# Patient Record
Sex: FEMALE | Race: WHITE | Employment: OTHER | URBAN - METROPOLITAN AREA
[De-identification: names, ages, dates, MRNs, and addresses within clinical notes are randomized per-mention and may not be internally consistent; named-entity substitution may affect disease eponyms.]

---

## 2017-01-16 ENCOUNTER — APPOINTMENT (OUTPATIENT)
Dept: INTERNAL MEDICINE CLINIC | Age: 82
End: 2017-01-16

## 2017-01-16 ENCOUNTER — HOSPITAL ENCOUNTER (OUTPATIENT)
Dept: LAB | Age: 82
Discharge: HOME | End: 2017-01-16
Payer: MEDICARE

## 2017-01-16 DIAGNOSIS — E11.9 CONTROLLED TYPE 2 DIABETES MELLITUS WITHOUT COMPLICATION, UNSPECIFIED LONG TERM INSULIN USE STATUS: Primary | ICD-10-CM

## 2017-01-16 PROCEDURE — 85025 COMPLETE CBC W/AUTO DIFF WBC: CPT

## 2017-01-16 PROCEDURE — 80053 COMPREHEN METABOLIC PANEL: CPT

## 2017-01-16 PROCEDURE — 36415 COLL VENOUS BLD VENIPUNCTURE: CPT

## 2017-01-16 PROCEDURE — 80061 LIPID PANEL: CPT

## 2017-01-16 PROCEDURE — 83036 HEMOGLOBIN GLYCOSYLATED A1C: CPT

## 2017-01-17 ENCOUNTER — OFFICE VISIT (OUTPATIENT)
Dept: INTERNAL MEDICINE CLINIC | Age: 82
End: 2017-01-17

## 2017-01-17 VITALS
WEIGHT: 187 LBS | OXYGEN SATURATION: 97 % | BODY MASS INDEX: 31.16 KG/M2 | SYSTOLIC BLOOD PRESSURE: 131 MMHG | DIASTOLIC BLOOD PRESSURE: 75 MMHG | HEART RATE: 84 BPM | RESPIRATION RATE: 18 BRPM | HEIGHT: 65 IN | TEMPERATURE: 98.1 F

## 2017-01-17 DIAGNOSIS — R73.09 ELEVATED HEMOGLOBIN A1C: ICD-10-CM

## 2017-01-17 DIAGNOSIS — Z23 ENCOUNTER FOR IMMUNIZATION: ICD-10-CM

## 2017-01-17 DIAGNOSIS — I10 ESSENTIAL HYPERTENSION: Primary | ICD-10-CM

## 2017-01-17 DIAGNOSIS — K21.9 GASTROESOPHAGEAL REFLUX DISEASE WITHOUT ESOPHAGITIS: ICD-10-CM

## 2017-01-17 LAB
ALBUMIN SERPL-MCNC: 4.5 G/DL (ref 3.5–4.7)
ALBUMIN/GLOB SERPL: 2.1 {RATIO} (ref 1.1–2.5)
ALP SERPL-CCNC: 89 IU/L (ref 39–117)
ALT SERPL-CCNC: 21 IU/L (ref 0–32)
AST SERPL-CCNC: 21 IU/L (ref 0–40)
BASOPHILS # BLD AUTO: 0 X10E3/UL (ref 0–0.2)
BASOPHILS NFR BLD AUTO: 1 %
BILIRUB SERPL-MCNC: 0.5 MG/DL (ref 0–1.2)
BUN SERPL-MCNC: 24 MG/DL (ref 8–27)
BUN/CREAT SERPL: 29 (ref 11–26)
CALCIUM SERPL-MCNC: 10 MG/DL (ref 8.7–10.3)
CHLORIDE SERPL-SCNC: 101 MMOL/L (ref 96–106)
CHOLEST SERPL-MCNC: 160 MG/DL (ref 100–199)
CO2 SERPL-SCNC: 25 MMOL/L (ref 18–29)
CREAT SERPL-MCNC: 0.83 MG/DL (ref 0.57–1)
EOSINOPHIL # BLD AUTO: 0.1 X10E3/UL (ref 0–0.4)
EOSINOPHIL NFR BLD AUTO: 2 %
ERYTHROCYTE [DISTWIDTH] IN BLOOD BY AUTOMATED COUNT: 13.7 % (ref 12.3–15.4)
EST. AVERAGE GLUCOSE BLD GHB EST-MCNC: 137 MG/DL
GLOBULIN SER CALC-MCNC: 2.1 G/DL (ref 1.5–4.5)
GLUCOSE SERPL-MCNC: 120 MG/DL (ref 65–99)
HBA1C MFR BLD: 6.4 % (ref 4.8–5.6)
HCT VFR BLD AUTO: 44.1 % (ref 34–46.6)
HDLC SERPL-MCNC: 42 MG/DL
HGB BLD-MCNC: 14.6 G/DL (ref 11.1–15.9)
IMM GRANULOCYTES # BLD: 0 X10E3/UL (ref 0–0.1)
IMM GRANULOCYTES NFR BLD: 0 %
LDLC SERPL CALC-MCNC: 88 MG/DL (ref 0–99)
LYMPHOCYTES # BLD AUTO: 2.7 X10E3/UL (ref 0.7–3.1)
LYMPHOCYTES NFR BLD AUTO: 45 %
MCH RBC QN AUTO: 30.2 PG (ref 26.6–33)
MCHC RBC AUTO-ENTMCNC: 33.1 G/DL (ref 31.5–35.7)
MCV RBC AUTO: 91 FL (ref 79–97)
MONOCYTES # BLD AUTO: 0.5 X10E3/UL (ref 0.1–0.9)
MONOCYTES NFR BLD AUTO: 8 %
NEUTROPHILS # BLD AUTO: 2.6 X10E3/UL (ref 1.4–7)
NEUTROPHILS NFR BLD AUTO: 44 %
PLATELET # BLD AUTO: 203 X10E3/UL (ref 150–379)
POTASSIUM SERPL-SCNC: 5.2 MMOL/L (ref 3.5–5.2)
PROT SERPL-MCNC: 6.6 G/DL (ref 6–8.5)
RBC # BLD AUTO: 4.83 X10E6/UL (ref 3.77–5.28)
SODIUM SERPL-SCNC: 140 MMOL/L (ref 134–144)
TRIGL SERPL-MCNC: 148 MG/DL (ref 0–149)
VLDLC SERPL CALC-MCNC: 30 MG/DL (ref 5–40)
WBC # BLD AUTO: 5.9 X10E3/UL (ref 3.4–10.8)

## 2017-01-17 RX ORDER — DEXAMETHASONE 0.5 MG/5ML
SOLUTION ORAL
COMMUNITY
End: 2017-06-06 | Stop reason: CLARIF

## 2017-01-17 NOTE — MR AVS SNAPSHOT
Visit Information Date & Time Provider Department Dept. Phone Encounter #  
 1/17/2017  8:15 AM Ru Michaels, [address] [place] [address],[address] [phone] [id number] Follow-up Instructions Return in about 6 months (around 7/17/2017) for follow up htn , prediabetes and hld. Upcoming Health Maintenance Date Due DTaP/Tdap/Td series (1 - Tdap) 11/8/1957 OSTEOPOROSIS SCREENING (DEXA) 11/8/2001 Pneumococcal 65+ Low/Medium Risk (2 of 2 - PCV13) 11/10/2015 INFLUENZA AGE 9 TO ADULT 8/1/2016 MEDICARE YEARLY EXAM 12/16/2016 GLAUCOMA SCREENING Q2Y 10/6/2017 Allergies as of 1/17/2017  Review Complete On: 1/17/2017 By: Ru Michaels MD  
  
 Severity Noted Reaction Type Reactions Fosamax [Alendronate]  04/01/2011    Nausea Only Current Immunizations  Never Reviewed Name Date Influenza Vaccine 11/10/2014, 11/1/2013 Pneumococcal Polysaccharide (PPSV-23) 11/10/2014 Zoster 1/1/2012 Not reviewed this visit You Were Diagnosed With   
  
 Codes Comments Essential hypertension    -  Primary ICD-10-CM: I10 
ICD-9-CM: 401.9 Gastroesophageal reflux disease without esophagitis     ICD-10-CM: K21.9 ICD-9-CM: 530.81 Elevated hemoglobin A1c     ICD-10-CM: R73.09 
ICD-9-CM: 790.29 Vitals BP Pulse Temp Resp Height(growth percentile) Weight(growth percentile) 131/75 (BP 1 Location: Right arm, BP Patient Position: Sitting) 84 98.1 °F (36.7 °C) (Oral) 18 5' 5\" (1.651 m) 187 lb (84.8 kg) SpO2 BMI OB Status Smoking Status 97% 31.12 kg/m2 Postmenopausal Former Smoker Vitals History BMI and BSA Data Body Mass Index Body Surface Area  
 31.12 kg/m 2 1.97 m 2 Preferred Pharmacy Pharmacy Name Phone [facility] [phone] Your Updated Medication List  
  
   
This list is accurate as of: 1/17/17  9:05 AM.  Always use your most recent med list.  
  
 acetaminophen 650 mg CR tablet Commonly known as:  TYLENOL Take 650 mg by mouth every six (6) hours as needed for Pain. ADVIL 200 mg tablet Generic drug:  ibuprofen Take 200 mg by mouth. ASPIR-LOW 81 mg tablet Generic drug:  aspirin delayed-release Take 81 mg by mouth daily. Indications: CEREBRAL THROMBOEMBOLISM PREVENTION, PREVENTION OF TRANSIENT ISCHEMIC ATTACKS  
  
 dexamethasone 0.5 mg/5 mL solution Commonly known as:  DECADRON Take  by mouth. Rinse with 1 teaspoon four times a day  
  
 losartan 50 mg tablet Commonly known as:  COZAAR Take 1 Tab by mouth daily. NexIUM 40 mg capsule Generic drug:  esomeprazole TAKE 1 CAPSULE DAILY  
  
 simvastatin 20 mg tablet Commonly known as:  ZOCOR  
TAKE 1 TABLET NIGHTLY  
  
 sodium chloride 2 % ophthalmic solution Commonly known as:  EMIR 128 Administer 1 Drop to both eyes as needed. timolol maleate 0.5 % Drpd ophthalmic solution Administer 1 Drop to both eyes daily. traMADol-acetaminophen 37.5-325 mg per tablet Commonly known as:  ULTRACET Take 1 Tab by mouth every eight (8) hours as needed for Pain. Max Daily Amount: 3 Tabs. Indications: PAIN We Performed the Following HEMOGLOBIN A1C WITH EAG [72854 CPT(R)] Follow-up Instructions Return in about 6 months (around 7/17/2017) for follow up htn , prediabetes and hld. Introducing [facility] & [facility]! Dear Luisana Gill: Thank you for requesting a [employer] account. Our records indicate that you already have an active [employer] account. You can access your account anytime at [email or web address] Did you know that you can access your hospital and ER discharge instructions at any time in [employer]? You can also review all of your test results from your hospital stay or ER visit. Additional Information If you have questions, please visit the Frequently Asked Questions section of the [employer] website at [email or web address] Remember, [employer] is NOT to be used for urgent needs. For medical emergencies, dial 911. Now available from your iPhone and Android! Please provide this summary of care documentation to your next provider. Your primary care clinician is listed as aVl Levin. If you have any questions after today's visit, please call [phone].

## 2017-01-17 NOTE — PROGRESS NOTES
SUBJECTIVE:   Ms. Herber Castro is a [de-identified] y.o. female who is here for follow up of htn and prediabetes. Pt reports the pain with shingles was relentless, but the pain medication did offer some relief. Pt's BP is well controlled at 131/75 today. Pt's labs from yesterday were reviewed noting Hgb A1C of 6.4, glucose of 120, normal triglycerides, normal cholesterol, and HDL of 42. . Pt's Hgb A1C was 6.5 on 7/27/16. Pt reports her Hgb A1C was 6.4 years ago as well. Pt states she has always eaten vegetables, but she has started to cut back on simple carbohydrates. Pt reports eating salmon three times a week and steak once a week. Pt endorses exercising for 5 minutes daily and walking up 3 flights of stairs. Pt states her dentist told her she had inflammation in two areas of her gum. Pt reports being prescribed Dexamethasone 0.5/5ml rinse QID. Pt claims she has been using this for 6 days so far. Pt denies knowing when she should discontinue the medication. Pt recalls her GI doctor telling her to mix up Nexium with OTC alternatives. Pt claims she can control her GERD with lifestyle and diet changes. Pt denies taking a multivitamin. Pt endorses having a Dexa bone density lab previously and was dx with the beginning of osteopenia. Pt claims Fosamax caused stomach distress. Pt notes she is not interested in having another study at this time. Pt endorses taking Calcium supplements BID. ROUTINE HEALTH MAINTENANCE:  PREVENTIVE:  Flu: given today    At this time, she is otherwise doing well and has brought no other complaints to my attention today. For a list of the medical issues addressed today, see the assessment and plan below. PMH:   Past Medical History   Diagnosis Date    Adverse effect of anesthesia 2005     took a long time to go to sleep with last colonoscopy    CAD (coronary artery disease) 2010     MI?     Gastrointestinal disorder      GERD    Glaucoma     Hypertension     Ill-defined condition      osteopenia    Ill-defined condition      pulled shoulder lt. and elbow replacement     PSH:  has a past surgical history that includes colonoscopy; orthopaedic; colonoscopy,diagnostic (9/9/2016); and colonoscopy (N/A, 9/9/2016). All: is allergic to fosamax [alendronate]. MEDS:   Current Outpatient Prescriptions   Medication Sig    dexamethasone (DECADRON) 0.5 mg/5 mL solution Take  by mouth. Rinse with 1 teaspoon four times a day    NEXIUM 40 mg capsule TAKE 1 CAPSULE DAILY    ibuprofen (ADVIL) 200 mg tablet Take 200 mg by mouth.  losartan (COZAAR) 50 mg tablet Take 1 Tab by mouth daily.  simvastatin (ZOCOR) 20 mg tablet TAKE 1 TABLET NIGHTLY    sodium chloride (EMIR 128) 2 % ophthalmic solution Administer 1 Drop to both eyes as needed.  timolol maleate 0.5 % DrpD ophthalmic solution Administer 1 Drop to both eyes daily.  aspirin delayed-release (ASPIR-LOW) 81 mg tablet Take 81 mg by mouth daily. Indications: CEREBRAL THROMBOEMBOLISM PREVENTION, PREVENTION OF TRANSIENT ISCHEMIC ATTACKS    traMADol-acetaminophen (ULTRACET) 37.5-325 mg per tablet Take 1 Tab by mouth every eight (8) hours as needed for Pain. Max Daily Amount: 3 Tabs. Indications: PAIN    acetaminophen (TYLENOL) 650 mg CR tablet Take 650 mg by mouth every six (6) hours as needed for Pain. No current facility-administered medications for this visit. FH: family history includes Cancer in her father. SH:  reports that she quit smoking about 58 years ago. She has never used smokeless tobacco. She reports that she drinks about 0.5 oz of alcohol per week  She reports that she does not use illicit drugs.      Review of Systems - History obtained from the patient  General ROS: negative  Psychological ROS: negative  Ophthalmic ROS: negative  ENT ROS: negative  Respiratory ROS: no cough, shortness of breath, or wheezing  Cardiovascular ROS: no chest pain or dyspnea on exertion  Gastrointestinal ROS: no abdominal pain, change in bowel habits, or black or bloody stools  Genito-Urinary ROS: negative  Musculoskeletal ROS: negative  Neurological ROS: negative  Dermatological ROS: negative    OBJECTIVE:   Vitals:   Visit Vitals    /75 (BP 1 Location: Right arm, BP Patient Position: Sitting)    Pulse 84    Temp 98.1 °F (36.7 °C) (Oral)    Resp 18    Ht 5' 5\" (1.651 m)    Wt 187 lb (84.8 kg)    SpO2 97%    BMI 31.12 kg/m2      Gen: Pleasant [de-identified] y.o.  female in NAD. HEENT: NC/AT. HEART: RRR, No M/G/R. LUNGS: CTAB No W/R. EXTREMITIES: Warm. No C/C/E. NEURO: Alert and oriented x 3. Cranial nerves grossly intact. No focal sensory or motor deficits noted. SKIN: Warm. Dry. No rashes or other lesions noted. ASSESSMENT/ PLAN:     Leila Bobo was seen today for hypertension, follow-up and other. Diagnoses and all orders for this visit:    Essential hypertension    Gastroesophageal reflux disease without esophagitis    Elevated hemoglobin A1c  -     HEMOGLOBIN A1C WITH EAG       Pt was given Fluzone in the office today. Pt was advised to hold Dexamethasone for one week after receiving flu vaccine. I also suggested the pt talk to her dentist about how long she should be using this steroid due to prediabetes. Htn is well controlled with current medication. Pt will continue with Cozaar. I recommended the pt substitute pasta and rice for spiral vegetables and cauliflower rice. I advised patient to exercise 3x/week with recumbent bike and walking. Pt will have a f/u visit  in 6 months for htn, prediabetes, and hld, and 3 months for Hgb A1C lab visit. Follow-up Disposition:  Return in about 6 months (around 7/17/2017) for follow up htn , prediabetes and hld. I have reviewed the patient's medications and risks/side effects/benefits were discussed. Diagnosis(-es) explained to patient and questions answered. Literature provided where appropriate.      Written by jose m Isabel dictated by Naomi Dueñas MD.

## 2017-01-17 NOTE — PROGRESS NOTES
1. Have you been to the ER, urgent care clinic since your last visit? Hospitalized since your last visit?no    2. Have you seen or consulted any other health care providers outside of the [address] [place] [address] since your last visit? Include any pap smears or colon screening.  no

## 2017-01-17 NOTE — PROGRESS NOTES
1. Have you been to the ER, urgent care clinic since your last visit? Hospitalized since your last visit?no    2. Have you seen or consulted any other health care providers outside of the [address] [place] [address] since your last visit? Include any pap smears or colon screening.  no

## 2017-05-02 ENCOUNTER — OFFICE VISIT (OUTPATIENT)
Dept: INTERNAL MEDICINE CLINIC | Age: 82
End: 2017-05-02

## 2017-05-02 VITALS
SYSTOLIC BLOOD PRESSURE: 153 MMHG | BODY MASS INDEX: 31.42 KG/M2 | WEIGHT: 188.6 LBS | RESPIRATION RATE: 18 BRPM | HEIGHT: 65 IN | DIASTOLIC BLOOD PRESSURE: 77 MMHG | TEMPERATURE: 98.1 F | HEART RATE: 75 BPM | OXYGEN SATURATION: 96 %

## 2017-05-02 DIAGNOSIS — M54.31 SCIATICA OF RIGHT SIDE: Primary | ICD-10-CM

## 2017-05-02 RX ORDER — HYDROCODONE BITARTRATE AND ACETAMINOPHEN 5; 325 MG/1; MG/1
1 TABLET ORAL
Qty: 21 TAB | Refills: 0 | Status: SHIPPED | OUTPATIENT
Start: 2017-05-02 | End: 2017-06-05 | Stop reason: ALTCHOICE

## 2017-05-02 NOTE — PROGRESS NOTES
SUBJECTIVE:   Ms. Valarie Wang is a [de-identified] y.o. female who is here for follow up of ED visit due to sciatica. Pt claims she was dx with right sciatica in [facility] ED on Friday by Dr. Tom Urias. Pt claims she had pain shooting down right leg upon waking on Friday. Pt reports being prescribed Ibuprofen, Medrol dosepack, and Norco. Pt endorses having an XR. Pt states it feels better to stand up. Pt claims she cannot get comfortable at night. Pt denies feeling effects of medicine except for Prednisone. Pt claims there is a way that she sits that she can get comfortable. Pt states she was given an appointment with Dr. Sherren Ellison (orthopedics) on Thursday. Pt notes she twisted her back to catch from falling a few days before sciatica onset. Pt claims the pain is worse with lying in bed and getting up in the morning. Pt states she likes to use a cane for about 15 minutes after waking due to morning stiffness and pain. Pt reports soreness in right leg with movement. Pt reports exercising regularly. Pt claims she felt plantar fasciitis pain that she had experienced about 10 years before. Pt denies having more pain due to resting her foot. At this time, she is otherwise doing well and has brought no other complaints to my attention today. For a list of the medical issues addressed today, see the assessment and plan below. PMH:   Past Medical History:   Diagnosis Date    Adverse effect of anesthesia 2005    took a long time to go to sleep with last colonoscopy    CAD (coronary artery disease) 2010    MI?  Gastrointestinal disorder     GERD    Glaucoma     Hypertension     Ill-defined condition     osteopenia    Ill-defined condition     pulled shoulder lt. and elbow replacement     PSH:  has a past surgical history that includes colonoscopy; orthopaedic; colonoscopy,diagnostic (9/9/2016); and colonoscopy (N/A, 9/9/2016). All: is allergic to fosamax [alendronate].    MEDS:   Current Outpatient Prescriptions Medication Sig    HYDROcodone-acetaminophen (NORCO) 5-325 mg per tablet Take 1 Tab by mouth every eight (8) hours as needed for Pain. Max Daily Amount: 3 Tabs.  dexamethasone (DECADRON) 0.5 mg/5 mL solution Take  by mouth. Rinse with 1 teaspoon four times a day    NEXIUM 40 mg capsule TAKE 1 CAPSULE DAILY    ibuprofen (ADVIL) 200 mg tablet Take 200 mg by mouth.  losartan (COZAAR) 50 mg tablet Take 1 Tab by mouth daily.  simvastatin (ZOCOR) 20 mg tablet TAKE 1 TABLET NIGHTLY    sodium chloride (EMIR 128) 2 % ophthalmic solution Administer 1 Drop to both eyes as needed.  timolol maleate 0.5 % DrpD ophthalmic solution Administer 1 Drop to both eyes daily.  aspirin delayed-release (ASPIR-LOW) 81 mg tablet Take 81 mg by mouth daily. Indications: CEREBRAL THROMBOEMBOLISM PREVENTION, PREVENTION OF TRANSIENT ISCHEMIC ATTACKS    traMADol-acetaminophen (ULTRACET) 37.5-325 mg per tablet Take 1 Tab by mouth every eight (8) hours as needed for Pain. Max Daily Amount: 3 Tabs. Indications: PAIN    acetaminophen (TYLENOL) 650 mg CR tablet Take 650 mg by mouth every six (6) hours as needed for Pain. No current facility-administered medications for this visit. FH: family history includes Cancer in her father. SH:  reports that she quit smoking about 58 years ago. She has never used smokeless tobacco. She reports that she drinks about 0.5 oz of alcohol per week  She reports that she does not use illicit drugs.      Review of Systems - History obtained from the patient  General ROS: negative  Psychological ROS: negative  Ophthalmic ROS: negative  ENT ROS: negative  Respiratory ROS: no cough, shortness of breath, or wheezing  Cardiovascular ROS: no chest pain or dyspnea on exertion  Gastrointestinal ROS: no abdominal pain, change in bowel habits, or black or bloody stools  Genito-Urinary ROS: negative  Musculoskeletal ROS: negative  Neurological ROS: +right sciatica pain, otherwise negative  Dermatological ROS: negative    OBJECTIVE:   Vitals:   Visit Vitals    /77    Pulse 75    Temp 98.1 °F (36.7 °C) (Oral)    Resp 18    Ht 5' 5\" (1.651 m)    Wt 188 lb 9.6 oz (85.5 kg)    SpO2 96%    BMI 31.38 kg/m2      Gen: Pleasant [de-identified] y.o.  female in NAD. HEENT: NC/AT. HEART: RRR, No M/G/R. LUNGS: CTAB No W/R. EXTREMITIES: Warm. No C/C/E. MUSCULOSKELETAL: Normal ROM, muscle strength 5/5 all groups. NEURO: Alert and oriented x 3. Cranial nerves grossly intact. No focal sensory or motor deficits noted. SKIN: Warm. Dry. No rashes or other lesions noted. ASSESSMENT/ PLAN:   Erik Priest was seen today for hospital follow up. Diagnoses and all orders for this visit:    Sciatica of right side  -     HYDROcodone-acetaminophen (NORCO) 5-325 mg per tablet; Take 1 Tab by mouth every eight (8) hours as needed for Pain. Max Daily Amount: 3 Tabs. Pt was advised to finish steroid course and not restart steroids until seeing Dr. Shani Johnson (orthopedics). I recommended the pt not take medication before appointment Thursday with Dr. Shani Johnson. I refilled Norco 5-325mg every 8 hours prn for pain. Pt may space Norco doses out, and does not need to take it if she is not in pain. Pt will f/u if symptoms worsen or fail to improve. Follow-up Disposition:  Return if symptoms worsen or fail to improve. I have reviewed the patient's medications and risks/side effects/benefits were discussed. Diagnosis(-es) explained to patient and questions answered. Literature provided where appropriate.      Written by Angelina Marinelli, as dictated by Joan Snider MD.

## 2017-05-02 NOTE — PROGRESS NOTES
Reviewed record in preparation for visit and have obtained necessary documentation. Identified pt with two pt identifiers(name and ). Chief Complaint   Patient presents with   [facility] Follow Up     pt is here for a f/u on sciatica in right leg pain is now in hip radiating down her leg       Health Maintenance Due   Topic Date Due    DTaP/Tdap/Td series (1 - Tdap) 1957    OSTEOPOROSIS SCREENING (DEXA)  2001    Pneumococcal 65+ Low/Medium Risk (2 of 2 - PCV13) 11/10/2015    MEDICARE YEARLY EXAM  2016       Coordination of Care Questionnaire:  :     1. Have you been to the ER, urgent care clinic since your last visit? Hospitalized since your last visit? No    2. Have you seen or consulted any other health care providers outside of the [address] [place] [address] since your last visit? Include any pap smears or colon screening.  No

## 2017-05-02 NOTE — MR AVS SNAPSHOT
Visit Information Date & Time Provider Department Dept. Phone Encounter #  
 5/2/2017 10:45 AM Othel Crigler, [address] [place] [address],[address] [phone] [id number] Follow-up Instructions Return if symptoms worsen or fail to improve. Upcoming Health Maintenance Date Due DTaP/Tdap/Td series (1 - Tdap) 11/8/1957 OSTEOPOROSIS SCREENING (DEXA) 11/8/2001 Pneumococcal 65+ Low/Medium Risk (2 of 2 - PCV13) 11/10/2015 MEDICARE YEARLY EXAM 12/16/2016 INFLUENZA AGE 9 TO ADULT 8/1/2017 GLAUCOMA SCREENING Q2Y 10/6/2017 Allergies as of 5/2/2017  Review Complete On: 5/2/2017 By: Gretta Willingham Severity Noted Reaction Type Reactions Fosamax [Alendronate]  04/01/2011    Nausea Only Current Immunizations  Never Reviewed Name Date Influenza Vaccine 11/10/2014, 11/1/2013 Influenza Vaccine (Quad) PF 1/17/2017 Pneumococcal Polysaccharide (PPSV-23) 11/10/2014 Zoster 1/1/2012 Not reviewed this visit You Were Diagnosed With   
  
 Codes Comments Sciatica of right side    -  Primary ICD-10-CM: M54.31 
ICD-9-CM: 724.3 Vitals BP Pulse Temp Resp Height(growth percentile) Weight(growth percentile) 153/77 75 98.1 °F (36.7 °C) (Oral) 18 5' 5\" (1.651 m) 188 lb 9.6 oz (85.5 kg) SpO2 BMI OB Status Smoking Status 96% 31.38 kg/m2 Postmenopausal Former Smoker BMI and BSA Data Body Mass Index Body Surface Area  
 31.38 kg/m 2 1.98 m 2 Preferred Pharmacy Pharmacy Name Phone [facility] [address] [place], [address] [place] [address]. [phone] Your Updated Medication List  
  
   
This list is accurate as of: 5/2/17 12:26 PM.  Always use your most recent med list.  
  
  
  
  
 acetaminophen 650 mg CR tablet Commonly known as:  TYLENOL Take 650 mg by mouth every six (6) hours as needed for Pain. ADVIL 200 mg tablet Generic drug:  ibuprofen Take 200 mg by mouth. ASPIR-LOW 81 mg tablet Generic drug:  aspirin delayed-release Take 81 mg by mouth daily. Indications: CEREBRAL THROMBOEMBOLISM PREVENTION, PREVENTION OF TRANSIENT ISCHEMIC ATTACKS  
  
 dexamethasone 0.5 mg/5 mL solution Commonly known as:  DECADRON Take  by mouth. Rinse with 1 teaspoon four times a day HYDROcodone-acetaminophen 5-325 mg per tablet Commonly known as:  Jarome Chloe Take 1 Tab by mouth every eight (8) hours as needed for Pain. Max Daily Amount: 3 Tabs. losartan 50 mg tablet Commonly known as:  COZAAR Take 1 Tab by mouth daily. NexIUM 40 mg capsule Generic drug:  esomeprazole TAKE 1 CAPSULE DAILY  
  
 simvastatin 20 mg tablet Commonly known as:  ZOCOR  
TAKE 1 TABLET NIGHTLY  
  
 sodium chloride 2 % ophthalmic solution Commonly known as:  EMIR 128 Administer 1 Drop to both eyes as needed. timolol maleate 0.5 % Drpd ophthalmic solution Administer 1 Drop to both eyes daily. traMADol-acetaminophen 37.5-325 mg per tablet Commonly known as:  ULTRACET Take 1 Tab by mouth every eight (8) hours as needed for Pain. Max Daily Amount: 3 Tabs. Indications: PAIN Prescriptions Printed Refills HYDROcodone-acetaminophen (NORCO) 5-325 mg per tablet 0 Sig: Take 1 Tab by mouth every eight (8) hours as needed for Pain. Max Daily Amount: 3 Tabs. Class: Print Route: Oral  
  
Follow-up Instructions Return if symptoms worsen or fail to improve. \[facility]\"" & HEALTH SERVICES! Dear Howie Cast: Thank you for requesting a [email or web address] account. Our records indicate that you already have an active [email or web address] account. You can access your account anytime at [email or web address] Did you know that you can access your hospital and ER discharge instructions at any time in [email or web address]? You can also review all of your test results from your hospital stay or ER visit. Additional Information If you have questions, please visit the Frequently Asked Questions section of the [email or web address] website at [email or web address] Remember, [employer] is NOT to be used for urgent needs. For medical emergencies, dial 911. Now available from your iPhone and Android! Please provide this summary of care documentation to your next provider. Your primary care clinician is listed as Taty Ferrell. If you have any questions after today's visit, please call [phone].

## 2017-05-12 ENCOUNTER — HOSPITAL ENCOUNTER (OUTPATIENT)
Dept: MRI IMAGING | Age: 82
Discharge: HOME | End: 2017-05-12
Attending: ORTHOPAEDIC SURGERY
Payer: MEDICARE

## 2017-05-12 DIAGNOSIS — M47.817 LUMBOSACRAL SPONDYLOSIS WITHOUT MYELOPATHY: ICD-10-CM

## 2017-05-12 DIAGNOSIS — M54.31 RIGHT SIDED SCIATICA: ICD-10-CM

## 2017-05-12 DIAGNOSIS — M54.50 LOW BACK PAIN: ICD-10-CM

## 2017-05-12 PROCEDURE — 72148 MRI LUMBAR SPINE W/O DYE: CPT

## 2017-05-24 ENCOUNTER — TELEPHONE (OUTPATIENT)
Dept: INTERNAL MEDICINE CLINIC | Age: 82
End: 2017-05-24

## 2017-05-24 NOTE — TELEPHONE ENCOUNTER
Patient states she needs a call back in reference to getting Dr. Hadley Vargas opinion on getting another Epidural Steroid injection for her back pain (as patient reports she has history of Diabetes) until her upcoming surgery on 6/13/17 for herniated disc.  Patient also needs to get a Surgical Clearance appt scheduled for her surgery date of 6/13/17 as there are no appts available at this time with Dr. Scott Fox. Please call to discuss & advise on both of her questions/concerns/ Thank you

## 2017-05-25 ENCOUNTER — TELEPHONE (OUTPATIENT)
Dept: INTERNAL MEDICINE CLINIC | Age: 82
End: 2017-05-25

## 2017-05-25 NOTE — TELEPHONE ENCOUNTER
Patient states she needs a call back to get Surgical Clearance appt set. No appts available in patients time frame for surgery date. Please call.  Thank you

## 2017-05-25 NOTE — TELEPHONE ENCOUNTER
Pt is requesting a call back from the doctor. Best contact number [phone].        Message received & copied from [facility]

## 2017-05-25 NOTE — TELEPHONE ENCOUNTER
Identified patient 2 identifiers verified. Patient is scheduled for an pre-op appointment 6/5/17 at 12 noon with Dr. Lyndsey Young. Patient will also contact Dr. Jemima Madison about epidural injection and was advised that Dr. Lyndsey Young does not give injections.

## 2017-06-05 ENCOUNTER — OFFICE VISIT (OUTPATIENT)
Dept: INTERNAL MEDICINE CLINIC | Age: 82
End: 2017-06-05

## 2017-06-05 VITALS
HEART RATE: 93 BPM | WEIGHT: 190 LBS | TEMPERATURE: 98.2 F | BODY MASS INDEX: 31.65 KG/M2 | SYSTOLIC BLOOD PRESSURE: 147 MMHG | RESPIRATION RATE: 18 BRPM | HEIGHT: 65 IN | OXYGEN SATURATION: 96 % | DIASTOLIC BLOOD PRESSURE: 77 MMHG

## 2017-06-05 DIAGNOSIS — Z01.818 PREOP EXAMINATION: ICD-10-CM

## 2017-06-05 DIAGNOSIS — Z00.00 ROUTINE GENERAL MEDICAL EXAMINATION AT A HEALTH CARE FACILITY: Primary | ICD-10-CM

## 2017-06-05 DIAGNOSIS — Z13.39 SCREENING FOR ALCOHOLISM: ICD-10-CM

## 2017-06-05 DIAGNOSIS — Z13.31 SCREENING FOR DEPRESSION: ICD-10-CM

## 2017-06-05 RX ORDER — GABAPENTIN 800 MG/1
300 TABLET ORAL 3 TIMES DAILY
COMMUNITY
End: 2017-09-15 | Stop reason: ALTCHOICE

## 2017-06-05 NOTE — PROGRESS NOTES
SUBJECTIVE:   Tuyet Belle is a [de-identified] y.o. female who is here for a pre operative exam.     Surgery: L4-5 Discectomy   Date of Surgery: 6/13/2017  Surgeon: Dr. Sherren Ellison    Pt reports the pain associated with her back is worse than labor or any other surgeries she has had. She notes the pain radiates to foot and ankle. Pt reports Gabapentin was increased to 300mg TID. Pt claims Ibuprofen was discontinued for upcoming surgery. Pt states she was given Tramadol PRN for pain management instead and was told she could take Tylenol with this medication. Pt reports she will be measured for a brace to wear while recovering from surgery. Pt brought letter from her insurance with her today stating they will no longer be covering Nexium. Pt denies taking Nexium often because she has been controlling symptoms of GERD with diet. Pt reports diet has been healthy as people have been bringing her meals. Pt reports recent swelling of left ankle. Pt claims she has been favoring her right ankle due to pain, leading to increased strain on left. Pt does report having salty chips lately. Pt reports edema is resolved with elevation. Pt reports her BP was elevated at 199/101 at Dr. Tye Castro office this am. Pt's BP is slightly elevated at 147/77 and 140/80 upon recheck today. Pt reports checking her BP at home with slightly high readings that she attributes to being in constant pain. Pt specifically denies stomach upset and changes in BMs. At this time, she is otherwise doing well and has brought no other complaints to my attention today. For a list of the medical issues addressed today, see the assessment and plan below. PMH:   Past Medical History:   Diagnosis Date    Adverse effect of anesthesia 2005    took a long time to go to sleep with last colonoscopy    CAD (coronary artery disease) 2010    MI?     Gastrointestinal disorder     GERD    Glaucoma     Hypertension     Ill-defined condition     osteopenia    Ill-defined condition     pulled shoulder lt. and elbow replacement       PSH:  has a past surgical history that includes colonoscopy; orthopaedic; colonoscopy,diagnostic (9/9/2016); and colonoscopy (N/A, 9/9/2016). Allergies: is allergic to fosamax [alendronate]. Meds:   Current Outpatient Prescriptions   Medication Sig    gabapentin (NEURONTIN) 800 mg tablet Take 300 mg by mouth three (3) times daily.  NEXIUM 40 mg capsule TAKE 1 CAPSULE DAILY    dexamethasone (DECADRON) 0.5 mg/5 mL solution Take  by mouth. Rinse with 1 teaspoon four times a day    ibuprofen (ADVIL) 200 mg tablet Take 200 mg by mouth.  losartan (COZAAR) 50 mg tablet Take 1 Tab by mouth daily.  simvastatin (ZOCOR) 20 mg tablet TAKE 1 TABLET NIGHTLY    sodium chloride (EMIR 128) 2 % ophthalmic solution Administer 1 Drop to both eyes as needed.  timolol maleate 0.5 % DrpD ophthalmic solution Administer 1 Drop to both eyes daily.  aspirin delayed-release (ASPIR-LOW) 81 mg tablet Take 81 mg by mouth daily. Indications: CEREBRAL THROMBOEMBOLISM PREVENTION, PREVENTION OF TRANSIENT ISCHEMIC ATTACKS     No current facility-administered medications for this visit. Fam hx: family history includes Cancer in her father. Soc hx:  reports that she quit smoking about 58 years ago. She has never used smokeless tobacco. She reports that she drinks about 0.5 oz of alcohol per week  She reports that she does not use illicit drugs.       Review of Systems - History obtained from the patient  General ROS: negative  Psychological ROS: negative  Ophthalmic ROS: negative  ENT ROS: negative  Respiratory ROS: no cough, shortness of breath, or wheezing  Cardiovascular ROS: no chest pain or dyspnea on exertion  Gastrointestinal ROS: no abdominal pain, change in bowel habits, or black or bloody stools  Genito-Urinary ROS: negative  Musculoskeletal ROS: negative  Neurological ROS: negative  Dermatological ROS: negative    OBJECTIVE:   Vitals: Visit Vitals    /77    Pulse 93    Temp 98.2 °F (36.8 °C) (Oral)    Resp 18    Ht 5' 5\" (1.651 m)    Wt 190 lb (86.2 kg)    SpO2 96%    BMI 31.62 kg/m2     Gen: Pleasant [de-identified] y.o. female in NAD. HEENT: PERRLA. EOMI. OP moist and pink. EARS: TMs normal and canals equal bilaterally. NECK: Supple. No LAD. No thyromegaly. HEART: RRR, No M/G/R.   LUNGS: CTAB No W/R. ABDOMEN: S, NT, ND, BS+. EXTREMITIES: Warm. No C/C/E.  MUSCULOSKELETAL: Normal ROM, muscle strength 5/5 all groups. NEURO: Alert and oriented x 3. Cranial nerves grossly intact. No focal sensory or motor deficits noted. SKIN: Warm. Dry. No rashes or other lesions noted. Left ankle: trace edema, normal pulses, non pitting. ASSESSMENT/ PLAN:     Rabia Alfonso was seen today for pre-op exam.    Diagnoses and all orders for this visit:    Preop examination    Routine general medical examination at a health care facility    Screening for alcoholism    Screening for depression  -     Depression Screen Annual      I will change pt's GERD medication from Nexium to Prilosec due to changes in insurance coverage. She may continue holding Nexium or other GERD medications if she is not having symptoms. Elier De Leon's pre operative exam was normal. I cleared the pt for surgery scheduled for 6/13/17. I will fax over the appropriate paperwork to the surgeon. The pt will follow up PRN. Follow-up Disposition: Not on File    I have reviewed the patient's medications and risks/side effects/benefits were discussed. Diagnosis(-es) explained to patient and questions answered. Literature provided where appropriate.      Written by Merrily Buerger, as dictated by Bianca Perez MD.

## 2017-06-05 NOTE — MR AVS SNAPSHOT
Visit Information Date & Time Provider Department Dept. Phone Encounter #  
 6/5/2017 12:00 PM Santhosh Lu, [address] [id number] Upcoming Health Maintenance Date Due DTaP/Tdap/Td series (1 - Tdap) 11/8/1956 OSTEOPOROSIS SCREENING (DEXA) 11/8/2001 Pneumococcal 65+ Low/Medium Risk (2 of 2 - PCV13) 11/10/2015 INFLUENZA AGE 9 TO ADULT 8/1/2017 GLAUCOMA SCREENING Q2Y 10/6/2017 MEDICARE YEARLY EXAM 6/6/2018 Allergies as of 6/5/2017  Review Complete On: 6/5/2017 By: Santhosh Lu MD  
  
 Severity Noted Reaction Type Reactions Fosamax [Alendronate]  04/01/2011    Nausea Only Current Immunizations  Never Reviewed Name Date Influenza Vaccine 11/10/2014, 11/1/2013 Influenza Vaccine (Quad) PF 1/17/2017 Pneumococcal Polysaccharide (PPSV-23) 11/10/2014 Zoster 1/1/2012 Not reviewed this visit You Were Diagnosed With   
  
 Codes Comments Preop examination    -  Primary ICD-10-CM: S04.561 ICD-9-CM: V72.84 Routine general medical examination at a health care facility     ICD-10-CM: Z00.00 ICD-9-CM: V70.0 Screening for alcoholism     ICD-10-CM: Z13.89 ICD-9-CM: V79.1 Screening for depression     ICD-10-CM: Z13.89 ICD-9-CM: V79.0 Vitals BP Pulse Temp Resp Height(growth percentile) Weight(growth percentile) 147/77 93 98.2 °F (36.8 °C) (Oral) 18 5' 5\" (1.651 m) 190 lb (86.2 kg) SpO2 BMI OB Status Smoking Status 96% 31.62 kg/m2 Postmenopausal Former Smoker BMI and BSA Data Body Mass Index Body Surface Area  
 31.62 kg/m 2 1.99 m 2 Preferred Pharmacy Pharmacy Name Phone [address] [phone] Your Updated Medication List  
  
   
This list is accurate as of: 6/5/17 12:40 PM.  Always use your most recent med list. ADVIL 200 mg tablet Generic drug:  ibuprofen Take 200 mg by mouth. ASPIR-LOW 81 mg tablet Generic drug:  aspirin delayed-release Take 81 mg by mouth daily. Indications: CEREBRAL THROMBOEMBOLISM PREVENTION, PREVENTION OF TRANSIENT ISCHEMIC ATTACKS  
  
 dexamethasone 0.5 mg/5 mL solution Commonly known as:  DECADRON Take  by mouth. Rinse with 1 teaspoon four times a day  
  
 gabapentin 800 mg tablet Commonly known as:  NEURONTIN Take 300 mg by mouth three (3) times daily. losartan 50 mg tablet Commonly known as:  COZAAR Take 1 Tab by mouth daily. NexIUM 40 mg capsule Generic drug:  esomeprazole TAKE 1 CAPSULE DAILY  
  
 simvastatin 20 mg tablet Commonly known as:  ZOCOR  
TAKE 1 TABLET NIGHTLY  
  
 sodium chloride 2 % ophthalmic solution Commonly known as:  EMIR 128 Administer 1 Drop to both eyes as needed. timolol maleate 0.5 % Drpd ophthalmic solution Administer 1 Drop to both eyes daily. We Performed the Following [facility] [[id number] [facility] [place]] To-Do List   
 06/06/2017 2:00 PM  
  Appointment with [facility] PAT ROOM P3 at [address] [place] [address] ([phone]) Patient Instructions Medicare Part B Preventive Services Limitations Recommendation/Date completed if known Scheduled/ Next Due Bone Mass Measurement 
(age 72 & older, biennial) Requires diagnosis related to osteoporosis or estrogen deficiency. Biennial benefit unless patient has history of long-term glucocorticoid tx or baseline is needed because initial test was by other method Completed: 
  
  
Recommended every 2 years DUE:   
Cardiovascular Screening Blood Tests (every 5 years) Total cholesterol, HDL, Triglycerides Order as a panel if possible Completed: 
4/30/15 
  
Recommended annually DUE: 
4/30/16 Colorectal Cancer Screening 
-Fecal occult blood test (annual) -Flexible sigmoidoscopy (5y) 
-Screening colonoscopy (10y) -Barium Enema   Completed: 
  
  
  
Recommended every 10 years DUE:  
 Counseling to Prevent Tobacco Use (up to 8 sessions per year) - Counseling greater than 3 and up to 10 minutes - Counseling greater than 10 minutes Patients must be asymptomatic of tobacco-related conditions to receive as preventive service      
Diabetes Screening Tests (at least every 3 years, Medicare covers annually or at 6-month intervals for prediabetic patients) 
  Fasting blood sugar (FBS) or glucose tolerance test (GTT) 
  
  Patient must be diagnosed with one of the following: 
-Hypertension, Dyslipidemia, obesity, previous impaired FBS or GTT 
Or any two of the following: overweight, FH of diabetes, age ? 72, history of gestational diabetes, birth of baby weighing more than 9 pounds Completed: 
  
9/17/15 
  
Recommended annually DUE: 
  
3/17/16 Diabetes Self-Management Training (DSMT) (no USPSTF recommendation) Requires referral by treating physician for patient with diabetes or renal disease. 10 hours of initial DSMT session of no less than 30 minutes each in a continuous 12-month period. 2 hours of follow-up DSMT in subsequent years.      
Glaucoma Screening (no USPSTF recommendation) Diabetes mellitus, family history, , age 48 or over,  American, age 72 or over Completed: 
  
  
  
Recommended annually DUE:  
Human Immunodeficiency Virus (HIV) Screening (annually for increased risk patients) HIV-1 and HIV-2 by EIA, SHANTI, rapid antibody test, or oral mucosa transudate Patient must be at increased risk for HIV infection per USPSTF guidelines or pregnant. Tests covered annually for patients at increased risk. Pregnant patients may receive up to 3 test during pregnancy.      
Medical Nutrition Therapy (MNT) (for diabetes or renal disease not recommended schedule) Requires referral by treating physician for patient with diabetes or renal disease.  Can be provided in same year as diabetes self-management training (DSMT), and CMS recommends medical nutrition therapy take place after DSMT. Up to 3 hours for initial year and 2 hours in subsequent years.      
Seasonal Influenza Vaccination (annually)   Completed: 
  
  Recommended annually 
  
DUE every Fall Pneumococcal Vaccination (once after 65)   Completed: 
11/20/2014    
Hepatitis B Vaccinations (if medium/high risk) Medium/high risk factors: End-stage renal disease, Hemophiliacs who received Factor VIII or IX concentrates, Clients of institutions for the mentally retarded, Persons who live in the same house as a HepB virus carrier, Homosexual men, Illicit injectable drug abusers.      
Screening Mammography (biennial age 54-69)? Annually (age 36 or over) Completed:  
6/11/2012 Recommended annually DUE: 
Per Dr. Fatou Daley  
Screening Pap Tests and Pelvic Examination (up to age 79 and after 79 if unknown history or abnormal study last 10 years) Every 24 months except high risk Completed: 
  
  
  
Recommended every 2 years DUE: 
  
No longer indicated Ultrasound Screening for Abdominal Aortic Aneurysm (AAA) (once) Patient must be referred through IPPE and not have had a screening for abdominal aortic aneurysm before under Medicare. Limited to patients who meet one of the following criteria: 
- Men who are 73-68 years old and have smoked more than 100 cigarettes in their lifetime. 
-Anyone with a FH of AAA 
-Anyone recommended for screening by USPSTF Not covered by Medicare as preventive.    
  
Thanks for coming in today. It was nice to spend some time with you. If you have any questions about your visit today, please call [phone] and ask for [facility]. Advance Directives: After Your Visit Your Care Instructions An advance directive is a legal way to state your wishes at the end of your life. It tells your family and your doctor what to do if you can no longer say what you want. There are two main types of advance directives. You can change them any time that your wishes change. · A living will tells your family and your doctor your wishes about life support and other treatment. · A medical power of  lets you name a person to make treatment decisions for you when you can't speak for yourself. This person is called a health care agent. If you do not have an advance directive, decisions about your medical care may be made by a doctor or a  who doesn't know you. It may help to think of an advance directive as a gift to the people who care for you. If you have one, they won't have to make tough decisions by themselves. Follow-up care is a key part of your treatment and safety. Be sure to make and go to all appointments, and call your doctor if you are having problems. It's also a good idea to know your test results and keep a list of the medicines you take. How can you care for yourself at home? · Discuss your wishes with your loved ones and your doctor. This way, there are no surprises. · Many states have a unique form. Or you might use a universal form that has been approved by many states. This kind of form can sometimes be completed and stored online. Your electronic copy will then be available wherever you have a connection to the Internet. In most cases, doctors will respect your wishes even if you have a form from a different state. · You don't need a  to do an advance directive. But you may want to get legal advice. · Think about these questions when you prepare an advance directive: ¨ Who do you want to make decisions about your medical care if you are not able to? Many people choose a family member, close friend, or doctor. ¨ Do you know enough about life support methods that might be used? If not, talk to your doctor so you understand. ¨ What are you most afraid of that might happen? You might be afraid of having pain, losing your independence, or being kept alive by machines. ¨ Where would you prefer to die?  Choices include your home, a hospital, or a nursing home. ¨ Would you like to have information about hospice care to support you and your family? ¨ Do you want to donate organs when you die? ¨ Do you want certain Gnosticism practices performed before you die? If so, put your wishes in the advance directive. · Read your advance directive every year, and make changes as needed. When should you call for help? Be sure to contact your doctor if you have any questions. Where can you learn more? Go to Can'tWait.be Enter R264 in the search box to learn more about \"Advance Directives: After Your Visit. \"  
© 8598-6172 [employer]. Care instructions adapted under license by Melisa Jung (which disclaims liability or warranty for this information). This care instruction is for use with your licensed healthcare professional. If you have questions about a medical condition or this instruction, always ask your healthcare professional. [address] any warranty or liability for your use of this information. Content Version: 73.2.036651; Current as of: February 20, 2015 Introducing [facility]! Dear Joann Figueroa: Thank you for requesting a [employer] account. Our records indicate that you already have an active [employer] account. You can access your account anytime at [email or web address]/"[employer] Did you know that you can access your hospital and ER discharge instructions at any time in [employer]? You can also review all of your test results from your hospital stay or ER visit. Additional Information If you have questions, please visit the Frequently Asked Questions section of the [employer] website at [email or web address]/"[employer]t/. Remember, [employer] is NOT to be used for urgent needs. For medical emergencies, dial 911. Now available from your iPhone and Android! Please provide this summary of care documentation to your next provider. Your primary care clinician is listed as Flower Zuniga. If you have any questions after today's visit, please call [phone].

## 2017-06-05 NOTE — PATIENT INSTRUCTIONS
Medicare Part B Preventive Services Limitations Recommendation/Date completed if known Scheduled/ Next Due   Bone Mass Measurement  (age 72 & older, biennial) Requires diagnosis related to osteoporosis or estrogen deficiency. Biennial benefit unless patient has history of long-term glucocorticoid tx or baseline is needed because initial test was by other method Completed:        Recommended every 2 years DUE: Today   Cardiovascular Screening Blood Tests (every 5 years)  Total cholesterol, HDL, Triglycerides Order as a panel if possible Completed:  1/2017    Recommended annually DUE:  1/2018   Colorectal Cancer Screening  -Fecal occult blood test (annual)  -Flexible sigmoidoscopy (5y)  -Screening colonoscopy (10y)  -Barium Enema   Completed: 2005           Recommended every 10 years DUE: Now   Counseling to Prevent Tobacco Use (up to 8 sessions per year)  - Counseling greater than 3 and up to 10 minutes  - Counseling greater than 10 minutes Patients must be asymptomatic of tobacco-related conditions to receive as preventive service       Diabetes Screening Tests (at least every 3 years, Medicare covers annually or at 6-month intervals for prediabetic patients)     Fasting blood sugar (FBS) or glucose tolerance test (GTT)       Patient must be diagnosed with one of the following:  -Hypertension, Dyslipidemia, obesity, previous impaired FBS or GTT  Or any two of the following: overweight, FH of diabetes, age ? 72, history of gestational diabetes, birth of baby weighing more than 9 pounds Completed:     1/2016     Recommended annually DUE:     1/2017   Diabetes Self-Management Training (DSMT) (no USPSTF recommendation) Requires referral by treating physician for patient with diabetes or renal disease. 10 hours of initial DSMT session of no less than 30 minutes each in a continuous 12-month period.  2 hours of follow-up DSMT in subsequent years.       Glaucoma Screening (no USPSTF recommendation) Diabetes mellitus, family history, , age 48 or over,  American, age 72 or over Completed: 10/2015           Recommended annually DUE: 10/2017   Human Immunodeficiency Virus (HIV) Screening (annually for increased risk patients)  HIV-1 and HIV-2 by EIA, SHANTI, rapid antibody test, or oral mucosa transudate Patient must be at increased risk for HIV infection per USPSTF guidelines or pregnant. Tests covered annually for patients at increased risk. Pregnant patients may receive up to 3 test during pregnancy.       Medical Nutrition Therapy (MNT) (for diabetes or renal disease not recommended schedule) Requires referral by treating physician for patient with diabetes or renal disease. Can be provided in same year as diabetes self-management training (DSMT), and CMS recommends medical nutrition therapy take place after DSMT. Up to 3 hours for initial year and 2 hours in subsequent years.       Seasonal Influenza Vaccination (annually)   Completed:       Recommended annually     DUE every Fall   Pneumococcal Vaccination (once after 65)   Completed:  11/20/2014     Hepatitis B Vaccinations (if medium/high risk) Medium/high risk factors: End-stage renal disease,  Hemophiliacs who received Factor VIII or IX concentrates, Clients of institutions for the mentally retarded, Persons who live in the same house as a HepB virus carrier, Homosexual men, Illicit injectable drug abusers.       Screening Mammography (biennial age 54-69)? Annually (age 36 or over) Completed:   6/11/2012  Recommended annually DUE:  Per Dr. Amandeep Sauceda   Screening Pap Tests and Pelvic Examination (up to age 79 and after 79 if unknown history or abnormal study last 10 years) Every 24 months except high risk Completed:           Recommended every 2 years DUE:     No longer indicated   Ultrasound Screening for Abdominal Aortic Aneurysm (AAA) (once) Patient must be referred through IPPE and not have had a screening for abdominal aortic aneurysm before under Medicare. Limited to patients who meet one of the following criteria:  - Men who are 73-68 years old and have smoked more than 100 cigarettes in their lifetime.  -Anyone with a FH of AAA  -Anyone recommended for screening by USPSTF Not covered by Medicare as preventive.        Thanks for coming in today. It was nice to spend some time with you. If you have any questions about your visit today, please call [phone] and ask for [facility]. Advance Directives: After Your Visit  Your Care Instructions  An advance directive is a legal way to state your wishes at the end of your life. It tells your family and your doctor what to do if you can no longer say what you want. There are two main types of advance directives. You can change them any time that your wishes change. · A living will tells your family and your doctor your wishes about life support and other treatment. · A medical power of  lets you name a person to make treatment decisions for you when you can't speak for yourself. This person is called a health care agent. If you do not have an advance directive, decisions about your medical care may be made by a doctor or a  who doesn't know you. It may help to think of an advance directive as a gift to the people who care for you. If you have one, they won't have to make tough decisions by themselves. Follow-up care is a key part of your treatment and safety. Be sure to make and go to all appointments, and call your doctor if you are having problems. It's also a good idea to know your test results and keep a list of the medicines you take. How can you care for yourself at home? · Discuss your wishes with your loved ones and your doctor. This way, there are no surprises. · Many states have a unique form. Or you might use a universal form that has been approved by many states. This kind of form can sometimes be completed and stored online.  Your electronic copy will then be available wherever you have a connection to the Internet. In most cases, doctors will respect your wishes even if you have a form from a different state. · You don't need a  to do an advance directive. But you may want to get legal advice. · Think about these questions when you prepare an advance directive:  ¨ Who do you want to make decisions about your medical care if you are not able to? Many people choose a family member, close friend, or doctor. ¨ Do you know enough about life support methods that might be used? If not, talk to your doctor so you understand. ¨ What are you most afraid of that might happen? You might be afraid of having pain, losing your independence, or being kept alive by machines. ¨ Where would you prefer to die? Choices include your home, a hospital, or a nursing home. ¨ Would you like to have information about hospice care to support you and your family? ¨ Do you want to donate organs when you die? ¨ Do you want certain Shinto practices performed before you die? If so, put your wishes in the advance directive. · Read your advance directive every year, and make changes as needed. When should you call for help? Be sure to contact your doctor if you have any questions. Where can you learn more? Go to [email or web address]  Enter R264 in the search box to learn more about \"Advance Directives: After Your Visit. \"   © 8982-3741 [employer]. Care instructions adapted under license by Charli Turk (which disclaims liability or warranty for this information). This care instruction is for use with your licensed healthcare professional. If you have questions about a medical condition or this instruction, always ask your healthcare professional. [place] [address] any warranty or liability for your use of this information.   Content Version: 73.7.223078; Current as of: February 20, 2015

## 2017-06-05 NOTE — PROGRESS NOTES
1. Have you been to the ER, urgent care clinic since your last visit? Hospitalized since your last visit? No    2. Have you seen or consulted any other health care providers outside of the [address] [place] [address] since your last visit? Include any pap smears or colon screening.  No

## 2017-06-05 NOTE — PROGRESS NOTES
This is a Subsequent Medicare Annual Wellness Visit providing Personalized Prevention Plan Services (PPPS) (Performed 12 months after initial AWV and PPPS )    I have reviewed the patient's medical history in detail and updated the computerized patient record. History     Past Medical History:   Diagnosis Date    Adverse effect of anesthesia 2005    took a long time to go to sleep with last colonoscopy    CAD (coronary artery disease) 2010    MI? 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  NEXIUM 40 mg capsule TAKE 1 CAPSULE DAILY 90 Cap 1    dexamethasone (DECADRON) 0.5 mg/5 mL solution Take  by mouth. Rinse with 1 teaspoon four times a day      ibuprofen (ADVIL) 200 mg tablet Take 200 mg by mouth.  losartan (COZAAR) 50 mg tablet Take 1 Tab by mouth daily. 90 Tab 3    simvastatin (ZOCOR) 20 mg tablet TAKE 1 TABLET NIGHTLY 90 Tab 4    sodium chloride (EMIR 128) 2 % ophthalmic solution Administer 1 Drop to both eyes as needed.  timolol maleate 0.5 % DrpD ophthalmic solution Administer 1 Drop to both eyes daily.  aspirin delayed-release (ASPIR-LOW) 81 mg tablet Take 81 mg by mouth daily.  Indications: CEREBRAL THROMBOEMBOLISM PREVENTION, PREVENTION OF TRANSIENT ISCHEMIC ATTACKS       Allergies   Allergen Reactions    Fosamax [Alendronate] Nausea Only     Family History   Problem Relation Age of Onset    Cancer Father Social History   Substance Use Topics    Smoking status: Former Smoker     Quit date: 1/1/1959    Smokeless tobacco: Never Used    Alcohol use 0.5 oz/week     1 Glasses of wine per week      Comment: socially     Patient Active Problem List   Diagnosis Code    HTN (hypertension) I10    Vertigo R42    GERD (gastroesophageal reflux disease) K21.9    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    TIA (transient ischemic attack) G45.9    Hyperlipidemia E78.5    EKG abnormalities R94.31    Hyperglycemia R73.9       Depression Risk Factor Screening:     PHQ over the last two weeks 1/17/2017   Little interest or pleasure in doing things Not at all   Feeling down, depressed or hopeless Not at all   Total Score PHQ 2 0     Alcohol Risk Factor Screening: On any occasion during the past 3 months, have you had more than 3 drinks containing alcohol? No    Do you average more than 7 drinks per week? No      Functional Ability and Level of Safety:     Hearing Loss   Moderate; Patient wears a hearing aid in the right ear. Activities of Daily Living   Self-care. Requires assistance with: no ADLs  ADL Assessment 6/5/2017   Feeding yourself No Help Needed   Getting from bed to chair No Help Needed   Getting dressed No Help Needed   Bathing or showering No Help Needed   Walk across the room (includes cane/walker) No Help Needed   Using the telphone No Help Needed   Taking your medications No Help Needed   Preparing meals No Help Needed   Managing money (expenses/bills) No Help Needed   Moderately strenuous housework (laundry) No Help Needed   Shopping for personal items (toiletries/medicines) No Help Needed   Shopping for groceries No Help Needed   Driving No Help Needed   Climbing a flight of stairs No Help Needed   Getting to places beyond walking distances No Help Needed     Fall Risk     Fall Risk Assessment, last 12 mths 6/5/2017   Able to walk? Yes   Fall in past 12 months?  No     Abuse Screen   Patient is not abused  Abuse Screening Questionnaire 6/5/2017   Do you ever feel afraid of your partner? N   Are you in a relationship with someone who physically or mentally threatens you? N   Is it safe for you to go home? Y       Review of Systems   Not required    Physical Examination     Evaluation of Cognitive Function:  Mood/affect:  neutral  Appearance: age appropriate and casually dressed  Family member/caregiver input: None noted during this visit. No exam performed today, Medicare Wellness Visit Completed. Patient Care Team:  Dk Grossman MD as PCP - General (Family Practice)  Carol Hess MD (Ophthalmology)    Advice/Referrals/Counseling   Education and counseling provided:  Are appropriate based on today's review and evaluation  End-of-Life planning (with patient's consent)      Assessment/Plan   Flex Tai presents today for a [employer] Visit. 1. Dexa: The patient is due per . The patient stated that she had one previously and was given fosamax, but it did not agree with her. The patient stated that she takes a calcium supplement. Pended order to Dr. Mirna Lovell for review. 2.Tdap: The patient does not recall having a booster in the last 10 years. Pended order to Dr. Mirna Lovell.     3. Prevnar 13: Due per . Order pended. 4. ACP: Patient states that a completed Advanced Medical Directive is at home. NN encouraged patient to bring a copy of the completed Advanced Medical Directive to the office for scanning into the medical record. Patient verbalized understanding & agreement. Medication reconciliation completed by MA/LPN and reviewed by PCP. Medical/surgical/social/family history reviewed and updated by PCP. Patient provided AVS and preventative screening table. Patient verbalized understanding of all information discussed. I have reviewed the information regarding the Medicare Annual Well Visit as documented by my nurse navigator; Agree with assessment.  Dk Grossman MD

## 2017-06-06 ENCOUNTER — HOSPITAL ENCOUNTER (OUTPATIENT)
Dept: GENERAL RADIOLOGY | Age: 82
Discharge: HOME | End: 2017-06-06
Attending: ORTHOPAEDIC SURGERY
Payer: MEDICARE

## 2017-06-06 ENCOUNTER — TELEPHONE (OUTPATIENT)
Dept: INTERNAL MEDICINE CLINIC | Age: 82
End: 2017-06-06

## 2017-06-06 ENCOUNTER — HOSPITAL ENCOUNTER (OUTPATIENT)
Dept: PREADMISSION TESTING | Age: 82
Discharge: HOME | End: 2017-06-06
Attending: ORTHOPAEDIC SURGERY
Payer: MEDICARE

## 2017-06-06 VITALS
BODY MASS INDEX: 29.79 KG/M2 | TEMPERATURE: 98.2 F | HEART RATE: 87 BPM | OXYGEN SATURATION: 93 % | WEIGHT: 189.82 LBS | DIASTOLIC BLOOD PRESSURE: 57 MMHG | SYSTOLIC BLOOD PRESSURE: 136 MMHG | HEIGHT: 67 IN | RESPIRATION RATE: 18 BRPM

## 2017-06-06 LAB
25(OH)D3 SERPL-MCNC: 17.6 NG/ML (ref 30–100)
ABO + RH BLD: NORMAL
ALBUMIN SERPL BCP-MCNC: 3.4 G/DL (ref 3.5–5)
ALBUMIN/GLOB SERPL: 1.1 {RATIO} (ref 1.1–2.2)
ALP SERPL-CCNC: 125 U/L (ref 45–117)
ALT SERPL-CCNC: 27 U/L (ref 12–78)
ANION GAP BLD CALC-SCNC: 8 MMOL/L (ref 5–15)
APPEARANCE UR: CLEAR
AST SERPL W P-5'-P-CCNC: 23 U/L (ref 15–37)
ATRIAL RATE: 71 BPM
BACTERIA URNS QL MICRO: NEGATIVE /HPF
BILIRUB SERPL-MCNC: 0.3 MG/DL (ref 0.2–1)
BILIRUB UR QL: NEGATIVE
BLOOD GROUP ANTIBODIES SERPL: NORMAL
BUN SERPL-MCNC: 23 MG/DL (ref 6–20)
BUN/CREAT SERPL: 31 (ref 12–20)
CALCIUM SERPL-MCNC: 9.2 MG/DL (ref 8.5–10.1)
CALCULATED P AXIS, ECG09: 49 DEGREES
CALCULATED R AXIS, ECG10: -8 DEGREES
CALCULATED T AXIS, ECG11: 49 DEGREES
CHLORIDE SERPL-SCNC: 107 MMOL/L (ref 97–108)
CO2 SERPL-SCNC: 28 MMOL/L (ref 21–32)
COLOR UR: ABNORMAL
CREAT SERPL-MCNC: 0.74 MG/DL (ref 0.55–1.02)
DIAGNOSIS, 93000: NORMAL
EPITH CASTS URNS QL MICRO: ABNORMAL /LPF
ERYTHROCYTE [DISTWIDTH] IN BLOOD BY AUTOMATED COUNT: 13.3 % (ref 11.5–14.5)
EST. AVERAGE GLUCOSE BLD GHB EST-MCNC: 143 MG/DL
GLOBULIN SER CALC-MCNC: 3.1 G/DL (ref 2–4)
GLUCOSE SERPL-MCNC: 98 MG/DL (ref 65–100)
GLUCOSE UR STRIP.AUTO-MCNC: NEGATIVE MG/DL
HBA1C MFR BLD: 6.6 % (ref 4.2–6.3)
HCT VFR BLD AUTO: 40.1 % (ref 35–47)
HGB BLD-MCNC: 13.4 G/DL (ref 11.5–16)
HGB UR QL STRIP: NEGATIVE
INR PPP: 1 (ref 0.9–1.1)
KETONES UR QL STRIP.AUTO: NEGATIVE MG/DL
LEUKOCYTE ESTERASE UR QL STRIP.AUTO: ABNORMAL
MCH RBC QN AUTO: 30.2 PG (ref 26–34)
MCHC RBC AUTO-ENTMCNC: 33.4 G/DL (ref 30–36.5)
MCV RBC AUTO: 90.5 FL (ref 80–99)
NITRITE UR QL STRIP.AUTO: NEGATIVE
P-R INTERVAL, ECG05: 128 MS
PH UR STRIP: 5.5 [PH] (ref 5–8)
PLATELET # BLD AUTO: 244 K/UL (ref 150–400)
POTASSIUM SERPL-SCNC: 3.9 MMOL/L (ref 3.5–5.1)
PREALB SERPL-MCNC: 23.6 MG/DL (ref 20–40)
PROT SERPL-MCNC: 6.5 G/DL (ref 6.4–8.2)
PROT UR STRIP-MCNC: NEGATIVE MG/DL
PROTHROMBIN TIME: 10 SEC (ref 9–11.1)
Q-T INTERVAL, ECG07: 396 MS
QRS DURATION, ECG06: 70 MS
QTC CALCULATION (BEZET), ECG08: 430 MS
RBC # BLD AUTO: 4.43 M/UL (ref 3.8–5.2)
RBC #/AREA URNS HPF: ABNORMAL /HPF (ref 0–5)
SODIUM SERPL-SCNC: 143 MMOL/L (ref 136–145)
SP GR UR REFRACTOMETRY: 1.02 (ref 1–1.03)
SPECIMEN EXP DATE BLD: NORMAL
UA: UC IF INDICATED,UAUC: ABNORMAL
UROBILINOGEN UR QL STRIP.AUTO: 0.2 EU/DL (ref 0.2–1)
VENTRICULAR RATE, ECG03: 71 BPM
WBC # BLD AUTO: 6.2 K/UL (ref 3.6–11)
WBC URNS QL MICRO: ABNORMAL /HPF (ref 0–4)

## 2017-06-06 PROCEDURE — 86900 BLOOD TYPING SEROLOGIC ABO: CPT | Performed by: ORTHOPAEDIC SURGERY

## 2017-06-06 PROCEDURE — 83036 HEMOGLOBIN GLYCOSYLATED A1C: CPT | Performed by: ORTHOPAEDIC SURGERY

## 2017-06-06 PROCEDURE — 93005 ELECTROCARDIOGRAM TRACING: CPT

## 2017-06-06 PROCEDURE — 87186 SC STD MICRODIL/AGAR DIL: CPT | Performed by: ORTHOPAEDIC SURGERY

## 2017-06-06 PROCEDURE — 80053 COMPREHEN METABOLIC PANEL: CPT | Performed by: ORTHOPAEDIC SURGERY

## 2017-06-06 PROCEDURE — 36415 COLL VENOUS BLD VENIPUNCTURE: CPT | Performed by: ORTHOPAEDIC SURGERY

## 2017-06-06 PROCEDURE — 87086 URINE CULTURE/COLONY COUNT: CPT | Performed by: ORTHOPAEDIC SURGERY

## 2017-06-06 PROCEDURE — 85027 COMPLETE CBC AUTOMATED: CPT | Performed by: ORTHOPAEDIC SURGERY

## 2017-06-06 PROCEDURE — 82306 VITAMIN D 25 HYDROXY: CPT | Performed by: ORTHOPAEDIC SURGERY

## 2017-06-06 PROCEDURE — 71020 XR CHEST PA LAT: CPT

## 2017-06-06 PROCEDURE — 85610 PROTHROMBIN TIME: CPT | Performed by: ORTHOPAEDIC SURGERY

## 2017-06-06 PROCEDURE — 84134 ASSAY OF PREALBUMIN: CPT | Performed by: ORTHOPAEDIC SURGERY

## 2017-06-06 PROCEDURE — 81001 URINALYSIS AUTO W/SCOPE: CPT | Performed by: ORTHOPAEDIC SURGERY

## 2017-06-06 PROCEDURE — 87077 CULTURE AEROBIC IDENTIFY: CPT | Performed by: ORTHOPAEDIC SURGERY

## 2017-06-06 RX ORDER — PREGABALIN 150 MG/1
150 CAPSULE ORAL ONCE
Status: CANCELLED | OUTPATIENT
Start: 2017-06-13 | End: 2017-06-13

## 2017-06-06 RX ORDER — SODIUM CHLORIDE, SODIUM LACTATE, POTASSIUM CHLORIDE, CALCIUM CHLORIDE 600; 310; 30; 20 MG/100ML; MG/100ML; MG/100ML; MG/100ML
25 INJECTION, SOLUTION INTRAVENOUS CONTINUOUS
Status: CANCELLED | OUTPATIENT
Start: 2017-06-13

## 2017-06-06 RX ORDER — ACETAMINOPHEN 500 MG
1000 TABLET ORAL ONCE
Status: CANCELLED | OUTPATIENT
Start: 2017-06-13 | End: 2017-06-13

## 2017-06-06 RX ORDER — CEFAZOLIN SODIUM IN 0.9 % NACL 2 G/100 ML
2 PLASTIC BAG, INJECTION (ML) INTRAVENOUS ONCE
Status: CANCELLED | OUTPATIENT
Start: 2017-06-13 | End: 2017-06-13

## 2017-06-06 RX ORDER — SODIUM CHLORIDE 50 MG/ML
1 SOLUTION/ DROPS OPHTHALMIC AS NEEDED
COMMUNITY
End: 2019-01-11 | Stop reason: ALTCHOICE

## 2017-06-06 NOTE — PERIOP NOTES
[facility]  Preoperative Instructions        Surgery Date 06/13/17          Time of Arrival 1030    1. On the day of your surgery, please report to the Surgical Services Registration Desk and sign in at your designated time. The Surgery Center is located to the right of the Emergency Room. 2. You must have someone with you to drive you home. You should not drive a car for 24 hours following surgery. Please make arrangements for a friend or family member to stay with you for the first 24 hours after your surgery. 3. Do not have anything to eat or drink (including water, gum, mints, coffee, juice) after midnight 06/12/17              . This may not apply to medications prescribed by your physician. Please note special instructions, if applicable. If you are currently taking Plavix, Coumadin, or other blood-thinning agents, contact your surgeon for instructions. 4. We recommend you do not drink any alcoholic beverages for 24 hours before and after your surgery. 5. Have a list of all current medications, including vitamins, herbal supplements and any other over the counter medications. Stop all Aspirin and non-steroidal anti-inflammatory drugs (I.e. Advil, Aleve), as directed by your surgeon's office. Stop all vitamins and herbal supplements seven days prior to your surgery. 6. Wear comfortable clothes. Wear glasses instead of contacts. Do not bring any money or jewelry. Please bring picture ID, insurance card, and any prearranged co-payment or hospital payment. Do not wear make-up, particularly mascara the morning of your surgery. Do not wear nail polish, particularly if you are having foot /hand surgery. Wear your hair loose or down, no ponytails, buns, luigi pins or clips. All body piercings must be removed.   Please shower with antibacterial soap for three consecutive days before and on the morning of surgery, but do not apply any lotions, powders or deodorants after the shower on the day of surgery. Please use a fresh towels after each shower. Please sleep in clean clothes and change bed linens the night before surgery. Please do not shave for 48 hours prior to surgery. Shaving of the face is acceptable. 7. You should understand that if you do not follow these instructions your surgery may be cancelled. If your physical condition changes (I.e. fever, cold or flu) please contact your surgeon as soon as possible. 8. It is important that you be on time. If a situation occurs where you may be late, please call [phone] (OR Holding Area). 9. If you have any questions and or problems, please call [phone] (Pre-admission Testing). 10. Your surgery time may be subject to change. You will receive a phone call the evening prior if your time changes. 11.  If having outpatient surgery, you must have someone to drive you here, stay with you during the duration of your stay, and to drive you home at time of discharge. Special Instructions:    MEDICATIONS TO TAKE THE MORNING OF SURGERY WITH A SIP OF WATER: timolol eye drop, nexium, sodium chloride eye drops      I understand a pre-operative phone call will be made to verify my surgery time. In the event that I am not available, I give permission for a message to be left on my answering service and/or with another person?   yes         ___________________      __________   _________    (Signature of Patient)             (Witness)                (Date and Time)

## 2017-06-06 NOTE — PERIOP NOTES
Spoke with Leonardo Maradiaga in the lab at [facility] ER about my CMP results not back yet, she said that it has already been sent to [facility] for prealbumin to be run. She will call [facility] lab and have them run Daniella Her[address]is [address].

## 2017-06-07 LAB
BACTERIA SPEC CULT: NORMAL
BACTERIA SPEC CULT: NORMAL
SERVICE CMNT-IMP: NORMAL

## 2017-06-07 NOTE — PERIOP NOTES
Results of Comprehensive Metabolic Panel and vitamin D done on 06/06/17 faxed to Dr. John Holm. Fax confirmation reviewed and placed in paper chart.

## 2017-06-09 LAB
BACTERIA SPEC CULT: ABNORMAL
CC UR VC: ABNORMAL
SERVICE CMNT-IMP: ABNORMAL

## 2017-06-09 NOTE — PERIOP NOTES
Urine culture results faxed to Dr. Tessy Acosta' office, asking if any follow up indicated. Fax confirmed.

## 2017-06-12 RX ORDER — LEVOFLOXACIN 250 MG/1
250 TABLET ORAL DAILY
Status: ON HOLD | COMMUNITY
Start: 2017-06-12 | End: 2017-06-19 | Stop reason: CLARIF

## 2017-06-12 RX ORDER — CHOLECALCIFEROL (VITAMIN D3) 125 MCG
1 CAPSULE ORAL DAILY
COMMUNITY
End: 2021-11-04

## 2017-06-12 NOTE — PERIOP NOTES
Winston Fuentes called from Dr. Temple Lennox office and patient is getting treated with levaquin 250mg PO x 3 days and surgery is getting rescheduled.

## 2017-06-15 ENCOUNTER — HOSPITAL ENCOUNTER (OUTPATIENT)
Dept: PREADMISSION TESTING | Age: 82
Discharge: HOME | End: 2017-06-15
Attending: ORTHOPAEDIC SURGERY
Payer: MEDICARE

## 2017-06-15 LAB
APPEARANCE UR: CLEAR
BACTERIA URNS QL MICRO: NEGATIVE /HPF
BILIRUB UR QL: NEGATIVE
COLOR UR: ABNORMAL
EPITH CASTS URNS QL MICRO: ABNORMAL /LPF
GLUCOSE UR STRIP.AUTO-MCNC: NEGATIVE MG/DL
HGB UR QL STRIP: NEGATIVE
KETONES UR QL STRIP.AUTO: NEGATIVE MG/DL
LEUKOCYTE ESTERASE UR QL STRIP.AUTO: ABNORMAL
MUCOUS THREADS URNS QL MICRO: ABNORMAL /LPF
NITRITE UR QL STRIP.AUTO: NEGATIVE
OTHER,OTHU: ABNORMAL
PH UR STRIP: 6 [PH] (ref 5–8)
PROT UR STRIP-MCNC: NEGATIVE MG/DL
RBC #/AREA URNS HPF: ABNORMAL /HPF (ref 0–5)
SP GR UR REFRACTOMETRY: 1.01 (ref 1–1.03)
UA: UC IF INDICATED,UAUC: ABNORMAL
UROBILINOGEN UR QL STRIP.AUTO: 0.2 EU/DL (ref 0.2–1)
WBC URNS QL MICRO: ABNORMAL /HPF (ref 0–4)

## 2017-06-15 PROCEDURE — 81001 URINALYSIS AUTO W/SCOPE: CPT | Performed by: ORTHOPAEDIC SURGERY

## 2017-06-18 ENCOUNTER — ANESTHESIA EVENT (OUTPATIENT)
Dept: SURGERY | Age: 82
End: 2017-06-18
Payer: MEDICARE

## 2017-06-19 ENCOUNTER — APPOINTMENT (OUTPATIENT)
Dept: GENERAL RADIOLOGY | Age: 82
End: 2017-06-19
Attending: ORTHOPAEDIC SURGERY
Payer: MEDICARE

## 2017-06-19 ENCOUNTER — ANESTHESIA (OUTPATIENT)
Dept: SURGERY | Age: 82
End: 2017-06-19
Payer: MEDICARE

## 2017-06-19 ENCOUNTER — HOSPITAL ENCOUNTER (OUTPATIENT)
Age: 82
Setting detail: OBSERVATION
Discharge: HOME HEALTH | End: 2017-06-20
Attending: ORTHOPAEDIC SURGERY | Admitting: ORTHOPAEDIC SURGERY
Payer: MEDICARE

## 2017-06-19 PROCEDURE — 77030014647 HC SEAL FBRN TISSL BAXT -D: Performed by: ORTHOPAEDIC SURGERY

## 2017-06-19 PROCEDURE — 99218 HC RM OBSERVATION: CPT

## 2017-06-19 PROCEDURE — 76060000033 HC ANESTHESIA 1 TO 1.5 HR: Performed by: ORTHOPAEDIC SURGERY

## 2017-06-19 PROCEDURE — 77030033138 HC SUT PGA STRATFX J&J -B: Performed by: ORTHOPAEDIC SURGERY

## 2017-06-19 PROCEDURE — 77030034849: Performed by: ORTHOPAEDIC SURGERY

## 2017-06-19 PROCEDURE — 77030018836 HC SOL IRR NACL ICUM -A: Performed by: ORTHOPAEDIC SURGERY

## 2017-06-19 PROCEDURE — 77030026438 HC STYL ET INTUB CARD -A: Performed by: ANESTHESIOLOGY

## 2017-06-19 PROCEDURE — 77030011266 HC ELECTRD BLD INSL COVD -A: Performed by: ORTHOPAEDIC SURGERY

## 2017-06-19 PROCEDURE — 74011000250 HC RX REV CODE- 250: Performed by: ORTHOPAEDIC SURGERY

## 2017-06-19 PROCEDURE — 76010000161 HC OR TIME 1 TO 1.5 HR INTENSV-TIER 1: Performed by: ORTHOPAEDIC SURGERY

## 2017-06-19 PROCEDURE — 77030013079 HC BLNKT BAIR HGGR 3M -A: Performed by: ANESTHESIOLOGY

## 2017-06-19 PROCEDURE — 77030008467 HC STPLR SKN COVD -B: Performed by: ORTHOPAEDIC SURGERY

## 2017-06-19 PROCEDURE — 74011250636 HC RX REV CODE- 250/636: Performed by: ORTHOPAEDIC SURGERY

## 2017-06-19 PROCEDURE — 77030003029 HC SUT VCRL J&J -B: Performed by: ORTHOPAEDIC SURGERY

## 2017-06-19 PROCEDURE — 77030019908 HC STETH ESOPH SIMS -A: Performed by: ANESTHESIOLOGY

## 2017-06-19 PROCEDURE — 77030012961 HC IRR KT CYSTO/TUR ICUM -A: Performed by: ORTHOPAEDIC SURGERY

## 2017-06-19 PROCEDURE — 77030016570 HC BLNKT BAIR HGGR 3M -B: Performed by: ORTHOPAEDIC SURGERY

## 2017-06-19 PROCEDURE — 77030018846 HC SOL IRR STRL H20 ICUM -A: Performed by: ORTHOPAEDIC SURGERY

## 2017-06-19 PROCEDURE — 77030031139 HC SUT VCRL2 J&J -A: Performed by: ORTHOPAEDIC SURGERY

## 2017-06-19 PROCEDURE — 77030008684 HC TU ET CUF COVD -B: Performed by: ANESTHESIOLOGY

## 2017-06-19 PROCEDURE — 76210000006 HC OR PH I REC 0.5 TO 1 HR: Performed by: ORTHOPAEDIC SURGERY

## 2017-06-19 PROCEDURE — 74011250637 HC RX REV CODE- 250/637: Performed by: ORTHOPAEDIC SURGERY

## 2017-06-19 PROCEDURE — 76001 XR FLUOROSCOPY OVER 60 MINUTES: CPT

## 2017-06-19 PROCEDURE — 77030029099 HC BN WAX SSPC -A: Performed by: ORTHOPAEDIC SURGERY

## 2017-06-19 PROCEDURE — 74011250636 HC RX REV CODE- 250/636: Performed by: ANESTHESIOLOGY

## 2017-06-19 PROCEDURE — 74011000272 HC RX REV CODE- 272: Performed by: ORTHOPAEDIC SURGERY

## 2017-06-19 PROCEDURE — 77030012935 HC DRSG AQUACEL BMS -B: Performed by: ORTHOPAEDIC SURGERY

## 2017-06-19 PROCEDURE — 74011000258 HC RX REV CODE- 258: Performed by: ORTHOPAEDIC SURGERY

## 2017-06-19 PROCEDURE — 72100 X-RAY EXAM L-S SPINE 2/3 VWS: CPT

## 2017-06-19 PROCEDURE — 77030003666 HC NDL SPINAL BD -A: Performed by: ORTHOPAEDIC SURGERY

## 2017-06-19 PROCEDURE — 77030032490 HC SLV COMPR SCD KNE COVD -B: Performed by: ORTHOPAEDIC SURGERY

## 2017-06-19 PROCEDURE — 74011250636 HC RX REV CODE- 250/636

## 2017-06-19 PROCEDURE — 77030022704 HC SUT VLOC COVD -B: Performed by: ORTHOPAEDIC SURGERY

## 2017-06-19 PROCEDURE — 77030004391 HC BUR FLUT MEDT -C: Performed by: ORTHOPAEDIC SURGERY

## 2017-06-19 PROCEDURE — 74011000250 HC RX REV CODE- 250

## 2017-06-19 RX ORDER — ASPIRIN 81 MG/1
81 TABLET ORAL DAILY
Status: DISCONTINUED | OUTPATIENT
Start: 2017-06-20 | End: 2017-06-20 | Stop reason: HOSPADM

## 2017-06-19 RX ORDER — CEFAZOLIN SODIUM IN 0.9 % NACL 2 G/100 ML
2 PLASTIC BAG, INJECTION (ML) INTRAVENOUS
Status: COMPLETED | OUTPATIENT
Start: 2017-06-19 | End: 2017-06-19

## 2017-06-19 RX ORDER — PROPOFOL 10 MG/ML
INJECTION, EMULSION INTRAVENOUS AS NEEDED
Status: DISCONTINUED | OUTPATIENT
Start: 2017-06-19 | End: 2017-06-19 | Stop reason: HOSPADM

## 2017-06-19 RX ORDER — SODIUM CHLORIDE 0.9 % (FLUSH) 0.9 %
5-10 SYRINGE (ML) INJECTION EVERY 8 HOURS
Status: DISCONTINUED | OUTPATIENT
Start: 2017-06-20 | End: 2017-06-20 | Stop reason: HOSPADM

## 2017-06-19 RX ORDER — SODIUM CHLORIDE 9 MG/ML
125 INJECTION, SOLUTION INTRAVENOUS CONTINUOUS
Status: DISCONTINUED | OUTPATIENT
Start: 2017-06-19 | End: 2017-06-20 | Stop reason: HOSPADM

## 2017-06-19 RX ORDER — DIPHENHYDRAMINE HYDROCHLORIDE 50 MG/ML
12.5 INJECTION, SOLUTION INTRAMUSCULAR; INTRAVENOUS AS NEEDED
Status: DISCONTINUED | OUTPATIENT
Start: 2017-06-19 | End: 2017-06-19 | Stop reason: HOSPADM

## 2017-06-19 RX ORDER — PRAVASTATIN SODIUM 40 MG/1
40 TABLET ORAL
Status: DISCONTINUED | OUTPATIENT
Start: 2017-06-19 | End: 2017-06-20 | Stop reason: HOSPADM

## 2017-06-19 RX ORDER — HYDROXYZINE HYDROCHLORIDE 10 MG/1
10 TABLET, FILM COATED ORAL
Status: DISCONTINUED | OUTPATIENT
Start: 2017-06-19 | End: 2017-06-20 | Stop reason: HOSPADM

## 2017-06-19 RX ORDER — DIAZEPAM 5 MG/1
5 TABLET ORAL
Status: DISCONTINUED | OUTPATIENT
Start: 2017-06-19 | End: 2017-06-20 | Stop reason: HOSPADM

## 2017-06-19 RX ORDER — MELATONIN
2000 DAILY
Status: DISCONTINUED | OUTPATIENT
Start: 2017-06-20 | End: 2017-06-20 | Stop reason: HOSPADM

## 2017-06-19 RX ORDER — ONDANSETRON 2 MG/ML
4 INJECTION INTRAMUSCULAR; INTRAVENOUS
Status: DISCONTINUED | OUTPATIENT
Start: 2017-06-19 | End: 2017-06-20 | Stop reason: HOSPADM

## 2017-06-19 RX ORDER — PREGABALIN 75 MG/1
150 CAPSULE ORAL ONCE
Status: COMPLETED | OUTPATIENT
Start: 2017-06-19 | End: 2017-06-19

## 2017-06-19 RX ORDER — FACIAL-BODY WIPES
10 EACH TOPICAL DAILY PRN
Status: DISCONTINUED | OUTPATIENT
Start: 2017-06-21 | End: 2017-06-20 | Stop reason: HOSPADM

## 2017-06-19 RX ORDER — DEXAMETHASONE SODIUM PHOSPHATE 4 MG/ML
10 INJECTION, SOLUTION INTRA-ARTICULAR; INTRALESIONAL; INTRAMUSCULAR; INTRAVENOUS; SOFT TISSUE ONCE
Status: DISCONTINUED | OUTPATIENT
Start: 2017-06-20 | End: 2017-06-20 | Stop reason: HOSPADM

## 2017-06-19 RX ORDER — POLYETHYLENE GLYCOL 3350 17 G/17G
17 POWDER, FOR SOLUTION ORAL DAILY
Status: DISCONTINUED | OUTPATIENT
Start: 2017-06-20 | End: 2017-06-20 | Stop reason: HOSPADM

## 2017-06-19 RX ORDER — GABAPENTIN 300 MG/1
300 CAPSULE ORAL 3 TIMES DAILY
Status: DISCONTINUED | OUTPATIENT
Start: 2017-06-19 | End: 2017-06-20 | Stop reason: HOSPADM

## 2017-06-19 RX ORDER — TRAMADOL HYDROCHLORIDE 50 MG/1
50 TABLET ORAL
COMMUNITY
End: 2017-06-20

## 2017-06-19 RX ORDER — HYDROMORPHONE HYDROCHLORIDE 1 MG/ML
0.2 INJECTION, SOLUTION INTRAMUSCULAR; INTRAVENOUS; SUBCUTANEOUS
Status: DISCONTINUED | OUTPATIENT
Start: 2017-06-19 | End: 2017-06-19 | Stop reason: HOSPADM

## 2017-06-19 RX ORDER — FENTANYL CITRATE 50 UG/ML
25 INJECTION, SOLUTION INTRAMUSCULAR; INTRAVENOUS
Status: DISCONTINUED | OUTPATIENT
Start: 2017-06-19 | End: 2017-06-19 | Stop reason: HOSPADM

## 2017-06-19 RX ORDER — SUCCINYLCHOLINE CHLORIDE 20 MG/ML
INJECTION INTRAMUSCULAR; INTRAVENOUS AS NEEDED
Status: DISCONTINUED | OUTPATIENT
Start: 2017-06-19 | End: 2017-06-19 | Stop reason: HOSPADM

## 2017-06-19 RX ORDER — ONDANSETRON 2 MG/ML
INJECTION INTRAMUSCULAR; INTRAVENOUS AS NEEDED
Status: DISCONTINUED | OUTPATIENT
Start: 2017-06-19 | End: 2017-06-19 | Stop reason: HOSPADM

## 2017-06-19 RX ORDER — TIMOLOL MALEATE 5 MG/ML
1 SOLUTION/ DROPS OPHTHALMIC DAILY
Status: DISCONTINUED | OUTPATIENT
Start: 2017-06-20 | End: 2017-06-20 | Stop reason: HOSPADM

## 2017-06-19 RX ORDER — FAMOTIDINE 20 MG/1
20 TABLET, FILM COATED ORAL 2 TIMES DAILY
Status: DISCONTINUED | OUTPATIENT
Start: 2017-06-19 | End: 2017-06-19 | Stop reason: SDUPTHER

## 2017-06-19 RX ORDER — KETOROLAC TROMETHAMINE 30 MG/ML
INJECTION, SOLUTION INTRAMUSCULAR; INTRAVENOUS
Status: DISPENSED
Start: 2017-06-19 | End: 2017-06-20

## 2017-06-19 RX ORDER — OXYCODONE HYDROCHLORIDE 5 MG/1
5 TABLET ORAL
Status: DISCONTINUED | OUTPATIENT
Start: 2017-06-19 | End: 2017-06-20 | Stop reason: HOSPADM

## 2017-06-19 RX ORDER — ACETAMINOPHEN 500 MG
1000 TABLET ORAL ONCE
Status: COMPLETED | OUTPATIENT
Start: 2017-06-19 | End: 2017-06-19

## 2017-06-19 RX ORDER — OXYCODONE AND ACETAMINOPHEN 5; 325 MG/1; MG/1
1 TABLET ORAL AS NEEDED
Status: DISCONTINUED | OUTPATIENT
Start: 2017-06-19 | End: 2017-06-19 | Stop reason: HOSPADM

## 2017-06-19 RX ORDER — MORPHINE SULFATE 10 MG/ML
INJECTION, SOLUTION INTRAMUSCULAR; INTRAVENOUS AS NEEDED
Status: DISCONTINUED | OUTPATIENT
Start: 2017-06-19 | End: 2017-06-19 | Stop reason: HOSPADM

## 2017-06-19 RX ORDER — NALOXONE HYDROCHLORIDE 0.4 MG/ML
0.4 INJECTION, SOLUTION INTRAMUSCULAR; INTRAVENOUS; SUBCUTANEOUS AS NEEDED
Status: DISCONTINUED | OUTPATIENT
Start: 2017-06-19 | End: 2017-06-20 | Stop reason: HOSPADM

## 2017-06-19 RX ORDER — VALSARTAN 80 MG/1
80 TABLET ORAL DAILY
Status: DISCONTINUED | OUTPATIENT
Start: 2017-06-20 | End: 2017-06-20 | Stop reason: HOSPADM

## 2017-06-19 RX ORDER — MORPHINE SULFATE 10 MG/ML
2 INJECTION, SOLUTION INTRAMUSCULAR; INTRAVENOUS
Status: DISCONTINUED | OUTPATIENT
Start: 2017-06-19 | End: 2017-06-19 | Stop reason: HOSPADM

## 2017-06-19 RX ORDER — FENTANYL CITRATE 50 UG/ML
INJECTION, SOLUTION INTRAMUSCULAR; INTRAVENOUS AS NEEDED
Status: DISCONTINUED | OUTPATIENT
Start: 2017-06-19 | End: 2017-06-19 | Stop reason: HOSPADM

## 2017-06-19 RX ORDER — SODIUM CHLORIDE, SODIUM LACTATE, POTASSIUM CHLORIDE, CALCIUM CHLORIDE 600; 310; 30; 20 MG/100ML; MG/100ML; MG/100ML; MG/100ML
25 INJECTION, SOLUTION INTRAVENOUS CONTINUOUS
Status: DISCONTINUED | OUTPATIENT
Start: 2017-06-19 | End: 2017-06-19

## 2017-06-19 RX ORDER — OXYCODONE HYDROCHLORIDE 5 MG/1
10-15 TABLET ORAL
Status: DISCONTINUED | OUTPATIENT
Start: 2017-06-19 | End: 2017-06-20 | Stop reason: HOSPADM

## 2017-06-19 RX ORDER — DEXAMETHASONE SODIUM PHOSPHATE 4 MG/ML
INJECTION, SOLUTION INTRA-ARTICULAR; INTRALESIONAL; INTRAMUSCULAR; INTRAVENOUS; SOFT TISSUE AS NEEDED
Status: DISCONTINUED | OUTPATIENT
Start: 2017-06-19 | End: 2017-06-19 | Stop reason: HOSPADM

## 2017-06-19 RX ORDER — LIDOCAINE HYDROCHLORIDE 20 MG/ML
INJECTION, SOLUTION EPIDURAL; INFILTRATION; INTRACAUDAL; PERINEURAL AS NEEDED
Status: DISCONTINUED | OUTPATIENT
Start: 2017-06-19 | End: 2017-06-19 | Stop reason: HOSPADM

## 2017-06-19 RX ORDER — FENTANYL CITRATE 50 UG/ML
50 INJECTION, SOLUTION INTRAMUSCULAR; INTRAVENOUS AS NEEDED
Status: DISCONTINUED | OUTPATIENT
Start: 2017-06-19 | End: 2017-06-19 | Stop reason: HOSPADM

## 2017-06-19 RX ORDER — ACETAMINOPHEN 500 MG
1000 TABLET ORAL EVERY 6 HOURS
Status: DISCONTINUED | OUTPATIENT
Start: 2017-06-19 | End: 2017-06-20 | Stop reason: HOSPADM

## 2017-06-19 RX ORDER — SODIUM CHLORIDE 0.9 % (FLUSH) 0.9 %
5-10 SYRINGE (ML) INJECTION AS NEEDED
Status: DISCONTINUED | OUTPATIENT
Start: 2017-06-19 | End: 2017-06-20 | Stop reason: HOSPADM

## 2017-06-19 RX ORDER — MIDAZOLAM HYDROCHLORIDE 1 MG/ML
0.5 INJECTION, SOLUTION INTRAMUSCULAR; INTRAVENOUS
Status: DISCONTINUED | OUTPATIENT
Start: 2017-06-19 | End: 2017-06-19 | Stop reason: HOSPADM

## 2017-06-19 RX ORDER — SODIUM CHLORIDE, SODIUM LACTATE, POTASSIUM CHLORIDE, CALCIUM CHLORIDE 600; 310; 30; 20 MG/100ML; MG/100ML; MG/100ML; MG/100ML
125 INJECTION, SOLUTION INTRAVENOUS CONTINUOUS
Status: DISCONTINUED | OUTPATIENT
Start: 2017-06-19 | End: 2017-06-19 | Stop reason: HOSPADM

## 2017-06-19 RX ORDER — SODIUM CHLORIDE 9 MG/ML
25 INJECTION, SOLUTION INTRAVENOUS CONTINUOUS
Status: DISCONTINUED | OUTPATIENT
Start: 2017-06-19 | End: 2017-06-19 | Stop reason: HOSPADM

## 2017-06-19 RX ORDER — SODIUM CHLORIDE 0.9 % (FLUSH) 0.9 %
5-10 SYRINGE (ML) INJECTION AS NEEDED
Status: DISCONTINUED | OUTPATIENT
Start: 2017-06-19 | End: 2017-06-19 | Stop reason: HOSPADM

## 2017-06-19 RX ORDER — ROCURONIUM BROMIDE 10 MG/ML
INJECTION, SOLUTION INTRAVENOUS AS NEEDED
Status: DISCONTINUED | OUTPATIENT
Start: 2017-06-19 | End: 2017-06-19 | Stop reason: HOSPADM

## 2017-06-19 RX ORDER — HYDROMORPHONE HYDROCHLORIDE 1 MG/ML
1 INJECTION, SOLUTION INTRAMUSCULAR; INTRAVENOUS; SUBCUTANEOUS
Status: DISCONTINUED | OUTPATIENT
Start: 2017-06-19 | End: 2017-06-20 | Stop reason: HOSPADM

## 2017-06-19 RX ORDER — CEFAZOLIN SODIUM IN 0.9 % NACL 2 G/100 ML
2 PLASTIC BAG, INJECTION (ML) INTRAVENOUS EVERY 8 HOURS
Status: COMPLETED | OUTPATIENT
Start: 2017-06-20 | End: 2017-06-20

## 2017-06-19 RX ORDER — SODIUM CHLORIDE 50 MG/ML
1 SOLUTION/ DROPS OPHTHALMIC AS NEEDED
Status: DISCONTINUED | OUTPATIENT
Start: 2017-06-19 | End: 2017-06-20 | Stop reason: HOSPADM

## 2017-06-19 RX ORDER — SODIUM CHLORIDE 0.9 % (FLUSH) 0.9 %
5-10 SYRINGE (ML) INJECTION EVERY 8 HOURS
Status: DISCONTINUED | OUTPATIENT
Start: 2017-06-19 | End: 2017-06-19 | Stop reason: HOSPADM

## 2017-06-19 RX ORDER — PANTOPRAZOLE SODIUM 40 MG/1
40 TABLET, DELAYED RELEASE ORAL
Status: DISCONTINUED | OUTPATIENT
Start: 2017-06-19 | End: 2017-06-20 | Stop reason: HOSPADM

## 2017-06-19 RX ORDER — KETOROLAC TROMETHAMINE 30 MG/ML
15 INJECTION, SOLUTION INTRAMUSCULAR; INTRAVENOUS EVERY 6 HOURS
Status: COMPLETED | OUTPATIENT
Start: 2017-06-19 | End: 2017-06-20

## 2017-06-19 RX ORDER — AMOXICILLIN 250 MG
1 CAPSULE ORAL 2 TIMES DAILY
Status: DISCONTINUED | OUTPATIENT
Start: 2017-06-19 | End: 2017-06-20 | Stop reason: HOSPADM

## 2017-06-19 RX ORDER — MIDAZOLAM HYDROCHLORIDE 1 MG/ML
1 INJECTION, SOLUTION INTRAMUSCULAR; INTRAVENOUS AS NEEDED
Status: DISCONTINUED | OUTPATIENT
Start: 2017-06-19 | End: 2017-06-19 | Stop reason: HOSPADM

## 2017-06-19 RX ORDER — ONDANSETRON 2 MG/ML
4 INJECTION INTRAMUSCULAR; INTRAVENOUS AS NEEDED
Status: DISCONTINUED | OUTPATIENT
Start: 2017-06-19 | End: 2017-06-19 | Stop reason: HOSPADM

## 2017-06-19 RX ORDER — LIDOCAINE HYDROCHLORIDE 10 MG/ML
0.1 INJECTION, SOLUTION EPIDURAL; INFILTRATION; INTRACAUDAL; PERINEURAL AS NEEDED
Status: DISCONTINUED | OUTPATIENT
Start: 2017-06-19 | End: 2017-06-19 | Stop reason: HOSPADM

## 2017-06-19 RX ADMIN — CEFAZOLIN 2 G: 10 INJECTION, POWDER, FOR SOLUTION INTRAVENOUS; PARENTERAL at 16:49

## 2017-06-19 RX ADMIN — ACETAMINOPHEN 1000 MG: 500 TABLET ORAL at 13:17

## 2017-06-19 RX ADMIN — FENTANYL CITRATE 50 MCG: 50 INJECTION, SOLUTION INTRAMUSCULAR; INTRAVENOUS at 16:30

## 2017-06-19 RX ADMIN — SODIUM CHLORIDE, POTASSIUM CHLORIDE, SODIUM LACTATE AND CALCIUM CHLORIDE: 600; 310; 30; 20 INJECTION, SOLUTION INTRAVENOUS at 16:01

## 2017-06-19 RX ADMIN — KETOROLAC TROMETHAMINE 15 MG: 30 INJECTION, SOLUTION INTRAMUSCULAR at 18:45

## 2017-06-19 RX ADMIN — PREGABALIN 150 MG: 75 CAPSULE ORAL at 13:17

## 2017-06-19 RX ADMIN — FENTANYL CITRATE 50 MCG: 50 INJECTION, SOLUTION INTRAMUSCULAR; INTRAVENOUS at 16:25

## 2017-06-19 RX ADMIN — ACETAMINOPHEN 1000 MG: 500 TABLET ORAL at 19:53

## 2017-06-19 RX ADMIN — SODIUM CHLORIDE 125 ML/HR: 900 INJECTION, SOLUTION INTRAVENOUS at 18:42

## 2017-06-19 RX ADMIN — ONDANSETRON 4 MG: 2 INJECTION INTRAMUSCULAR; INTRAVENOUS at 16:45

## 2017-06-19 RX ADMIN — ROCURONIUM BROMIDE 5 MG: 10 INJECTION, SOLUTION INTRAVENOUS at 16:30

## 2017-06-19 RX ADMIN — PROPOFOL 120 MG: 10 INJECTION, EMULSION INTRAVENOUS at 16:30

## 2017-06-19 RX ADMIN — DEXAMETHASONE SODIUM PHOSPHATE 8 MG: 4 INJECTION, SOLUTION INTRA-ARTICULAR; INTRALESIONAL; INTRAMUSCULAR; INTRAVENOUS; SOFT TISSUE at 16:45

## 2017-06-19 RX ADMIN — MORPHINE SULFATE 2 MG: 10 INJECTION, SOLUTION INTRAMUSCULAR; INTRAVENOUS at 17:22

## 2017-06-19 RX ADMIN — KETOROLAC TROMETHAMINE 15 MG: 30 INJECTION, SOLUTION INTRAMUSCULAR at 23:46

## 2017-06-19 RX ADMIN — LIDOCAINE HYDROCHLORIDE 40 MG: 20 INJECTION, SOLUTION EPIDURAL; INFILTRATION; INTRACAUDAL; PERINEURAL at 16:30

## 2017-06-19 RX ADMIN — PANTOPRAZOLE SODIUM 40 MG: 40 TABLET, DELAYED RELEASE ORAL at 20:28

## 2017-06-19 RX ADMIN — SENNOSIDES AND DOCUSATE SODIUM 1 TABLET: 8.6; 5 TABLET ORAL at 19:53

## 2017-06-19 RX ADMIN — ROPIVACAINE HYDROCHLORIDE: 5 INJECTION, SOLUTION EPIDURAL; INFILTRATION; PERINEURAL at 18:00

## 2017-06-19 RX ADMIN — SODIUM CHLORIDE, POTASSIUM CHLORIDE, SODIUM LACTATE AND CALCIUM CHLORIDE: 600; 310; 30; 20 INJECTION, SOLUTION INTRAVENOUS at 16:54

## 2017-06-19 RX ADMIN — SUCCINYLCHOLINE CHLORIDE 120 MG: 20 INJECTION INTRAMUSCULAR; INTRAVENOUS at 16:30

## 2017-06-19 NOTE — BRIEF OP NOTE
BRIEF OPERATIVE NOTE    Date of Procedure: 6/19/2017   Preoperative Diagnosis: LUMBAGO, RADICULOPATHY, HNP  Postoperative Diagnosis: LUMBAGO, RADICULOPATHY, HNP L4-5    Procedure(s):  L4-5 DISCECTOMY  Surgeon(s) and Role:     * Mj Nicolas MD - Primary         Assistant Staff:  Physician Assistant: David Bonds    Surgical Staff:  Circ-1: Christianne Brandon RN  Circ-Relief: Zbigniew Xiao RN  Physician Assistant: MARLY Bonds  Scrub Tech-1: Winston Abraham  Scrub Tech-2: Marcio Barry  Scrub RN-2: Leonor Lindsey RN  Event Time In   Incision Start 1702   Incision Close      Anesthesia: General   Estimated Blood Loss: 25cc  Specimens: * No specimens in log *   Findings: hnp   Complications: none  Implants: * No implants in log *

## 2017-06-19 NOTE — PERIOP NOTES
Handoff Report from Operating Room to PACU    Report received from Christopher Aponte CRNA  and Rosana Cervantes RN  regarding Brain Merritts. Surgeon(s):  Nisa Corral MD  And Procedure(s) (LRB):  L4-5 DISCECTOMY (Bilateral)  confirmed   with allergies, drains and dressings discussed. Anesthesia type, drugs, patient history, complications, estimated blood loss, vital signs, intake and output, and last pain medication and reversal medications were reviewed.

## 2017-06-19 NOTE — PERIOP NOTES
TRANSFER - OUT REPORT:    Verbal report given to Steve Grsos RN (name) on Jose Armando Terry  being transferred to Pending sale to [facility](unit) for routine post - op       Report consisted of patients Situation, Background, Assessment and   Recommendations(SBAR). Information from the following report(s) SBAR, Kardex, OR Summary, Intake/Output and MAR was reviewed with the receiving nurse. Opportunity for questions and clarification was provided.       Patient transported with:   O2 @ 2 liters  Registered Nurse

## 2017-06-19 NOTE — ANESTHESIA PREPROCEDURE EVALUATION
Anesthetic History   No history of anesthetic complications            Review of Systems / Medical History  Patient summary reviewed, nursing notes reviewed and pertinent labs reviewed    Pulmonary  Within defined limits                 Neuro/Psych   Within defined limits           Cardiovascular    Hypertension: well controlled              Exercise tolerance: >4 METS     GI/Hepatic/Renal     GERD: well controlled           Endo/Other  Within defined limits           Other Findings            Physical Exam    Airway  Mallampati: II  TM Distance: > 6 cm  Neck ROM: normal range of motion   Mouth opening: Normal     Cardiovascular  Regular rate and rhythm,  S1 and S2 normal,  no murmur, click, rub, or gallop             Dental  No notable dental hx       Pulmonary  Breath sounds clear to auscultation               Abdominal  GI exam deferred       Other Findings            Anesthetic Plan    ASA: 2  Anesthesia type: general          Induction: Intravenous  Anesthetic plan and risks discussed with: Patient

## 2017-06-19 NOTE — PROGRESS NOTES
Patient arrived to unit via stretcher. Patient able to scoot over from stretcher to bed without difficulty. Dressing dry and intact to lower mid back. No pressure areas noted to skin.

## 2017-06-19 NOTE — IP AVS SNAPSHOT
[address] [facility] 
[phone] Patient: Bianca Villalta MRN: [id number] :1935 You are allergic to the following Allergen Reactions Fosamax (Alendronate) Nausea Only Recent Documentation Height Weight Breastfeeding? BMI OB Status Smoking Status 1.689 m 86.3 kg No 30.25 kg/m2 Postmenopausal Former Smoker Emergency Contacts Name Discharge Info Relation Home Work Mobile Jose R De Leon  Child [2] [phone] About your hospitalization You were admitted on:  2017 You last received care in the:  [facility] ORTHOPEDICS You were discharged on:  2017 Unit phone number:  [phone] Why you were hospitalized Your primary diagnosis was:  Not on File Providers Seen During Your Hospitalizations Provider Role Specialty Primary office phone Soham Newell MD Attending Provider Orthopedic Surgery [phone] Your Primary Care Physician (PCP) Primary Care Physician Office Phone Office Fax Antelmo Tierney [phone] Follow-up Information Follow up With Details Comments Contact Info Soham Newell MD Schedule an appointment as soon as possible for a visit in 1 week   [facility] [address] [facility] 
[phone] Jessica Connelly MD In 1 month For diabetes management  [facility] [address] [facility] 
[phone] AT HOME CARE On 2017 This will be the provider of your home health needs. If you do not hear from them within 24 hours post discharge, please give them a call. [address] [place] [address] 
[phone] Current Discharge Medication List  
  
START taking these medications Dose & Instructions Dispensing Information Comments Morning Noon Evening Bedtime acetaminophen 500 mg tablet Commonly known as:  TYLENOL Your last dose was: Your next dose is:    
   
   
 Dose:  1000 mg Take 2 Tabs by mouth every six (6) hours for 14 days. Quantity:  112 Tab Refills:  0  
     
   
   
   
  
 oxyCODONE IR 5 mg immediate release tablet Commonly known as:  Zaire Ambrosio Your last dose was: Your next dose is:    
   
   
 Dose:  5-10 mg Take 1-2 Tabs by mouth every three (3) hours as needed. Max Daily Amount: 80 mg.  
 Quantity:  80 Tab Refills:  0  
     
   
   
   
  
 polyethylene glycol 17 gram/dose powder Commonly known as:  Chino Aus Your last dose was: Your next dose is:    
   
   
 Dose:  17 g Take 17 g by mouth daily for 15 days. Quantity:  255 g Refills:  0  
     
   
   
   
  
 senna-docusate 8.6-50 mg per tablet Commonly known as:  SENNA PLUS Your last dose was: Your next dose is:    
   
   
 Dose:  1 Tab Take 1 Tab by mouth two (2) times a day. Quantity:  60 Tab Refills:  0 CONTINUE these medications which have CHANGED Dose & Instructions Dispensing Information Comments Morning Noon Evening Bedtime NexIUM 40 mg capsule Generic drug:  esomeprazole What changed:  See the new instructions. Your last dose was: Your next dose is: TAKE 1 CAPSULE DAILY Quantity:  90 Cap Refills:  1 CONTINUE these medications which have NOT CHANGED Dose & Instructions Dispensing Information Comments Morning Noon Evening Bedtime ASPIR-LOW 81 mg tablet Generic drug:  aspirin delayed-release Your last dose was: Your next dose is:    
   
   
 Dose:  81 mg Take 81 mg by mouth daily. Indications: CEREBRAL THROMBOEMBOLISM PREVENTION, PREVENTION OF TRANSIENT ISCHEMIC ATTACKS Refills:  0  
     
   
   
   
  
 gabapentin 800 mg tablet Commonly known as:  NEURONTIN  
   
 Your last dose was: Your next dose is:    
   
   
 Dose:  300 mg Take 300 mg by mouth three (3) times daily. Refills:  0  
     
   
   
   
  
 losartan 50 mg tablet Commonly known as:  COZAAR Your last dose was: Your next dose is:    
   
   
 Dose:  50 mg Take 1 Tab by mouth daily. Quantity:  90 Tab Refills:  3  
     
   
   
   
  
 simvastatin 20 mg tablet Commonly known as:  ZOCOR Your last dose was: Your next dose is: TAKE 1 TABLET NIGHTLY Quantity:  90 Tab Refills:  4  
     
   
   
   
  
 sodium chloride 5 % ophthalmic solution Commonly known as:  EMIR-5 Your last dose was: Your next dose is:    
   
   
 Dose:  1 Drop Administer 1 Drop to both eyes as needed. Refills:  0  
     
   
   
   
  
 timolol maleate 0.5 % Drpd ophthalmic solution Your last dose was: Your next dose is:    
   
   
 Dose:  1 Drop Administer 1 Drop to both eyes daily. Refills:  0  
     
   
   
   
  
 VITAMIN D3 2,000 unit Tab Generic drug:  cholecalciferol (vitamin D3) Your last dose was: Your next dose is:    
   
   
 Dose:  1 Tab Take 1 Tab by mouth daily. Refills:  0 STOP taking these medications ADVIL 200 mg tablet Generic drug:  ibuprofen  
   
  
 traMADol 50 mg tablet Commonly known as:  ULTRAM  
   
  
  
  
Where to Get Your Medications Information on where to get these meds will be given to you by the nurse or doctor. ! Ask your nurse or doctor about these medications  
  acetaminophen 500 mg tablet  
 oxyCODONE IR 5 mg immediate release tablet  
 polyethylene glycol 17 gram/dose powder  
 senna-docusate 8.6-50 mg per tablet Discharge Instructions 4 [facility] Discharge Instruction Sheet :   Microdiskectomy Arsh Lima. Lobo Zhang 
 
 Pain control: 
Typically, we will prescribe a narcotic usually 1-2 tabs every four hours is sufficient for the pain. Most patients need this only for the first few weeks. You should discontinue this as the pain decreases. You should not drive while taking any narcotic pain medications. Constipation Pain medicines and anesthesia can be constipating-this can be prevented by gentle physical activity and drinking plenty of fluid. It should be treated with over-the-counter medications such as Miralax or suppositories, and/or Fleets enema. You should have a bowel movement at least every other day following surgery. Incision care Keep this area clean and dry. Leave the current dressing in place for 7 days. You may shower with this dressing, but DO NOT take a tub bath or go swimming until cleared by your doctor. DO NOT apply lotions, oils, or creams to incision. After 7 days, remove this dressing and apply a new opsite (impermiable)  Dressing over the incision. This dressing may stay on for 7 more days (until your follow up visit with your surgeon). If staples are in place, they should be removed about 14-20 days after surgery. To increase and promote healing: 
? Stop Smoking (or at least cut back on smoking). ? Eat a well-balanced diet (high in protein and vitamin C) ? If your appetite is poor, consider nutritional supplements like Ensure, Glucerna, or Wolverine Instant Breakfast. 
? If you are diabetic, controlling you blood sugars is very important to prevent infection and promote wound healing. Nutrition: ? If you were on a supplement such as Ensure or Glucerna) while in the hospital, please continue using them with each meal for the next 30 days. ? Eat a well-balanced diet - High in protein, high in vitamins and minerals, especially vitamin C and zinc.  
 
Restrictions: 
Limited bending at waist 
Lift no more than 10 pounds Warning signs : Please call your physician immediately at [phone] if you have ? Bleeding from incision that is constant. ? Change in mental status (unusual behavior or confusion) ? If your incision develops redness or swelling 
? Change in wound drainage (increase in amount, color, or foul odor) ? [place] over 101.5 degrees Fahrenheit  
? Headache that is not relieved with pain medication ? Tenderness or redness in the calf of your leg Emergency: CALL 911 if you have ? Shortness of breath ? Chest pain ? Localized chest pain when coughing or taking a deep breath Follow-up Please call Dr. Jeremy Olivera office for a follow up appointment in 2 weeks at [phone]. You can return to work when cleared by a physician. During normal business hours you may reach Dr. Ananda Todd' team directly at [phone] if you have concerns or questions. Yumiko Chaves Discharge Orders None ACO Transitions of Care Introducing [employer] offers a voluntary care coordination program to provide high quality service and care to [facility] fee-for-service beneficiaries. Yoko Lantigua was designed to help you enhance your health and well-being through the following services: ? Transitions of Care  support for individuals who are transitioning from one care setting to another (example: Hospital to home). ? Chronic and Complex Care Coordination  support for individuals and caregivers of those with serious or chronic illnesses or with more than one chronic (ongoing) condition and those who take a number of different medications. If you meet specific medical criteria, a [facility] [address] may call you directly to coordinate your care with your primary care physician and your other care providers. For questions about the [facility] programs, please, contact your physicians office. For general questions or additional information about Accountable Care Organizations: 
Please visit www.medicare.gov/acos. html or call 1-800-MEDICARE (5-770.299.8036) TTY users should call 2-717.731.6777. [employer] Announcement We are excited to announce that we are making your provider's discharge notes available to you in [employer]. You will see these notes when they are completed and signed by the physician that discharged you from your recent hospital stay. If you have any questions or concerns about any information you see in [employer], please call the Health Information Department where you were seen or reach out to your Primary Care Provider for more information about your plan of care. Introducing [facility] SERVICES! Dear Reed Covert: Thank you for requesting a [employer] account. Our records indicate that you already have an active [employer] account. You can access your account anytime at [email or web address] Did you know that you can access your hospital and ER discharge instructions at any time in [employer]? You can also review all of your test results from your hospital stay or ER visit. Additional Information If you have questions, please visit the Frequently Asked Questions section of the [employer] website at [email or web address] Remember, [employer] is NOT to be used for urgent needs. For medical emergencies, dial 911. Now available from your iPhone and Android! General Information Please provide this summary of care documentation to your next provider. Patient Signature:  ____________________________________________________________ Date:  ____________________________________________________________  
  
Erik Wray Provider Signature:  ____________________________________________________________ Date:  ____________________________________________________________

## 2017-06-19 NOTE — H&P
Subjective:     Patient ID: Sandra Jean is a [de-identified] y.o. female. CHIEF COMPLAINT: Right lower extremity pain.      HPI: Harvey Bello is a pleasant, 52-year-old lady coming in today for follow-up of low back pain radiating down the right lower extremity. It's been going on now for about three weeks. She's recently had an MRI. She comes in today to discuss the results. The steroids had helped her pain significantly, however, as soon as the steroid stopped the pain recurred. It's rated at least as a 5/10. It's there constantly. It's worse with laying down, better with moving and walking. It's achy and throbbing in quality and there is no radiation to the left leg. Everything is on the right side. Patient Active Problem List    Diagnosis Date Noted    Hyperglycemia 10/07/2011    TIA (transient ischemic attack) 08/08/2011    Hyperlipidemia 08/08/2011    EKG abnormalities 08/08/2011    HTN (hypertension) 04/01/2011    Vertigo 04/01/2011    GERD (gastroesophageal reflux disease) 04/01/2011    Hearing loss in left ear 04/01/2011       Family History   Problem Relation Age of Onset    Cancer Father         Social History   Substance Use Topics    Smoking status: Former Smoker     Quit date: 1/1/1959    Smokeless tobacco: Never Used    Alcohol use 0.5 oz/week     1 Glasses of wine per week      Comment: socially       Past Medical History:   Diagnosis Date    Adverse effect of anesthesia 2005    took a long time to go to sleep with last colonoscopy    CAD (coronary artery disease) 2010    MI?  Chronic pain     hip rt    Gastrointestinal disorder     GERD    Glaucoma     Hypertension     Ill-defined condition     osteopenia    Ill-defined condition     pulled shoulder lt.  and elbow replacement        Past Surgical History:   Procedure Laterality Date    COLONOSCOPY N/A 9/9/2016    COL[facility]  9/9/2016         HX COLONOSCOPY      HX ORTHOPAEDIC      left arm surgery elbow replacement, rods in lt arm          Current Facility-Administered Medications:     lactated Ringers infusion, 25 mL/hr, IntraVENous, CONTINUOUS, Maru Romero MD    ceFAZolin (ANCEF) 2g in 100 ml 0.9% NS IVPB, 2 g, IntraVENous, ON CALL TO OR, Michael Good MD    acetaminophen (TYLENOL) tablet 1,000 mg, 1,000 mg, Oral, ONCE, Michael Good MD    pregabalin (LYRICA) capsule 150 mg, 150 mg, Oral, ONCE, Michael Good MD    lactated Ringers infusion, 125 mL/hr, IntraVENous, CONTINUOUS, Savanah Salinas MD    0.9% sodium chloride infusion, 25 mL/hr, IntraVENous, CONTINUOUS, Savanah Salinas MD    sodium chloride (NS) flush 5-10 mL, 5-10 mL, IntraVENous, Q8H, Savanah Salinas MD    sodium chloride (NS) flush 5-10 mL, 5-10 mL, IntraVENous, PRN, Savanah Salinas MD    lidocaine (PF) (XYLOCAINE) 10 mg/mL (1 %) injection 0.1 mL, 0.1 mL, SubCUTAneous, PRN, Savanah Salinas MD    fentaNYL citrate (PF) injection 50 mcg, 50 mcg, IntraVENous, PRN, Savanah Salinas MD    midazolam (VERSED) injection 1 mg, 1 mg, IntraVENous, PRN, Savanah Salinas MD    Allergies   Allergen Reactions    Fosamax [Alendronate] Nausea Only        ROS:   No new bowel or bladder incontinence. No fevers or chills. No saddle anesthesia. Objective: There were no vitals taken for this visit. There is no height or weight on file to calculate BMI., a BMI over 30 is considered obese and a BMI over 40 has been associated with a higher risk of surgical complications. Constitutional: No acute distress. Well nourished. HEENT: Normocephalic. Respiratory:  No labored breathing. Cardiovascular:  No marked cyanosis. Skin:  No marked skin ulcers/lesions on bilateral upper or lower extremities. Psychiatric: Alert and oriented x3. Inspection: No gross deformity of bilateral upper or lower extremities.   Musculoskeletal/Neurological:   Thoracolumbar spine:  - No tenderness to palpation.  - Full range of motion. Right lower extremity:  - No tenderness to palpation  - Full range of motion  - No pain with internal/external rotation of the hip  - Strength:  - 5 out of 5 to hip flexors  - 5 out of 5 to knee extensors  - 3 out of 5 to ankle dorsiflexors  - 3 out of 5 to great toe extensors  - 5 out of 5 to ankle plantar flexors  - Negative straight leg raise  Left lower extremity:  - No tenderness to palpation  - Full range of motion  - No pain with internal/external rotation of the hip  - Strength:  - 5 out of 5 to hip flexors  - 5 out of 5 to knee extensors  - 5 out of 5 to ankle dorsiflexors  - 5 out of 5 to great toe extensors  - 5 out of 5 to ankle plantar flexors  - Negative straight leg raise  Sensation:  - Decreased on the right leg  Reflexes:  - +2 Patellar tendon   - +2 Achilles tendon. Radiographs:       MRI of the lumbar spine done at [facility] shows she has degenerative changes throughout, but she has a herniated disk at L4/5 on the right side concordant with her symptoms. Assessment:     No diagnosis found.         Plan:     I have discussed the procedure in detail with Daphne Musa and mentioned complications, including but not limited to: death, permanent disability, heart attack, stroke, lung injury or infection, blindness, ileus, bladder or bowel problems, ureter injury, bleeding, nerve injury (including numbness, pain and weakness), paralysis (which may be permanent), failure to heal, failure to fuse bone together in fusion procedures, failure to relief symptoms, failure to relief pain, increased pain, need for further surgeries, failure or breakage or hardware, malpositioning of hardware, need to fuse or operate on additional levels determined either during or after surgery, destabilization of the spine (which may require fusion or later surgery), infections (which may or may not require additional surgery), dural tears (tears of the sac holding in nerves and spinal fluid), meningitis, voice changes, vocal cord injury, hoarseness, blood clots, pulmonary embolus, Yasmin syndrome, recurrent disc herniation, diaphragm paralysis, and anesthetic complications. Comorbidities such as obesity, smoking, rheumatoid arthritis, chronic steroid use and diabetes increase these risks. Brain Lisakaylas understands and wants to proceed.      R L4-5 discectomy            Nisa Corral MD

## 2017-06-19 NOTE — ANESTHESIA POSTPROCEDURE EVALUATION
Post-Anesthesia Evaluation and Assessment    Patient: Carmela Willis MRN: [id number]  SSN: [id number]    YOB: 1935  Age: [de-identified] y.o. Sex: female       Cardiovascular Function/Vital Signs  Visit Vitals    /53 (BP 1 Location: Left arm, BP Patient Position: At rest)    Pulse 79    Temp 36.9 °C (98.4 °F)    Resp 11    Ht 5' 6.5\" (1.689 m)    Wt 86.3 kg (190 lb 4.1 oz)    SpO2 99%    BMI 30.25 kg/m2       Patient is status post general anesthesia for Procedure(s):  L4-5 DISCECTOMY. Nausea/Vomiting: None    Postoperative hydration reviewed and adequate. Pain:  Pain Scale 1: Numeric (0 - 10) (06/19/17 1830)  Pain Intensity 1: 0 (06/19/17 1830)   Managed    Neurological Status:   Neuro (WDL): Within Defined Limits (06/19/17 1757)  Neuro  Neurologic State: Alert;Drowsy (06/19/17 1757)  Orientation Level: Oriented to person;Oriented to place;Oriented to situation (06/19/17 1757)  Cognition: Appropriate for age attention/concentration; Follows commands (06/19/17 1757)  Speech: Clear (06/19/17 1757)  LUE Motor Response: Purposeful;Spontaneous  (06/19/17 1757)  LLE Motor Response: Purposeful;Spontaneous  (06/19/17 1757)  RUE Motor Response: Purposeful;Spontaneous  (06/19/17 1757)  RLE Motor Response: Purposeful;Spontaneous  (06/19/17 1757)   At baseline    Mental Status and Level of Consciousness: Arousable    Pulmonary Status:   O2 Device: Nasal cannula (06/19/17 1830)   Adequate oxygenation and airway patent    Complications related to anesthesia: None    Post-anesthesia assessment completed.  No concerns    Signed By: Broderick Mendez MD     June 19, 2017

## 2017-06-19 NOTE — PROGRESS NOTES
Ortho/ NeuroSurgery NP Note    POD# 0  s/p L4-5 DISCECTOMY   Patient was still in the OR and therefore was not seen. This note serves as a record of a chart review. Most Recent Labs:   Lab Results   Component Value Date/Time    HGB 13.4 06/06/2017 02:10 PM    INR 1.0 06/06/2017 02:10 PM    Hemoglobin A1c 6.6 06/06/2017 02:10 PM     MRSA Screen Pre-op Negative  U/A Screen Pre-Op Positive Streptococcus Bovis. Treated with Levaquin and surgery initially delayed. Body mass index is 30.25 kg/(m^2). BMI greater than 30 is classified as obesity. Social History: No significant history. Plan:  1) PT BID starting tomorrow. 2) Ebony-op Antibiotics Ancef  3) New diagnosis diabetes?- Pre-op A1C diagnostic for DM Type 2 but no evidence of this diagnosis in medical record. Will consult DTC and discuss further with patient tomorrow. Patient's PCP has been treating Pre-Diabetes and patient also on steroids. 4) Discharge planning will begin on POD #1.      Kami Clay NP

## 2017-06-20 VITALS
DIASTOLIC BLOOD PRESSURE: 54 MMHG | BODY MASS INDEX: 29.86 KG/M2 | OXYGEN SATURATION: 94 % | TEMPERATURE: 97.9 F | WEIGHT: 190.26 LBS | HEART RATE: 75 BPM | HEIGHT: 67 IN | RESPIRATION RATE: 16 BRPM | SYSTOLIC BLOOD PRESSURE: 125 MMHG

## 2017-06-20 PROCEDURE — G8988 SELF CARE GOAL STATUS: HCPCS | Performed by: OCCUPATIONAL THERAPIST

## 2017-06-20 PROCEDURE — 74011250637 HC RX REV CODE- 250/637: Performed by: ORTHOPAEDIC SURGERY

## 2017-06-20 PROCEDURE — 99218 HC RM OBSERVATION: CPT

## 2017-06-20 PROCEDURE — G8978 MOBILITY CURRENT STATUS: HCPCS

## 2017-06-20 PROCEDURE — 59 HC PT GAIT TRAINING 15 MIN

## 2017-06-20 PROCEDURE — G8978 MOBILITY CURRENT STATUS: HCPCS | Performed by: OCCUPATIONAL THERAPIST

## 2017-06-20 PROCEDURE — 97530 THERAPEUTIC ACTIVITIES: CPT

## 2017-06-20 PROCEDURE — 97116 GAIT TRAINING THERAPY: CPT

## 2017-06-20 PROCEDURE — 77010033678 HC OXYGEN DAILY

## 2017-06-20 PROCEDURE — 97162 PT EVAL MOD COMPLEX 30 MIN: CPT

## 2017-06-20 PROCEDURE — 97530 THERAPEUTIC ACTIVITIES: CPT | Performed by: OCCUPATIONAL THERAPIST

## 2017-06-20 PROCEDURE — 74011000250 HC RX REV CODE- 250: Performed by: ORTHOPAEDIC SURGERY

## 2017-06-20 PROCEDURE — G8979 MOBILITY GOAL STATUS: HCPCS

## 2017-06-20 PROCEDURE — 97165 OT EVAL LOW COMPLEX 30 MIN: CPT | Performed by: OCCUPATIONAL THERAPIST

## 2017-06-20 PROCEDURE — 74011250636 HC RX REV CODE- 250/636: Performed by: ORTHOPAEDIC SURGERY

## 2017-06-20 PROCEDURE — 97535 SELF CARE MNGMENT TRAINING: CPT | Performed by: OCCUPATIONAL THERAPIST

## 2017-06-20 PROCEDURE — 51798 US URINE CAPACITY MEASURE: CPT

## 2017-06-20 PROCEDURE — G8987 SELF CARE CURRENT STATUS: HCPCS | Performed by: OCCUPATIONAL THERAPIST

## 2017-06-20 PROCEDURE — G8989 SELF CARE D/C STATUS: HCPCS | Performed by: OCCUPATIONAL THERAPIST

## 2017-06-20 RX ORDER — AMOXICILLIN 250 MG
1 CAPSULE ORAL 2 TIMES DAILY
Qty: 60 TAB | Refills: 0 | Status: SHIPPED | OUTPATIENT
Start: 2017-06-20 | End: 2017-09-15 | Stop reason: ALTCHOICE

## 2017-06-20 RX ORDER — OXYCODONE HYDROCHLORIDE 5 MG/1
5-10 TABLET ORAL
Qty: 80 TAB | Refills: 0 | Status: SHIPPED | OUTPATIENT
Start: 2017-06-20 | End: 2017-09-15 | Stop reason: ALTCHOICE

## 2017-06-20 RX ORDER — POLYETHYLENE GLYCOL 3350 17 G/17G
17 POWDER, FOR SOLUTION ORAL DAILY
Qty: 255 G | Refills: 0 | Status: SHIPPED | OUTPATIENT
Start: 2017-06-20 | End: 2017-07-05

## 2017-06-20 RX ORDER — ACETAMINOPHEN 500 MG
1000 TABLET ORAL EVERY 6 HOURS
Qty: 112 TAB | Refills: 0 | Status: SHIPPED | OUTPATIENT
Start: 2017-06-20 | End: 2017-07-04

## 2017-06-20 RX ADMIN — CHOLECALCIFEROL TAB 25 MCG (1000 UNIT) 2000 UNITS: 25 TAB at 09:58

## 2017-06-20 RX ADMIN — VALSARTAN 80 MG: 80 TABLET ORAL at 09:58

## 2017-06-20 RX ADMIN — ACETAMINOPHEN 1000 MG: 500 TABLET ORAL at 15:10

## 2017-06-20 RX ADMIN — KETOROLAC TROMETHAMINE 15 MG: 30 INJECTION, SOLUTION INTRAMUSCULAR at 07:04

## 2017-06-20 RX ADMIN — Medication 10 ML: at 07:06

## 2017-06-20 RX ADMIN — KETOROLAC TROMETHAMINE 15 MG: 30 INJECTION, SOLUTION INTRAMUSCULAR at 15:10

## 2017-06-20 RX ADMIN — POLYETHYLENE GLYCOL 3350 17 G: 17 POWDER, FOR SOLUTION ORAL at 09:57

## 2017-06-20 RX ADMIN — GABAPENTIN 300 MG: 300 CAPSULE ORAL at 15:10

## 2017-06-20 RX ADMIN — CEFAZOLIN 2 G: 10 INJECTION, POWDER, FOR SOLUTION INTRAVENOUS; PARENTERAL at 09:56

## 2017-06-20 RX ADMIN — ACETAMINOPHEN 1000 MG: 500 TABLET ORAL at 07:03

## 2017-06-20 RX ADMIN — CEFAZOLIN 2 G: 10 INJECTION, POWDER, FOR SOLUTION INTRAVENOUS; PARENTERAL at 01:21

## 2017-06-20 RX ADMIN — OXYCODONE HYDROCHLORIDE 5 MG: 5 TABLET ORAL at 10:00

## 2017-06-20 RX ADMIN — Medication 10 ML: at 15:11

## 2017-06-20 RX ADMIN — GABAPENTIN 300 MG: 300 CAPSULE ORAL at 09:58

## 2017-06-20 RX ADMIN — ASPIRIN 81 MG: 81 TABLET, COATED ORAL at 09:58

## 2017-06-20 RX ADMIN — OXYCODONE HYDROCHLORIDE 5 MG: 5 TABLET ORAL at 15:10

## 2017-06-20 RX ADMIN — TIMOLOL MALEATE 1 DROP: 5 SOLUTION/ DROPS OPHTHALMIC at 09:56

## 2017-06-20 RX ADMIN — SENNOSIDES AND DOCUSATE SODIUM 1 TABLET: 8.6; 5 TABLET ORAL at 09:58

## 2017-06-20 RX ADMIN — OXYCODONE HYDROCHLORIDE 5 MG: 5 TABLET ORAL at 07:03

## 2017-06-20 NOTE — OP NOTES
[address], [address] [place] [address] Ave   OP NOTE       Name:  Brianna Shepard   MR#:  [id number]   :  1935   Account #:  [de-identified]    Surgery Date:  2017   Date of Adm:  2017       PREOPERATIVE DIAGNOSES   1. Disk herniation, L4-L5 on the right. 2. Right lower extremity sciatica. 3. Lumbago. POSTOPERATIVE DIAGNOSES   1. Disk herniation, L4-L5 on the right. 2. Right lower extremity sciatica. 3. Lumbago. PROCEDURES PERFORMED   1. L4-L5 right-sided microdiskectomy. 2. Use of operative microscope. SURGEON: Chelsea Reyes MD    FIRST ASSISTANT: Yair Guillen    ESTIMATED BLOOD LOSS: 25 mL. COMPLICATIONS: None. SPECIMENS REMOVED: None. ANESTHESIA: General.    DRAINS: x1. IMPLANTS: None. INDICATIONS FOR PROCEDURE: The patient is a pleasant 81-year-  old lady with a L4-5 disk herniation, resulting in lumbago and sciatica   that has failed to improve with nonoperative treatment. At this point,   she would like to proceed with surgical intervention. We have given her   warnings about possible complications, including but not limited to   pain, scar, bleeding, infection, nonunion, damage to surrounding   structures, death, paralysis, blindness, stroke. She understands and   wants to proceed. DESCRIPTION OF PROCEDURE: After informed consent was   obtained and the operative site was properly marked, the patient was   moved back in the operating room and underwent general   endotracheal anesthesia. She was positioned prone on the operating   room table using the Juan table, 4-poster frame. Her arms were   placed in the 90/90 position and knees were gently bent with pillows. Fluoroscopy was used to carlyle the level of the incision and then   proceeded to prep and drape in the usual manner.  Time-out was   obtained, verifying that this was the correct patient, the correct surgery,   the correct site, as well as that she had received IV antibiotics within   30 minutes of the incision. We then proceeded to perform a standard posterior approach to the   lumbar spine, exposing the area between L4 and L5 on the right side. Once that area was exposed and hemostasis was obtained,   fluoroscopy was used to verify that we were in the correct level. At that   point, I proceeded then to bring in the operative microscope and keep   it in place for the remainder of the procedure. I proceeded to use a   Midas Howie to perform a U cut laminectomy and a J-cut at L4 and a J-  cut at L5. Intervening bone was removed with a pituitary and the   ligamentum flavum was detached from the superior leading edge with   a curved curette. I then proceeded to remove it with a Kerrison #3,   followed by Kerrison #4, in the process, exposing the thecal sac. Once   that area was totally exposed, I proceeded then to irrigate the wound   with 3 L of normal saline, verified that hemostasis was obtained,   inspecting the neural structures, verifying that they were fully   decompressed. Prior to this, I proceeded to use a 15 blade to perform   a linear annulotomy on the annulus and removed the disk herniation   with a micropituitary. I irrigated the disk with normal saline until no free   fragments were found. Once this was all completed, I proceeded then   to close the fascia with #1 Vicryl figure-of-eight interrupted sutures. We   irrigated subcutaneous, closed subcutaneous with 2-0 Vicryl and the   skin with a 3-0 running Monocryl and staples. Sterile dressing was   applied. The patient was then awakened and transferred to the PACU   in stable condition. POSTOPERATIVE PLAN: The patient is going to remain here   overnight. We are going to give SCDs and YARITZA hose for DVT   prophylaxis and Ancef for infection prophylaxis. If she is doing well   tomorrow, we will let her go home.         MD CHANDNI Suazo / MAYE   D:  06/19/2017   17:37   T:  06/19/2017 23:41   Job #:  [id number]

## 2017-06-20 NOTE — DISCHARGE INSTRUCTIONS
[address]    Discharge Instruction Sheet :   Microdiskectomy    DR. Alex Hatch    Pain control:  Typically, we will prescribe a narcotic usually 1-2 tabs every four hours is sufficient for the pain. Most patients need this only for the first few weeks. You should discontinue this as the pain decreases. You should not drive while taking any narcotic pain medications. Constipation  Pain medicines and anesthesia can be constipating-this can be prevented by gentle physical activity and drinking plenty of fluid. It should be treated with over-the-counter medications such as Miralax or suppositories, and/or Fleets enema. You should have a bowel movement at least every other day following surgery. Incision care    Keep this area clean and dry. Leave the current dressing in place for 7 days. You may shower with this dressing, but DO NOT take a tub bath or go swimming until cleared by your doctor. DO NOT apply lotions, oils, or creams to incision. After 7 days, remove this dressing and apply a new opsite (impermiable)  Dressing over the incision. This dressing may stay on for 7 more days (until your follow up visit with your surgeon). If staples are in place, they should be removed about 14-20 days after surgery. To increase and promote healing:   Stop Smoking (or at least cut back on smoking).  Eat a well-balanced diet (high in protein and vitamin C)   If your appetite is poor, consider nutritional supplements like Ensure, Glucerna, or [place] Instant Breakfast.   If you are diabetic, controlling you blood sugars is very important to prevent infection and promote wound healing. Nutrition:   If you were on a supplement such as Ensure or Glucerna) while in the hospital, please continue using them with each meal for the next 30 days.    Eat a well-balanced diet - High in protein, high in vitamins and minerals, especially vitamin C and zinc.     Restrictions:  Limited bending at waist  Lift no more than 10 pounds    Warning signs : Please call your physician immediately at [phone] if you have   Bleeding from incision that is constant.  Change in mental status (unusual behavior or confusion)   If your incision develops redness or swelling   Change in wound drainage (increase in amount, color, or foul odor)   [place] over 101.5 degrees Fahrenheit    Headache that is not relieved with pain medication   Tenderness or redness in the calf of your leg    Emergency: CALL 911 if you have   Shortness of breath   Chest pain   Localized chest pain when coughing or taking a deep breath    Follow-up  Please call Dr. Ashlie Delvalle office for a follow up appointment in 2 weeks at [phone]. You can return to work when cleared by a physician. During normal business hours you may reach Dr. Eather Leyden' team directly at [phone] if you have concerns or questions.       Topher Robert

## 2017-06-20 NOTE — DISCHARGE SUMMARY
Spine Discharge Summary    Patient ID:  Yumiko Chaves  [id number]  female  [de-identified] y.o.  1935    Admit date: 6/19/2017    Discharge date: 6/20/2017    Admitting Physician: Jeanna Oneal MD     Consulting Physician(s):   Treatment Team: Attending Provider: Jeanna Oneal MD; Utilization Review: Jannette Floyd    Date of Surgery:   6/19/2017     Preoperative Diagnosis:  LUMBAGO, RADICULOPATHY, HNP    Postoperative Diagnosis:   LUMBAGO, RADICULOPATHY, HNP L4-5    Procedure(s):  L4-5 DISCECTOMY     Anesthesia Type:   General     Surgeon: Jeanna Oneal MD                            HPI:  Pt is a [de-identified] y.o. female who has a history of LUMBAGO, RADICULOPATHY, HNP  with pain and limitations of activities of daily living who presents at this time for a L4L5 Discectomy following the failure of conservative management. PMH:   Past Medical History:   Diagnosis Date    Adverse effect of anesthesia 2005    took a long time to go to sleep with last colonoscopy    CAD (coronary artery disease) 2010    MI?  Chronic pain     hip rt    Gastrointestinal disorder     GERD    Glaucoma     Hypertension     Ill-defined condition     osteopenia    Ill-defined condition     pulled shoulder lt. and elbow replacement       Body mass index is 30.25 kg/(m^2). BMI greater than 30 is classified as obesity. Medications upon admission :   Prior to Admission Medications   Prescriptions Last Dose Informant Patient Reported? Taking? NEXIUM 40 mg capsule 6/19/2017 at 0830  No Yes   Sig: TAKE 1 CAPSULE DAILY   Patient taking differently: TAKE 1 CAPSULE PRN   aspirin delayed-release (ASPIR-LOW) 81 mg tablet 6/12/2017 at Unknown time  Yes Yes   Sig: Take 81 mg by mouth daily. Indications: CEREBRAL THROMBOEMBOLISM PREVENTION, PREVENTION OF TRANSIENT ISCHEMIC ATTACKS   cholecalciferol, vitamin D3, (VITAMIN D3) 2,000 unit tab 6/18/2017 at Unknown time  Yes Yes   Sig: Take 1 Tab by mouth daily.    gabapentin (NEURONTIN) 800 mg tablet 6/19/2017 at 0830  Yes Yes   Sig: Take 300 mg by mouth three (3) times daily. ibuprofen (ADVIL) 200 mg tablet 6/12/2017 at Unknown time  Yes Yes   Sig: Take 800 mg by mouth every six (6) hours as needed. losartan (COZAAR) 50 mg tablet 6/18/2017 at Unknown time  No Yes   Sig: Take 1 Tab by mouth daily. simvastatin (ZOCOR) 20 mg tablet 5/19/2017 at Unknown time  No Yes   Sig: TAKE 1 TABLET NIGHTLY   sodium chloride (EMIR-5) 5 % ophthalmic solution 6/18/2017 at Unknown time  Yes Yes   Sig: Administer 1 Drop to both eyes as needed. timolol maleate 0.5 % DrpD ophthalmic solution 6/18/2017 at Unknown time  Yes Yes   Sig: Administer 1 Drop to both eyes daily. traMADol (ULTRAM) 50 mg tablet 6/17/2017  Yes Yes   Sig: Take 50 mg by mouth every eight (8) hours as needed for Pain. Facility-Administered Medications: None        Allergies: Allergies   Allergen Reactions    Fosamax [Alendronate] Nausea Only        Hospital Course: The patient underwent surgery. Complications:  None; patient tolerated the procedure well. Was taken to the PACU in stable condition and then transferred to the ortho floor. Perioperative Antibiotics:  Ancef     Postoperative Pain Management:  Oxycodone     Postoperative transfusions:    Number of units banked PRBCs =   none     Post Op complications: none    Hemoglobin at discharge:    Lab Results   Component Value Date/Time    HGB 13.4 06/06/2017 02:10 PM    INR 1.0 06/06/2017 02:10 PM       Dressing - clean, dry and intact. No significant erythema or swelling. Neurovascular exam found to be within normal limits. Wound appears to be healing without any evidence of infection. Physical Therapy started on the day following surgery and participated in bed mobility, transfers and ambulation. Discharged to: Home.     Condition on Discharge:   stable    Discharge instructions:    - Take pain medications as prescribed  - Resume pre hospital diet      - Discharge activity: activity as tolerated  - Ambulate as tolerated  - Wound Care Keep wound clean and dry. See discharge instruction sheet.  - Staples, if present, to be removed 10-14 days after surgery            -DISCHARGE MEDICATION LIST     Current Discharge Medication List      START taking these medications    Details   acetaminophen (TYLENOL) 500 mg tablet Take 2 Tabs by mouth every six (6) hours for 14 days. Qty: 112 Tab, Refills: 0      oxyCODONE IR (ROXICODONE) 5 mg immediate release tablet Take 1-2 Tabs by mouth every three (3) hours as needed. Max Daily Amount: 80 mg.  Qty: 80 Tab, Refills: 0      senna-docusate (SENNA PLUS) 8.6-50 mg per tablet Take 1 Tab by mouth two (2) times a day. Qty: 60 Tab, Refills: 0      polyethylene glycol (MIRALAX) 17 gram/dose powder Take 17 g by mouth daily for 15 days. Qty: 255 g, Refills: 0         CONTINUE these medications which have NOT CHANGED    Details   cholecalciferol, vitamin D3, (VITAMIN D3) 2,000 unit tab Take 1 Tab by mouth daily. sodium chloride (EMIR-5) 5 % ophthalmic solution Administer 1 Drop to both eyes as needed. gabapentin (NEURONTIN) 800 mg tablet Take 300 mg by mouth three (3) times daily. NEXIUM 40 mg capsule TAKE 1 CAPSULE DAILY  Qty: 90 Cap, Refills: 1      losartan (COZAAR) 50 mg tablet Take 1 Tab by mouth daily. Qty: 90 Tab, Refills: 3      simvastatin (ZOCOR) 20 mg tablet TAKE 1 TABLET NIGHTLY  Qty: 90 Tab, Refills: 4      timolol maleate 0.5 % DrpD ophthalmic solution Administer 1 Drop to both eyes daily. aspirin delayed-release (ASPIR-LOW) 81 mg tablet Take 81 mg by mouth daily.  Indications: CEREBRAL THROMBOEMBOLISM PREVENTION, PREVENTION OF TRANSIENT ISCHEMIC ATTACKS         STOP taking these medications       traMADol (ULTRAM) 50 mg tablet Comments:   Reason for Stopping:         ibuprofen (ADVIL) 200 mg tablet Comments:   Reason for Stopping:            per medical continuation form      -Follow up in office in 1 week      Signed:  Syeda Banks  MSN, APRN, NP-C, [facility]  Orthopaedic Nurse Practitioner    6/20/2017  9:35 AM

## 2017-06-20 NOTE — DIABETES MGMT
DTC Consult Note    Recommendations/ Comments:   Met with pt and son for consult. Pt reported that her PCP has been following her A1C values and she was aware the she was pre-dm prior to this A1C value. Discussed with pt impact pain and steroid usage can have on BG control. Provided pt with Krista Contour Next EZ meter, pt was able to return demonstration of SMBG with minimal assistance. Pt BG at time of visit 114mg/dL. Encouraged pt to check BG 1x/day - rotating times. Discussed importance of good BG control for wound healing and reducing infection risks. Reviewed meal planning and using plate method as guide when putting meals together. Provided education materials and DTC contact info. Consult received for:  []             Assessment of home management                []      Medication Recommendations                []             Meter/monitoring     []             Insulin instruction     [x]             New diagnosis     []             Outpatient education     []             Insulin pump patient     []             Insulin infusion     []             DKA/HHS    Chart reviewed and initial evaluation complete on Brie Stewart. Patient is a [de-identified] y.o. female with newly diagnosed Type 2 Diabetes on none at home.       Assessed and instructed patient on the following:   ·  interpretation of lab results, blood sugar goals, hypoglycemia prevention and treatment, exercise, illness management, SMBG skills, nutrition and referred to Diabetes Educator    Encouraged the following:   · increased exercise - when cleared by MD post-op  · dietary modifications: well balanced meals, reduce carb portion sizes  · regular blood sugar monitorintimes daily    Provided patient with the following: [x]             Survival skills education materials               []             Insulin education materials               []             CHO counting education materials               [x]             Outpatient DTC contact number               [x]             Glucometer               Patient was able to give return demonstration of    [x]       Glucometer    []       saline injection      with []     without []       assistance needed. []       Nurse to have patient self inject prior to discharge. Discussed with patient and/or family need for follow up appointment for diabetes management after discharge. A1c:   Lab Results   Component Value Date/Time    Hemoglobin A1c 6.6 06/06/2017 02:10 PM       Recent Glucose Results: No results found for: GLU, GLUPOC, GLUCPOC     Lab Results   Component Value Date/Time    Creatinine 0.74 06/06/2017 02:10 PM     Estimated Creatinine Clearance: 67.8 mL/min (based on Cr of 0.74). Active Orders   Diet    DIET REGULAR        PO intake: No data found. Current hospital DM medication: none    Will continue to follow as needed. Thank you.     Luis Moran, [id number] [facility]  Office: [phone]

## 2017-06-20 NOTE — PROGRESS NOTES
Problem: Mobility Impaired (Adult and Pediatric)  Goal: *Acute Goals and Plan of Care (Insert Text)  Physical Therapy Goals  Initiated 6/20/2017    1. Patient will move from supine to sit and sit to supine , scoot up and down and roll side to side in bed with independence within 4 days. 2. Patient will perform sit to stand with independence within 4 days. 3. Patient will ambulate with modified independence for 150 feet with the least restrictive device within 4 days. 4. Patient will ascend/descend 14 stairs with 1 handrail(s) with modified independence within 4 days. 5. Patient will verbalize and demonstrate understanding of spinal precautions (No bending, lifting greater than 5 lbs, or twisting; log-roll technique; frequent repositioning as instructed) within 4 days. PHYSICAL THERAPY TREATMENT  Patient: Kalee Gao (4 y.o. female)  Date: 6/20/2017  Diagnosis: LUMBAGO, RADICULOPATHY, HNP  LUMBAGO, RADICULOPATHY, HNP <principal problem not specified>  Procedure(s) (LRB):  L4-5 DISCECTOMY (Bilateral) 1 Day Post-Op  Precautions: Fall, Back      ASSESSMENT:  Pt received supine in bed with son present and agreeable to PT intervention. Pt cleared by nursing for mobility. Pt without pain complaints during mobility and with overall improved mobility this afternoon. Pt performed bed mobility mod I, requiring min VCs for proper log roll technique. Pt able to bijan brace with min A for placement. Pt recalled 3/3 spinal precautions, however continues to require occasional VCs for adherence (pt with difficulty remembering not to twist). Pt performed transfers with supervision. Pt able to ambulate 315' with SBA and using RW. Pt demonstrated overall steady gait with use of RW. Pt ascended/descended 16 steps with SBA using R railing and SPC on L. Pt required near constant cueing for proper sequencing on steps with use of SPC. Son present for all mobility and will be staying with patient at discharge.  Pt was assisted into bedside chair following therapy session and left with all needs met, son present, and RN aware. Recommend patient discharge home with HHPT, use of RW, and initial 24/7 supervision. Pt is cleared for discharge by PT. Progression toward goals:  [X]    Improving appropriately and progressing toward goals  [ ]    Improving slowly and progressing toward goals  [ ]    Not making progress toward goals and plan of care will be adjusted       PLAN:  Patient continues to benefit from skilled intervention to address the above impairments. Continue treatment per established plan of care. Discharge Recommendations:  Home Health with initial 24/7 support  Further Equipment Recommendations for Discharge:  None; pt owns equipment       SUBJECTIVE:   Patient stated I need to work on that.  referring to not twisting      OBJECTIVE DATA SUMMARY:   Critical Behavior:  Neurologic State: Alert  Orientation Level: Oriented X4  Cognition: Decreased attention/concentration, Follows commands  Safety/Judgement: Decreased awareness of need for safety, Insight into deficits  Functional Mobility Training:  Bed Mobility:  Rolling: Modified independent  Supine to Sit: Modified independent (VCs)  Scooting: Independent        Transfers:  Sit to Stand: Supervision  Stand to Sit: Supervision        Bed to Chair: Supervision                    Balance:  Sitting: Intact  Standing: Impaired; Without support  Standing - Static: Good  Standing - Dynamic : Fair  Ambulation/Gait Training:  Distance (ft): 315 Feet (ft)  Assistive Device: Gait belt;Walker, rolling  Ambulation - Level of Assistance: Stand-by asssistance     Gait Description (WDL): Exceptions to WDL  Gait Abnormalities: Decreased step clearance        Base of Support: Widened     Speed/Venita: Pace decreased (<100 feet/min)  Step Length: Left shortened;Right shortened     Stairs:  Number of Stairs Trained: 16  Stairs - Level of Assistance: Stand-by Automatic Data Use: Right  (SPC on L)  Pain:  Pain Scale 1: Numeric (0 - 10)  Pain Intensity 1: 0              Activity Tolerance:   Improving - increased gait distance; no pain  Please refer to the flowsheet for vital signs taken during this treatment.   After treatment:   [X]    Patient left in no apparent distress sitting up in chair  [ ]    Patient left in no apparent distress in bed  [X]    Call bell left within reach  [X]    Nursing notified  [X]    Caregiver present  [ ]    Bed alarm activated      COMMUNICATION/COLLABORATION:   The patients plan of care was discussed with: Physical Therapist and Registered Nurse     Pavel Hernandez PT, DPT   Time Calculation: 29 mins

## 2017-06-20 NOTE — PROGRESS NOTES
Ortho/ NeuroSurgery NP Note    POD# 1 s/p L4-5 DISCECTOMY   Pt seen by Lisa Baeza this morning. Pt OOB to the chair. No complaints. Denies pain. Pre-op had pain in the R. Leg with nerve pain in the right foot that felt like \"sand paper\"   VSS Afebrile. Cordero removed. Labs  Lab Results   Component Value Date/Time    HGB 13.4 06/06/2017 02:10 PM        Body mass index is 30.25 kg/(m^2). BMI greater than 30 is classified as obesity and greater than 40 is classified as morbid obesity. Dressing c.d.i. Calves soft and supple; No pain with passive stretch  Sensation and motor intact  SCDs for mechanical DVT proph while in bed     PLAN:  1) PT BID Will work on stairs this afternoon. 2) Plan d/c to home her son.     Claire Medel, KRISTY

## 2017-06-20 NOTE — PROGRESS NOTES
1511 Discharge instructions discussed with with the patient and her son. IV removed. Son picked up her prescriptions. [facility] requested. Patient discharged to front entrance.

## 2017-06-20 NOTE — PROGRESS NOTES
Problem: Mobility Impaired (Adult and Pediatric)  Goal: *Acute Goals and Plan of Care (Insert Text)  Physical Therapy Goals  Initiated 6/20/2017    1. Patient will move from supine to sit and sit to supine , scoot up and down and roll side to side in bed with independence within 4 days. 2. Patient will perform sit to stand with independence within 4 days. 3. Patient will ambulate with modified independence for 150 feet with the least restrictive device within 4 days. 4. Patient will ascend/descend 14 stairs with 1 handrail(s) with modified independence within 4 days. 5. Patient will verbalize and demonstrate understanding of spinal precautions (No bending, lifting greater than 5 lbs, or twisting; log-roll technique; frequent repositioning as instructed) within 4 days. PHYSICAL THERAPY EVALUATION  Patient: Marjorie Forman (48 y.o. female)  Date: 6/20/2017  Primary Diagnosis: LUMBAGO, RADICULOPATHY, HNP  LUMBAGO, RADICULOPATHY, HNP  Procedure(s) (LRB):  L4-5 DISCECTOMY (Bilateral) 1 Day Post-Op   Precautions:  Fall, Back      ASSESSMENT :  Based on the objective data described below, the patient presents with impaired functional mobility secondary to impaired standing balance, generalized weakness, spinal precautions, impaired gait, and decreased AROM POD#1 L4-5 discectomy. Pt is independent to mod I for mobility at baseline and lives alone in three level home. Pt received standing in the bathroom alone without brace on. Son present in room. Pt educated on use of brace, spinal precautions, and log roll technique. Pt performed transfers and bed mobility with CGA. Pt utilizing RW in room, however was using SPC PTA. Pt able to bijna brace with min-CGA. Pt educated on safe use of RW during transfers and gait. Pt ambulated 220' total with CGA, using RW for initial [de-identified]' and HHA x 1 for last 140' (to simulate [facility]). Pt demonstrated mildly unsteady gait with HHA and RW recommended for home at this time.  Pt required minimal VCs during mobility for adherence to spinal precautions. Pt was assisted into bedside chair following therapy session and left with all needs met, RN aware, and son present. Pt without pain complaints during mobility. Recommend patient discharge home with 24/7 family support, use of RW, and HHPT. PT planning to follow up with pt in the afternoon for stair training. Patient will benefit from skilled intervention to address the above impairments. Patients rehabilitation potential is considered to be Good  Factors which may influence rehabilitation potential include:   [X]         None noted  [ ]         Mental ability/status  [ ]         Medical condition  [ ]         Home/family situation and support systems  [ ]         Safety awareness  [ ]         Pain tolerance/management  [ ]         Other:        PLAN :  Recommendations and Planned Interventions:  [X]           Bed Mobility Training             [ ]    Neuromuscular Re-Education  [X]           Transfer Training                   [ ]    Orthotic/Prosthetic Training  [X]           Gait Training                         [ ]    Modalities  [X]           Therapeutic Exercises           [ ]    Edema Management/Control  [X]           Therapeutic Activities            [X]    Patient and Family Training/Education  [ ]           Other (comment):     Frequency/Duration: Patient will be followed by physical therapy  twice daily to address goals. Discharge Recommendations: Home Health  Further Equipment Recommendations for Discharge: None       SUBJECTIVE:   Patient stated I need to get better at not twisting.       OBJECTIVE DATA SUMMARY:   HISTORY:    Past Medical History:   Diagnosis Date    Adverse effect of anesthesia 2005     took a long time to go to sleep with last colonoscopy    CAD (coronary artery disease) 2010     MI?     Chronic pain       hip rt    Gastrointestinal disorder       GERD    Glaucoma      Hypertension      Ill-defined condition osteopenia    Ill-defined condition       pulled shoulder lt. and elbow replacement     Past Surgical History:   Procedure Laterality Date    COLONOSCOPY N/A 9/9/2016     COLONOSCOPY performed by Michelle Cervantes [facility] at [facility]   9/9/2016          HX COLONOSCOPY        HX ORTHOPAEDIC         left arm surgery elbow replacement, rods in lt arm     Prior Level of Function/Home Situation: pt is independent for mobility at baseline, however reports using SPC for the past 2.5 months due to back pain; lives alone, however has good family support; son will be present at discharge; pt lives in three story home with kitchen and living room on 2nd floor and bedroom on 3rd floor  Personal factors and/or comorbidities impacting plan of care: HTN; chronic pain     Home Situation  Home Environment: Private residence  # Steps to Enter: 1  Rails to Enter: No  Wheelchair Ramp: No  One/Two Story Residence: Other (Comment) (three level)  # of Interior Steps: 14 (between levels)  Interior Rails: Right  Lift Chair Available: No  Living Alone: Yes  Support Systems: Child(maury), [employer] / hen[employer] community, Family member(s), Friends \ neighbors  Patient Expects to be Discharged to[de-identified] Private residence  Current DME Used/Available at Home: Hospital bed, Grab bars, Blood pressure cuff, Raised toilet seat, Walker, rolling, Cane, straight, Cane, quad  Tub or Shower Type: Shower     EXAMINATION/PRESENTATION/DECISION MAKING:   Critical Behavior:  Neurologic State: Alert  Orientation Level: Disoriented to time, Oriented to person, Oriented to place, Oriented to situation  Cognition: Follows commands     Hearing:   Auditory  Auditory Impairment: Hard of hearing, bilateral  Skin:  Back incision  Edema: None  Range Of Motion:  AROM: Generally decreased, functional           PROM: Within functional limits           Strength:    Strength: Generally decreased, functional                    Tone & Sensation:   Tone: Normal                              Coordination:  Coordination: Within functional limits  Vision:      Functional Mobility:  Bed Mobility:  Rolling: Contact guard assistance  Supine to Sit: Contact guard assistance  Sit to Supine: Contact guard assistance  Scooting: Contact guard assistance  Transfers:  Sit to Stand: Contact guard assistance  Stand to Sit: Contact guard assistance                       Balance:   Sitting: Intact  Standing: Intact; With support  Ambulation/Gait Training:  Distance (ft): 220 Feet (ft) ([de-identified]' with RW; 140' with HHA x 1)  Assistive Device: Gait belt;Walker, rolling  Ambulation - Level of Assistance: Contact guard assistance;Assist x1;Additional time; Adaptive equipment     Gait Description (WDL): Exceptions to WDL  Gait Abnormalities: Decreased step clearance (mild forward flexed trunk)        Base of Support: Widened     Speed/Venita: Pace decreased (<100 feet/min)  Step Length: Left shortened;Right shortened        Functional Measure:  Barthel Index:      Bathin  Bladder: 10  Bowels: 10  Groomin  Dressin  Feeding: 10  Mobility: 10  Stairs: 5  Toilet Use: 5  Transfer (Bed to Chair and Back): 10  Total: 70         Barthel and G-code impairment scale:  Percentage of impairment CH  0% CI  1-19% CJ  20-39% CK  40-59% CL  60-79% CM  80-99% CN  100%   Barthel Score 0-100 100 99-80 79-60 59-40 20-39 1-19    0   Barthel Score 0-20 20 17-19 13-16 9-12 5-8 1-4 0      The Barthel ADL Index: Guidelines  1. The index should be used as a record of what a patient does, not as a record of what a patient could do. 2. The main aim is to establish degree of independence from any help, physical or verbal, however minor and for whatever reason. 3. The need for supervision renders the patient not independent. 4. A patient's performance should be established using the best available evidence.  Asking the patient, friends/relatives and nurses are the usual sources, but direct observation and common sense are also important. However direct testing is not needed. 5. Usually the patient's performance over the preceding 24-48 hours is important, but occasionally longer periods will be relevant. 6. Middle categories imply that the patient supplies over 50 per cent of the effort. 7. Use of aids to be independent is allowed. Abril Gilbert., Barthel, D.W. ([id number]). Functional evaluation: the Barthel Index. [address] (14)2. [facility] murali RADHA MartinezF, Ynes Mullen., Rina Hong., [place], [address] [facility] (1999). Measuring the change indisability after inpatient rehabilitation; comparison of the responsiveness of the Barthel Index and Functional [place] Measure. Journal of Neurology, Neurosurgery, and Psychiatry, 66(4), 751-537. Maira Carrion, N.J.A, BOYD Cornelius, & Barbara Hunt M.A. (2004.) Assessment of post-stroke quality of life in cost-effectiveness studies: The usefulness of the Barthel Index and the EuroQoL-5D. Quality of Life Research, 13, 552-18         G codes: In compliance with CMSs Claims Based Outcome Reporting, the following G-code set was chosen for this patient based on their primary functional limitation being treated: The outcome measure chosen to determine the severity of the functional limitation was the Barthel Index with a score of 70/100 which was correlated with the impairment scale.       · Mobility - Walking and Moving Around:               - CURRENT STATUS:    CJ - 20%-39% impaired, limited or restricted               - GOAL STATUS:           CI - 1%-19% impaired, limited or restricted               - D/C STATUS:                       ---------------To be determined---------------      Physical Therapy Evaluation Charge Determination   History Examination Presentation Decision-Making   MEDIUM  Complexity : 1-2 comorbidities / personal factors will impact the outcome/ POC  HIGH Complexity : 4+ Standardized tests and measures addressing body structure, function, activity limitation and / or participation in recreation  MEDIUM Complexity : Evolving with changing characteristics  Other outcome measures Barthel index  MEDIUM      Based on the above components, the patient evaluation is determined to be of the following complexity level: MEDIUM     Pain:  Pain Scale 1: Numeric (0 - 10)  Pain Intensity 1: 0              Activity Tolerance:   Good - BP stable; pt without pain complaints during mobility  Please refer to the flowsheet for vital signs taken during this treatment. After treatment:   [X]         Patient left in no apparent distress sitting up in chair  [ ]         Patient left in no apparent distress in bed  [X]         Call bell left within reach  [X]         Nursing notified  [X]         Caregiver present  [ ]         Bed alarm activated      COMMUNICATION/EDUCATION:   The patients plan of care was discussed with: Physical Therapist and Registered Nurse.  [X]         Fall prevention education was provided and the patient/caregiver indicated understanding. [X]         Patient/family have participated as able in goal setting and plan of care. [X]         Patient/family agree to work toward stated goals and plan of care. [ ]         Patient understands intent and goals of therapy, but is neutral about his/her participation. [ ]         Patient is unable to participate in goal setting and plan of care.      Thank you for this referral.  Aldo Sidhu, PT, DPT   Time Calculation: 38 mins

## 2017-06-20 NOTE — PROGRESS NOTES
Occupational Therapy EVALUATION/discharge  Patient: Billy Calloway (75 y.o. female)  Date: 6/20/2017  Primary Diagnosis: LUMBAGO, RADICULOPATHY, HNP  LUMBAGO, RADICULOPATHY, HNP  Procedure(s) (LRB):  L4-5 DISCECTOMY (Bilateral) 1 Day Post-Op   Precautions:   Fall, Back    ASSESSMENT:   Based on the objective data described below, the patient presents with post op spine precautions, and mildly decreased balance which is impacting her functional independence. Upon completion of all training and education provided during this evaluation the patient was able to perform ADL's at an independent to supervision level and functional mobility at a mod I to supervision level. Patient able to recall 3/3 spine precautions, but required occasional cueing for consistent adherence during ADLs. Patient also doffed and donned LSO with supervision. Her son was present for all training/education provided and will be able to provide any needed cueing to ensure adherence to spine precautions. All training and education has been completed, and the patient is without further OT needs after discharge. Recommend continued supervision after discharge until her mobility improves and she is more consistently adherent to her spine precautions. Further skilled acute occupational therapy is not indicated at this time. Discharge Recommendations: Home Health PT and supervision   Further Equipment Recommendations for Discharge: none        OBJECTIVE DATA SUMMARY:   HISTORY:   Past Medical History:   Diagnosis Date    Adverse effect of anesthesia 2005    took a long time to go to sleep with last colonoscopy    CAD (coronary artery disease) 2010    MI?  Chronic pain     hip rt    Gastrointestinal disorder     GERD    Glaucoma     Hypertension     Ill-defined condition     osteopenia    Ill-defined condition     pulled shoulder lt.  and elbow replacement     Past Surgical History:   Procedure Laterality Date    COLONOSCOPY N/A 9/9/2016 COLONOSCOPY performed by Holly Quarles MD at [address]  9/9/2016         HX COLONOSCOPY      HX ORTHOPAEDIC      left arm surgery elbow replacement, rods in lt arm       Prior Level of Function/Home Situation: Independent with ADLs and  for mobility at baseline, however reports using SPC for the past 2.5 months due to back pain. lives alone, but has good family support; son will be present at discharge. Pt lives in three story home with kitchen and living room on 2nd floor and bedroom on 3rd floor. Expanded or extensive additional review of patient history:     Home Situation  Home Environment: Private residence  # Steps to Enter: 1  Rails to Enter: No  Wheelchair Ramp: No  One/Two Story Residence: Other (Comment) (three level)  # of Interior Steps: 14 (between levels)  Interior Rails: Right  Lift Chair Available: No  Living Alone: Yes  Support Systems: Child(maury), [employer] / hen[employer] community, Family member(s), Friends \ neighbors  Patient Expects to be Discharged to[de-identified] Private residence  Current DME Used/Available at Home: Hospital bed, Grab bars, Blood pressure cuff, Raised toilet seat, Walker, rolling, Cane, straight, Cane, quad  Tub or Shower Type: Shower  [x]  Right hand dominant   []  Left hand dominant    EXAMINATION OF PERFORMANCE DEFICITS:  Cognitive/Behavioral Status:  Neurologic State: Alert  Orientation Level: Oriented X4  Cognition: Decreased attention/concentration; Follows commands        Safety/Judgement: Decreased awareness of need for safety; Insight into deficits      Hearing: Auditory  Auditory Impairment: Hard of hearing, bilateral    Vision/Perceptual:      Acuity: Within Defined Limits         Range of Motion:  AROM: Within functional limits  PROM: Within functional limits                      Strength:  Strength:  Within functional limits                Coordination:  Coordination: Within functional limits            Tone & Sensation:  Tone: Normal  Sensation: Intact                      Balance:  Sitting: Intact  Standing: Impaired  Standing - Static: Good  Standing - Dynamic : Good    Functional Mobility and Transfers for ADLs:  Bed Mobility:  Rolling: Modified independent  Supine to Sit: Modified independent  Sit to Supine: Modified independent  Scooting: Independent    Transfers:  Sit to Stand: Modified independent  Stand to Sit: Modified independent  Bed to Chair: Supervision  Toilet Transfer : Modified independent  Shower Transfer: Modified independent    ADL Assessment:  Feeding: Independent    Oral Facial Hygiene/Grooming: Independent    Bathing: Supervision;Setup    Upper Body Dressing: Supervision    Lower Body Dressing: Supervision;Setup    Toileting: Supervision                ADL Intervention and task modifications:  Provided review of spine precautions, as well as ADL and functional mobility training as it relates to adherence to spine precautions. LSO management: donned/doffed with supervision. Shower transfers: supervision stepping backward in and forward out, with support of RW.  LB dressing: supervision/setup using crossed leg technique, while seated EOB. Cueing needed for consistent adherence to spine precautions. Functional Measure:  Barthel Index:    Bathin  Bladder: 10  Bowels: 10  Groomin  Dressin  Feeding: 10  Mobility: 10  Stairs: 5  Toilet Use: 5  Transfer (Bed to Chair and Back): 10  Total: 70       Barthel and G-code impairment scale:  Percentage of impairment CH  0% CI  1-19% CJ  20-39% CK  40-59% CL  60-79% CM  80-99% CN  100%   Barthel Score 0-100 100 99-80 79-60 59-40 20-39 1-19   0   Barthel Score 0-20 20 17-19 13-16 9-12 5-8 1-4 0      The Barthel ADL Index: Guidelines  1. The index should be used as a record of what a patient does, not as a record of what a patient could do. 2. The main aim is to establish degree of independence from any help, physical or verbal, however minor and for whatever reason.   3. The need for supervision renders the patient not independent. 4. A patient's performance should be established using the best available evidence. Asking the patient, friends/relatives and nurses are the usual sources, but direct observation and common sense are also important. However direct testing is not needed. 5. Usually the patient's performance over the preceding 24-48 hours is important, but occasionally longer periods will be relevant. 6. Middle categories imply that the patient supplies over 50 per cent of the effort. 7. Use of aids to be independent is allowed. Eusebio Lewis., Barthel, D.W. ([id number]). Functional evaluation: the Barthel Index. [address] [facility] (14)2. Barbara Marcus murali ANDRA Martinez, Antelmo Tobias., Mimi Pineda., [place], [address] [place] [address] Ave (1999). Measuring the change indisability after inpatient rehabilitation; comparison of the responsiveness of the Barthel Index and Functional [place] Measure. Journal of Neurology, Neurosurgery, and Psychiatry, 66(4), 612-086. AVELINO Miller.ROBINSON, BOYD Cornelius, & Nay Eason MIWONA. (2004.) Assessment of post-stroke quality of life in cost-effectiveness studies: The usefulness of the Barthel Index and the EuroQoL-5D. Quality of Life Research, 13, 068-18         G codes: In compliance with CMSs Claims Based Outcome Reporting, the following G-code set was chosen for this patient based on their primary functional limitation being treated: The outcome measure chosen to determine the severity of the functional limitation was the Barthel Index with a score of 70/100 which was correlated with the impairment scale. ?  Self Care:     - CURRENT STATUS: CJ - 20%-39% impaired, limited or restricted    - GOAL STATUS: CJ - 20%-39% impaired, limited or restricted    - D/C STATUS:  CJ - 20%-39% impaired, limited or restricted     Pain:  Pain Scale 1: Numeric (0 - 10)  Pain Intensity 1: 0       After treatment:   []  Patient left in no apparent distress sitting up in chair  [x]  Patient left in no apparent distress in bed  [x]  Call bell left within reach  [x]  Nursing notified  []  Caregiver present  [x]  Bed alarm activated    COMMUNICATION/EDUCATION:   Communication/Collaboration:  [x]      Home safety education was provided and the patient/caregiver indicated understanding. [x]      Patient/family have participated as able and agree with findings and recommendations. []      Patient is unable to participate in plan of care at this time.   Findings and recommendations were discussed with: Physical Therapist    JOCELYNE Hines/L  Time Calculation: 40 mins

## 2017-06-21 ENCOUNTER — PATIENT OUTREACH (OUTPATIENT)
Dept: INTERNAL MEDICINE CLINIC | Age: 82
End: 2017-06-21

## 2017-06-21 NOTE — PROGRESS NOTES
Late Entry:  Pt is an [de-identified] y/o  female admitted with lumbago, radiculopathy, hnp. Pt is independent to ADL's and IADL's to include driving. Pt will need HH at discharged and chose [facility]. Referral sent and they have accepted. Info entered on AVS.  Pt son to transport at discharge. Care Management Interventions  PCP Verified by CM: Yes  Mode of Transport at Discharge:  Other (see comment) (Pt family to transport at discharge)  Transition of Care Consult (CM Consult): Home Health  Physical Therapy Consult: Yes  Occupational Therapy Consult: Yes  Current Support Network: Own Home (Pt resides in a 2 story home)  Confirm Follow Up Transport: Self (Pt drives but has supportive family to assist if needed)  Plan discussed with Pt/Family/Caregiver: Yes  Discharge Location  Discharge Placement: Home with home health    ADELINE Samuels  Ext [phone]

## 2017-06-28 ENCOUNTER — TELEPHONE (OUTPATIENT)
Dept: INTERNAL MEDICINE CLINIC | Age: 82
End: 2017-06-28

## 2017-06-28 NOTE — TELEPHONE ENCOUNTER
Spoke with [address] [place] [address] 153/[id number] 166/72 prior to activity on 6/27/17  Patient on Losartan no chief complaints voiced by patient.

## 2017-06-28 NOTE — TELEPHONE ENCOUNTER
Jeny[facility] states she needs to discuss some elevated Blood pressure readings since patient's spinal surgery. Please call to discuss & advise if any adjustments needed to medications.  Thank you

## 2017-07-03 NOTE — TELEPHONE ENCOUNTER
Identified patient 2 identifiers verified. Patient is checking  BP twice daily and informed to record.

## 2017-07-20 RX ORDER — LOSARTAN POTASSIUM 50 MG/1
TABLET ORAL
Qty: 90 TAB | Refills: 2 | Status: SHIPPED | OUTPATIENT
Start: 2017-07-20 | End: 2019-01-11 | Stop reason: SDUPTHER

## 2017-08-29 ENCOUNTER — TELEPHONE (OUTPATIENT)
Dept: INTERNAL MEDICINE CLINIC | Age: 82
End: 2017-08-29

## 2017-08-29 DIAGNOSIS — R73.9 HYPERGLYCEMIA: ICD-10-CM

## 2017-08-29 DIAGNOSIS — I10 ESSENTIAL HYPERTENSION: Primary | ICD-10-CM

## 2017-08-29 DIAGNOSIS — E78.2 MIXED HYPERLIPIDEMIA: ICD-10-CM

## 2017-08-29 DIAGNOSIS — E55.9 VITAMIN D DEFICIENCY: ICD-10-CM

## 2017-08-29 NOTE — TELEPHONE ENCOUNTER
Patient states she needs a call back to be discuss getting lab orders to have blood work done. Please call to discuss.  Thank you

## 2017-08-30 RX ORDER — OMEPRAZOLE 40 MG/1
40 CAPSULE, DELAYED RELEASE ORAL DAILY
Qty: 90 CAP | Refills: 0 | Status: SHIPPED | OUTPATIENT
Start: 2017-08-30 | End: 2017-10-17 | Stop reason: SDUPTHER

## 2017-09-14 ENCOUNTER — TELEPHONE (OUTPATIENT)
Dept: INTERNAL MEDICINE CLINIC | Age: 82
End: 2017-09-14

## 2017-09-15 ENCOUNTER — HOSPITAL ENCOUNTER (OUTPATIENT)
Dept: LAB | Age: 82
Discharge: HOME | End: 2017-09-15
Payer: MEDICARE

## 2017-09-15 ENCOUNTER — OFFICE VISIT (OUTPATIENT)
Dept: INTERNAL MEDICINE CLINIC | Age: 82
End: 2017-09-15

## 2017-09-15 VITALS
OXYGEN SATURATION: 96 % | RESPIRATION RATE: 16 BRPM | DIASTOLIC BLOOD PRESSURE: 76 MMHG | SYSTOLIC BLOOD PRESSURE: 143 MMHG | WEIGHT: 189 LBS | HEART RATE: 66 BPM | TEMPERATURE: 98.1 F | BODY MASS INDEX: 29.66 KG/M2 | HEIGHT: 67 IN

## 2017-09-15 DIAGNOSIS — Z23 ENCOUNTER FOR IMMUNIZATION: Primary | ICD-10-CM

## 2017-09-15 PROBLEM — M51.26 DISPLACEMENT OF LUMBAR INTERVERTEBRAL DISC WITHOUT MYELOPATHY: Status: ACTIVE | Noted: 2017-06-05

## 2017-09-15 PROBLEM — M21.371 RIGHT FOOT DROP: Status: ACTIVE | Noted: 2017-06-05

## 2017-09-15 PROBLEM — M54.41 CHRONIC LOW BACK PAIN WITH RIGHT-SIDED SCIATICA: Status: ACTIVE | Noted: 2017-06-05

## 2017-09-15 PROBLEM — G89.29 CHRONIC LOW BACK PAIN WITH RIGHT-SIDED SCIATICA: Status: ACTIVE | Noted: 2017-06-05

## 2017-09-15 PROBLEM — M47.817 LUMBOSACRAL SPONDYLOSIS WITHOUT MYELOPATHY: Status: ACTIVE | Noted: 2017-06-05

## 2017-09-15 PROCEDURE — 82306 VITAMIN D 25 HYDROXY: CPT

## 2017-09-15 PROCEDURE — 80061 LIPID PANEL: CPT

## 2017-09-15 PROCEDURE — 80053 COMPREHEN METABOLIC PANEL: CPT

## 2017-09-15 PROCEDURE — 83036 HEMOGLOBIN GLYCOSYLATED A1C: CPT

## 2017-09-15 PROCEDURE — 36415 COLL VENOUS BLD VENIPUNCTURE: CPT

## 2017-09-15 NOTE — PROGRESS NOTES
SUBJECTIVE:   Ms. Deysi Duncan is a [de-identified] y.o. female who is here for follow up of htn. Pt's BP is elevated at 143/76 today. Pt reports occasional BLE swelling. Pt had an L4-L5 discectomy with Dr. Nellie Lees on 6/19/2017. Pt reports a tingling sensation in bilateral feet. Pt reports the feeling was gone upon waking up from surgery and with use of Gabapentin post surgery. Pt states the sensation has returned since discontinuing Gabapentin. Pt states Dr. Nellie Lees (orthopedic) told her it takes a long time for the nerves to return to normal post-surgery, but he gave her a machine to check blood sugar due to h/o elevated A1c. Pt brought in a blood sugar log today. Pt denies breaks in skin or irritation on feet bilaterally. Pt reports episode of abdominal pain several weeks ago. Pt reports drinking coffee on an empty stomach, stating this may have been a GERD reflux. Pt was prescribed Sucralfate at the ED. Pt reports exercise with water aerobics multiple times weekly. At this time, she is otherwise doing well and has brought no other complaints to my attention today. For a list of the medical issues addressed today, see the assessment and plan below. PMH:   Past Medical History:   Diagnosis Date    Adverse effect of anesthesia 2005    took a long time to go to sleep with last colonoscopy    CAD (coronary artery disease) 2010    MI?  Chronic pain     hip rt    Gastrointestinal disorder     GERD    Glaucoma     Hypertension     Ill-defined condition     osteopenia    Ill-defined condition     pulled shoulder lt. and elbow replacement     PSH:  has a past surgical history that includes colonoscopy; colonoscopy,diagnostic (9/9/2016); colonoscopy (N/A, 9/9/2016); and orthopaedic. All: is allergic to fosamax [alendronate]. MEDS:   Current Outpatient Prescriptions   Medication Sig    omeprazole (PRILOSEC) 40 mg capsule Take 1 Cap by mouth daily.     losartan (COZAAR) 50 mg tablet TAKE 1 TABLET DAILY    cholecalciferol, vitamin D3, (VITAMIN D3) 2,000 unit tab Take 1 Tab by mouth daily.  sodium chloride (EMIR-5) 5 % ophthalmic solution Administer 1 Drop to both eyes as needed.  simvastatin (ZOCOR) 20 mg tablet TAKE 1 TABLET NIGHTLY    timolol maleate 0.5 % DrpD ophthalmic solution Administer 1 Drop to both eyes daily.  aspirin delayed-release (ASPIR-LOW) 81 mg tablet Take 81 mg by mouth daily. Indications: CEREBRAL THROMBOEMBOLISM PREVENTION, PREVENTION OF TRANSIENT ISCHEMIC ATTACKS     No current facility-administered medications for this visit. FH: family history includes Cancer in her father; No Known Problems in her mother. SH:  reports that she quit smoking about 58 years ago. She has never used smokeless tobacco. She reports that she drinks about 0.5 oz of alcohol per week  She reports that she does not use illicit drugs. Review of Systems - History obtained from the patient  General ROS: negative  Psychological ROS: negative  Ophthalmic ROS: negative  ENT ROS: negative  Respiratory ROS: no cough, shortness of breath, or wheezing  Cardiovascular ROS: no chest pain or dyspnea on exertion  Gastrointestinal ROS: no abdominal pain, change in bowel habits, or black or bloody stools  Genito-Urinary ROS: negative  Musculoskeletal ROS: negative  Neurological ROS: +tingling sensation in bilateral feet, otherwsie negative  Dermatological ROS: negative    OBJECTIVE:   Vitals:   Visit Vitals    /76 (BP 1 Location: Right arm, BP Patient Position: Sitting)    Pulse 66    Temp 98.1 °F (36.7 °C) (Oral)    Resp 16    Ht 5' 6.5\" (1.689 m)    Wt 189 lb (85.7 kg)    SpO2 96%    BMI 30.05 kg/m2      Gen: Pleasant [de-identified] y.o.  female in NAD. HEENT: NC/AT. HEART: RRR, No M/G/R. LUNGS: CTAB No W/R. EXTREMITIES: Warm. No C/C/E. FEET: sensation in toes slightly decreased. NEURO: Alert and oriented x 3. Cranial nerves grossly intact. No focal sensory or motor deficits noted. SKIN: Warm. Dry. No rashes or other lesions noted. ASSESSMENT/ PLAN:     Diagnoses and all orders for this visit:    1. Encounter for immunization  -     Influenza virus vaccine ([address] 80) 72 years and older (23961)      Pt and I discussed that the tingling sensation in her feet is likely due to surgery and not her diabetes. Pt was given the high dose Influenza vaccine today. Pt will f/u in 3 months for DM. Follow-up Disposition:  Return in about 3 months (around 12/15/2017) for follow up diabetes. I have reviewed the patient's medications and risks/side effects/benefits were discussed. Diagnosis(-es) explained to patient and questions answered. Literature provided where appropriate.      Written by Darline Vasquez, as dictated by Trisha Haley MD.

## 2017-09-15 NOTE — PATIENT INSTRUCTIONS
Vaccine Information Statement    Influenza (Flu) Vaccine (Inactivated or Recombinant): What you need to know    Many Vaccine Information Statements are available in Armenian and other languages. See www.immunize.org/vis  Hojas de Información Sobre Vacunas están disponibles en Español y en muchos otros idiomas. Visite www.immunize.org/vis    1. Why get vaccinated? Influenza (flu) is a contagious disease that spreads around the [place] every year, usually between October and May. Flu is caused by influenza viruses, and is spread mainly by coughing, sneezing, and close contact. Anyone can get flu. Flu strikes suddenly and can last several days. Symptoms vary by age, but can include:   fever/chills   sore throat   muscle aches   fatigue   cough   headache    runny or stuffy nose    Flu can also lead to pneumonia and blood infections, and cause diarrhea and seizures in children. If you have a medical condition, such as heart or lung disease, flu can make it worse. Flu is more dangerous for some people. Infants and young children, people 72years of age and older, pregnant women, and people with certain health conditions or a weakened immune system are at greatest risk. Each year thousands of people in the [facility] die from flu, and many more are hospitalized. Flu vaccine can:   keep you from getting flu,   make flu less severe if you do get it, and   keep you from spreading flu to your family and other people. 2. Inactivated and recombinant flu vaccines    A dose of flu vaccine is recommended every flu season. Children 6 months through 6years of age may need two doses during the same flu season. Everyone else needs only one dose each flu season.        Some inactivated flu vaccines contain a very small amount of a mercury-based preservative called thimerosal. Studies have not shown thimerosal in vaccines to be harmful, but flu vaccines that do not contain thimerosal are available. There is no live flu virus in flu shots. They cannot cause the flu. There are many flu viruses, and they are always changing. Each year a new flu vaccine is made to protect against three or four viruses that are likely to cause disease in the upcoming flu season. But even when the vaccine doesnt exactly match these viruses, it may still provide some protection    Flu vaccine cannot prevent:   flu that is caused by a virus not covered by the vaccine, or   illnesses that look like flu but are not. It takes about 2 weeks for protection to develop after vaccination, and protection lasts through the flu season. 3. Some people should not get this vaccine    Tell the person who is giving you the vaccine:     If you have any severe, life-threatening allergies. If you ever had a life-threatening allergic reaction after a dose of flu vaccine, or have a severe allergy to any part of this vaccine, you may be advised not to get vaccinated. Most, but not all, types of flu vaccine contain a small amount of egg protein.  If you ever had Guillain-Barré Syndrome (also called GBS). Some people with a history of GBS should not get this vaccine. This should be discussed with your doctor.  If you are not feeling well. It is usually okay to get flu vaccine when you have a mild illness, but you might be asked to come back when you feel better. 4. Risks of a vaccine reaction    With any medicine, including vaccines, there is a chance of reactions. These are usually mild and go away on their own, but serious reactions are also possible. Most people who get a flu shot do not have any problems with it.      Minor problems following a flu shot include:    soreness, redness, or swelling where the shot was given     hoarseness   sore, red or itchy eyes   cough   fever   aches   headache   itching   fatigue  If these problems occur, they usually begin soon after the shot and last 1 or 2 days. More serious problems following a flu shot can include the following:     There may be a small increased risk of Guillain-Barré Syndrome (GBS) after inactivated flu vaccine. This risk has been estimated at 1 or 2 additional cases per million people vaccinated. This is much lower than the risk of severe complications from flu, which can be prevented by flu vaccine.  Young children who get the flu shot along with pneumococcal vaccine (PCV13) and/or DTaP vaccine at the same time might be slightly more likely to have a seizure caused by fever. Ask your doctor for more information. Tell your doctor if a child who is getting flu vaccine has ever had a seizure. Problems that could happen after any injected vaccine:      People sometimes faint after a medical procedure, including vaccination. Sitting or lying down for about 15 minutes can help prevent fainting, and injuries caused by a fall. Tell your doctor if you feel dizzy, or have vision changes or ringing in the ears.  Some people get severe pain in the shoulder and have difficulty moving the arm where a shot was given. This happens very rarely.  Any medication can cause a severe allergic reaction. Such reactions from a vaccine are very rare, estimated at about 1 in a million doses, and would happen within a few minutes to a few hours after the vaccination. As with any medicine, there is a very remote chance of a vaccine causing a serious injury or death. The safety of vaccines is always being monitored. For more information, visit: www.cdc.gov/vaccinesafety/    5. What if there is a serious reaction? What should I look for?  Look for anything that concerns you, such as signs of a severe allergic reaction, very high fever, or unusual behavior.     Signs of a severe allergic reaction can include hives, swelling of the face and throat, difficulty breathing, a fast heartbeat, dizziness, and weakness  usually within a few minutes to a few hours after the vaccination. What should I do?  If you think it is a severe allergic reaction or other emergency that cant wait, call 9-1-1 and get the person to the nearest hospital. Otherwise, call your doctor.  Reactions should be reported to the Vaccine Adverse Event Reporting System (VAERS). Your doctor should file this report, or you can do it yourself through  the VAERS web site at www.vaers. [facility].gov, or by calling 9-361.594.3167. VAERS does not give medical advice. 6. The National Vaccine Injury Compensation Program    The [facility] Vaccine Injury Compensation Program (VICP) is a federal program that was created to compensate people who may have been injured by certain vaccines. Persons who believe they may have been injured by a vaccine can learn about the program and about filing a claim by calling 4-386.729.1880 or visiting the [employer] website at www.[facility].gov/vaccinecompensation. There is a time limit to file a claim for compensation. 7. How can I learn more?  Ask your healthcare provider. He or she can give you the vaccine package insert or suggest other sources of information.  Call your local or state health department.  Contact the Centers for Disease Control and Prevention (CDC):  - Call 9-551.959.2222 (1-800-CDC-INFO) or  - Visit CDCs website at www.cdc.gov/flu    Vaccine Information Statement   Inactivated Influenza Vaccine   8/7/2015  42 E[address] [id number]    Department of Health and Human Services  Centers for Disease Control and Prevention    Office Use Only

## 2017-09-15 NOTE — MR AVS SNAPSHOT
Visit Information Date & Time Provider Department Dept. Phone Encounter #  
 9/15/2017 10:15 AM Ricardo Armas, [address] [place] [address] [id number] Follow-up Instructions Return in about 3 months (around 12/15/2017) for follow up diabetes. Upcoming Health Maintenance Date Due DTaP/Tdap/Td series (1 - Tdap) 11/8/1956 OSTEOPOROSIS SCREENING (DEXA) 11/8/2001 Pneumococcal 65+ Low/Medium Risk (2 of 2 - PCV13) 11/10/2015 INFLUENZA AGE 9 TO ADULT 8/1/2017 GLAUCOMA SCREENING Q2Y 10/6/2017 MEDICARE YEARLY EXAM 6/6/2018 Allergies as of 9/15/2017  Review Complete On: 9/15/2017 By: Ricardo Armas MD  
  
 Severity Noted Reaction Type Reactions Fosamax [Alendronate]  04/01/2011    Nausea Only Current Immunizations  Never Reviewed Name Date Influenza Vaccine 11/10/2014, 11/1/2013 Influenza Vaccine (Quad) PF 1/17/2017 Pneumococcal Polysaccharide (PPSV-23) 11/10/2014 Zoster 1/1/2012 Not reviewed this visit You Were Diagnosed With   
  
 Codes Comments Encounter for immunization    -  Primary ICD-10-CM: I73 ICD-9-CM: V03.89 Vitals BP Pulse Temp Resp Height(growth percentile) Weight(growth percentile) 143/76 (BP 1 Location: Right arm, BP Patient Position: Sitting) 66 98.1 °F (36.7 °C) (Oral) 16 5' 6.5\" (1.689 m) 189 lb (85.7 kg) SpO2 BMI OB Status Smoking Status 96% 30.05 kg/m2 Postmenopausal Former Smoker Vitals History BMI and BSA Data Body Mass Index Body Surface Area 30.05 kg/m 2 2.01 m 2 Preferred Pharmacy Pharmacy Name Phone [address] [facility] [phone] Your Updated Medication List  
  
   
This list is accurate as of: 9/15/17 11:48 AM.  Always use your most recent med list.  
  
  
  
  
 ASPIR-LOW 81 mg tablet Generic drug:  aspirin delayed-release Take 81 mg by mouth daily. Indications: CEREBRAL THROMBOEMBOLISM PREVENTION, PREVENTION OF TRANSIENT ISCHEMIC ATTACKS  
  
 losartan 50 mg tablet Commonly known as:  COZAAR  
TAKE 1 TABLET DAILY  
  
 omeprazole 40 mg capsule Commonly known as:  PRILOSEC Take 1 Cap by mouth daily. simvastatin 20 mg tablet Commonly known as:  ZOCOR  
TAKE 1 TABLET NIGHTLY  
  
 sodium chloride 5 % ophthalmic solution Commonly known as:  EMIR-5 Administer 1 Drop to both eyes as needed. timolol maleate 0.5 % Drpd ophthalmic solution Administer 1 Drop to both eyes daily. VITAMIN D3 2,000 unit Tab Generic drug:  cholecalciferol (vitamin D3) Take 1 Tab by mouth daily. Follow-up Instructions Return in about 3 months (around 12/15/2017) for follow up diabetes. Patient Instructions Vaccine Information Statement Influenza (Flu) Vaccine (Inactivated or Recombinant): What you need to know Many Vaccine Information Statements are available in Pashto and other languages. See www.immunize.org/vis Hojas de Información Sobre Vacunas están disponibles en Español y en muchos otros idiomas. Visite www.immunize.org/vis 1. Why get vaccinated? Influenza (flu) is a contagious disease that spreads around the [place] every year, usually between October and May. Flu is caused by influenza viruses, and is spread mainly by coughing, sneezing, and close contact. Anyone can get flu. Flu strikes suddenly and can last several days. Symptoms vary by age, but can include: 
 fever/chills  sore throat  muscle aches  fatigue  cough  headache  runny or stuffy nose Flu can also lead to pneumonia and blood infections, and cause diarrhea and seizures in children. If you have a medical condition, such as heart or lung disease, flu can make it worse. Flu is more dangerous for some people.  Infants and young children, people 72years of age and older, pregnant women, and people with certain health conditions or a weakened immune system are at greatest risk. Each year thousands of people in the [facility] die from flu, and many more are hospitalized. Flu vaccine can: 
 keep you from getting flu, 
 make flu less severe if you do get it, and 
 keep you from spreading flu to your family and other people. 2. Inactivated and recombinant flu vaccines A dose of flu vaccine is recommended every flu season. Children 6 months through 6years of age may need two doses during the same flu season. Everyone else needs only one dose each flu season. Some inactivated flu vaccines contain a very small amount of a mercury-based preservative called thimerosal. Studies have not shown thimerosal in vaccines to be harmful, but flu vaccines that do not contain thimerosal are available. There is no live flu virus in flu shots. They cannot cause the flu. There are many flu viruses, and they are always changing. Each year a new flu vaccine is made to protect against three or four viruses that are likely to cause disease in the upcoming flu season. But even when the vaccine doesnt exactly match these viruses, it may still provide some protection Flu vaccine cannot prevent: 
 flu that is caused by a virus not covered by the vaccine, or 
 illnesses that look like flu but are not. It takes about 2 weeks for protection to develop after vaccination, and protection lasts through the flu season. 3. Some people should not get this vaccine Tell the person who is giving you the vaccine:  If you have any severe, life-threatening allergies. If you ever had a life-threatening allergic reaction after a dose of flu vaccine, or have a severe allergy to any part of this vaccine, you may be advised not to get vaccinated. Most, but not all, types of flu vaccine contain a small amount of egg protein.  If you ever had Guillain-Barré Syndrome (also called GBS). Some people with a history of GBS should not get this vaccine. This should be discussed with your doctor.  If you are not feeling well. It is usually okay to get flu vaccine when you have a mild illness, but you might be asked to come back when you feel better. 4. Risks of a vaccine reaction With any medicine, including vaccines, there is a chance of reactions. These are usually mild and go away on their own, but serious reactions are also possible. Most people who get a flu shot do not have any problems with it. Minor problems following a flu shot include:  
 soreness, redness, or swelling where the shot was given  hoarseness  sore, red or itchy eyes  cough  fever  aches  headache  itching  fatigue If these problems occur, they usually begin soon after the shot and last 1 or 2 days. More serious problems following a flu shot can include the following:  There may be a small increased risk of Guillain-Barré Syndrome (GBS) after inactivated flu vaccine. This risk has been estimated at 1 or 2 additional cases per million people vaccinated. This is much lower than the risk of severe complications from flu, which can be prevented by flu vaccine.  Young children who get the flu shot along with pneumococcal vaccine (PCV13) and/or DTaP vaccine at the same time might be slightly more likely to have a seizure caused by fever. Ask your doctor for more information. Tell your doctor if a child who is getting flu vaccine has ever had a seizure. Problems that could happen after any injected vaccine:  People sometimes faint after a medical procedure, including vaccination. Sitting or lying down for about 15 minutes can help prevent fainting, and injuries caused by a fall. Tell your doctor if you feel dizzy, or have vision changes or ringing in the ears.  Some people get severe pain in the shoulder and have difficulty moving the arm where a shot was given. This happens very rarely.  Any medication can cause a severe allergic reaction. Such reactions from a vaccine are very rare, estimated at about 1 in a million doses, and would happen within a few minutes to a few hours after the vaccination. As with any medicine, there is a very remote chance of a vaccine causing a serious injury or death. The safety of vaccines is always being monitored. For more information, visit: www.cdc.gov/vaccinesafety/ 
 
5. What if there is a serious reaction? What should I look for?  Look for anything that concerns you, such as signs of a severe allergic reaction, very high fever, or unusual behavior. Signs of a severe allergic reaction can include hives, swelling of the face and throat, difficulty breathing, a fast heartbeat, dizziness, and weakness  usually within a few minutes to a few hours after the vaccination. What should I do?  If you think it is a severe allergic reaction or other emergency that cant wait, call 9-1-1 and get the person to the nearest hospital. Otherwise, call your doctor.  Reactions should be reported to the Vaccine Adverse Event Reporting System (VAERS). Your doctor should file this report, or you can do it yourself through  the VAERS web site at www.vaers. [facility].gov, or by calling 5-494.152.7889. VAERS does not give medical advice. 6. The National Vaccine Injury Compensation Program 
 
The [facility] Vaccine Injury Compensation Program (VICP) is a federal program that was created to compensate people who may have been injured by certain vaccines. Persons who believe they may have been injured by a vaccine can learn about the program and about filing a claim by calling 6-426.727.2387 or visiting the [employer] website at www.[employer].gov/vaccinecompensation. There is a time limit to file a claim for compensation. 7. How can I learn more?  Ask your healthcare provider. He or she can give you the vaccine package insert or suggest other sources of information.  Call your local or state health department.  Contact the Centers for Disease Control and Prevention (CDC): 
- Call 7-422.145.2928 (1-800-CDC-INFO) or 
- Visit CDCs website at www.cdc.gov/flu Vaccine Information Statement Inactivated Influenza Vaccine 8/7/2015 
42 [facility] [id number] [facility] and [employer] Centers for Disease Control and Prevention Office Use Only [facility]! Dear Sera Rowe: Thank you for requesting a [facility] account. Our records indicate that you already have an active [facility] account. You can access your account anytime at [email or web address] Did you know that you can access your hospital and ER discharge instructions at any time in [facility]? You can also review all of your test results from your hospital stay or ER visit. Additional Information If you have questions, please visit the Frequently Asked Questions section of the [facility] website at [email or web address] Remember, [facility] is NOT to be used for urgent needs. For medical emergencies, dial 911. Now available from your iPhone and Android! Please provide this summary of care documentation to your next provider. Your primary care clinician is listed as Ben Kendall. If you have any questions after today's visit, please call [phone].

## 2017-09-16 LAB
25(OH)D3+25(OH)D2 SERPL-MCNC: 38.1 NG/ML (ref 30–100)
ALBUMIN SERPL-MCNC: 4.4 G/DL (ref 3.5–4.7)
ALBUMIN/GLOB SERPL: 2 {RATIO} (ref 1.2–2.2)
ALP SERPL-CCNC: 97 IU/L (ref 39–117)
ALT SERPL-CCNC: 18 IU/L (ref 0–32)
AST SERPL-CCNC: 21 IU/L (ref 0–40)
BILIRUB SERPL-MCNC: 0.4 MG/DL (ref 0–1.2)
BUN SERPL-MCNC: 17 MG/DL (ref 8–27)
BUN/CREAT SERPL: 23 (ref 12–28)
CALCIUM SERPL-MCNC: 9.8 MG/DL (ref 8.7–10.3)
CHLORIDE SERPL-SCNC: 103 MMOL/L (ref 96–106)
CHOLEST SERPL-MCNC: 150 MG/DL (ref 100–199)
CO2 SERPL-SCNC: 27 MMOL/L (ref 18–29)
CREAT SERPL-MCNC: 0.75 MG/DL (ref 0.57–1)
EST. AVERAGE GLUCOSE BLD GHB EST-MCNC: 134 MG/DL
GLOBULIN SER CALC-MCNC: 2.2 G/DL (ref 1.5–4.5)
GLUCOSE SERPL-MCNC: 113 MG/DL (ref 65–99)
HBA1C MFR BLD: 6.3 % (ref 4.8–5.6)
HDLC SERPL-MCNC: 37 MG/DL
LDLC SERPL CALC-MCNC: 84 MG/DL (ref 0–99)
POTASSIUM SERPL-SCNC: 4.4 MMOL/L (ref 3.5–5.2)
PROT SERPL-MCNC: 6.6 G/DL (ref 6–8.5)
SODIUM SERPL-SCNC: 144 MMOL/L (ref 134–144)
TRIGL SERPL-MCNC: 143 MG/DL (ref 0–149)
VLDLC SERPL CALC-MCNC: 29 MG/DL (ref 5–40)

## 2017-10-06 RX ORDER — SIMVASTATIN 20 MG/1
TABLET, FILM COATED ORAL
Qty: 90 TAB | Refills: 4 | Status: SHIPPED | OUTPATIENT
Start: 2017-10-06 | End: 2019-01-11 | Stop reason: SDUPTHER

## 2017-10-17 RX ORDER — OMEPRAZOLE 40 MG/1
40 CAPSULE, DELAYED RELEASE ORAL DAILY
Qty: 90 CAP | Refills: 2 | Status: SHIPPED | OUTPATIENT
Start: 2017-10-17 | End: 2018-03-02 | Stop reason: ALTCHOICE

## 2018-03-09 ENCOUNTER — TELEPHONE (OUTPATIENT)
Dept: INTERNAL MEDICINE CLINIC | Age: 83
End: 2018-03-09

## 2018-03-09 DIAGNOSIS — K21.00 GASTROESOPHAGEAL REFLUX DISEASE WITH ESOPHAGITIS: ICD-10-CM

## 2018-03-09 RX ORDER — ESOMEPRAZOLE MAGNESIUM 40 MG/1
40 CAPSULE, DELAYED RELEASE ORAL DAILY
Qty: 90 CAP | Refills: 1 | Status: SHIPPED | OUTPATIENT
Start: 2018-03-09 | End: 2018-10-16 | Stop reason: SDUPTHER

## 2018-03-09 NOTE — TELEPHONE ENCOUNTER
Identified patient 2 identifiers verified.  Prilosec not working wants a prescription for [facility]

## 2018-03-09 NOTE — TELEPHONE ENCOUNTER
----- Message from Heri Gordillo sent at 3/9/2018  9:05 AM EST -----  Regarding: Dr. Romero Grit: [phone]  Pt is requesting a call back regarding changing back to \" Nexium 40 mg\" Best contact [phone].         Message copied/pasted from [employer]

## 2018-03-12 NOTE — TELEPHONE ENCOUNTER
ate/time Monday, March 26, 2018 09:00 AM  Patient Primo Ugarte 1935 (58KA F) #[id number]  Department [facility]-MAIN OFFICE-[address]  Appointment type Routine Care  Provider BRI CARMONA        Identified patient 2 identifiers verified. Patient  Accepted appointment above.

## 2018-03-19 ENCOUNTER — LAB ONLY (OUTPATIENT)
Dept: INTERNAL MEDICINE CLINIC | Age: 83
End: 2018-03-19

## 2018-03-19 ENCOUNTER — HOSPITAL ENCOUNTER (OUTPATIENT)
Dept: LAB | Age: 83
Discharge: HOME | End: 2018-03-19
Payer: MEDICARE

## 2018-03-19 DIAGNOSIS — G45.9 TRANSIENT CEREBRAL ISCHEMIA, UNSPECIFIED TYPE: ICD-10-CM

## 2018-03-19 DIAGNOSIS — M54.41 CHRONIC LOW BACK PAIN WITH RIGHT-SIDED SCIATICA, UNSPECIFIED BACK PAIN LATERALITY: ICD-10-CM

## 2018-03-19 DIAGNOSIS — K21.9 GASTROESOPHAGEAL REFLUX DISEASE WITHOUT ESOPHAGITIS: ICD-10-CM

## 2018-03-19 DIAGNOSIS — E78.2 MIXED HYPERLIPIDEMIA: ICD-10-CM

## 2018-03-19 DIAGNOSIS — R73.9 HYPERGLYCEMIA: Primary | ICD-10-CM

## 2018-03-19 DIAGNOSIS — G89.29 CHRONIC LOW BACK PAIN WITH RIGHT-SIDED SCIATICA, UNSPECIFIED BACK PAIN LATERALITY: ICD-10-CM

## 2018-03-19 PROCEDURE — 80053 COMPREHEN METABOLIC PANEL: CPT

## 2018-03-19 PROCEDURE — 85025 COMPLETE CBC W/AUTO DIFF WBC: CPT

## 2018-03-19 PROCEDURE — 36415 COLL VENOUS BLD VENIPUNCTURE: CPT

## 2018-03-19 PROCEDURE — 82306 VITAMIN D 25 HYDROXY: CPT

## 2018-03-19 PROCEDURE — 83036 HEMOGLOBIN GLYCOSYLATED A1C: CPT

## 2018-03-19 PROCEDURE — 80061 LIPID PANEL: CPT

## 2018-03-19 NOTE — TELEPHONE ENCOUNTER
Patient came in to office and states that her prescription was sent to [facility] but they called her to tell her something from it was missing and they need that in order to ship the medication to the patient.

## 2018-03-20 LAB
25(OH)D3+25(OH)D2 SERPL-MCNC: 34.4 NG/ML (ref 30–100)
ALBUMIN SERPL-MCNC: 4.3 G/DL (ref 3.5–4.7)
ALBUMIN/GLOB SERPL: 2 {RATIO} (ref 1.2–2.2)
ALP SERPL-CCNC: 95 IU/L (ref 39–117)
ALT SERPL-CCNC: 24 IU/L (ref 0–32)
AST SERPL-CCNC: 24 IU/L (ref 0–40)
BASOPHILS # BLD AUTO: 0 X10E3/UL (ref 0–0.2)
BASOPHILS NFR BLD AUTO: 1 %
BILIRUB SERPL-MCNC: 0.4 MG/DL (ref 0–1.2)
BUN SERPL-MCNC: 26 MG/DL (ref 8–27)
BUN/CREAT SERPL: 30 (ref 12–28)
CALCIUM SERPL-MCNC: 9.7 MG/DL (ref 8.7–10.3)
CHLORIDE SERPL-SCNC: 103 MMOL/L (ref 96–106)
CHOLEST SERPL-MCNC: 120 MG/DL (ref 100–199)
CO2 SERPL-SCNC: 23 MMOL/L (ref 18–29)
CREAT SERPL-MCNC: 0.86 MG/DL (ref 0.57–1)
EOSINOPHIL # BLD AUTO: 0.1 X10E3/UL (ref 0–0.4)
EOSINOPHIL NFR BLD AUTO: 2 %
ERYTHROCYTE [DISTWIDTH] IN BLOOD BY AUTOMATED COUNT: 13.8 % (ref 12.3–15.4)
EST. AVERAGE GLUCOSE BLD GHB EST-MCNC: 131 MG/DL
GFR SERPLBLD CREATININE-BSD FMLA CKD-EPI: 64 ML/MIN/1.73
GFR SERPLBLD CREATININE-BSD FMLA CKD-EPI: 73 ML/MIN/1.73
GLOBULIN SER CALC-MCNC: 2.1 G/DL (ref 1.5–4.5)
GLUCOSE SERPL-MCNC: 123 MG/DL (ref 65–99)
HBA1C MFR BLD: 6.2 % (ref 4.8–5.6)
HCT VFR BLD AUTO: 42.1 % (ref 34–46.6)
HDLC SERPL-MCNC: 36 MG/DL
HGB BLD-MCNC: 14.1 G/DL (ref 11.1–15.9)
IMM GRANULOCYTES # BLD: 0 X10E3/UL (ref 0–0.1)
IMM GRANULOCYTES NFR BLD: 0 %
LDLC SERPL CALC-MCNC: 60 MG/DL (ref 0–99)
LYMPHOCYTES # BLD AUTO: 2.2 X10E3/UL (ref 0.7–3.1)
LYMPHOCYTES NFR BLD AUTO: 36 %
MCH RBC QN AUTO: 29.9 PG (ref 26.6–33)
MCHC RBC AUTO-ENTMCNC: 33.5 G/DL (ref 31.5–35.7)
MCV RBC AUTO: 89 FL (ref 79–97)
MONOCYTES # BLD AUTO: 0.5 X10E3/UL (ref 0.1–0.9)
MONOCYTES NFR BLD AUTO: 8 %
NEUTROPHILS # BLD AUTO: 3.2 X10E3/UL (ref 1.4–7)
NEUTROPHILS NFR BLD AUTO: 53 %
PLATELET # BLD AUTO: 192 X10E3/UL (ref 150–379)
POTASSIUM SERPL-SCNC: 4.5 MMOL/L (ref 3.5–5.2)
PROT SERPL-MCNC: 6.4 G/DL (ref 6–8.5)
RBC # BLD AUTO: 4.72 X10E6/UL (ref 3.77–5.28)
SODIUM SERPL-SCNC: 143 MMOL/L (ref 134–144)
TRIGL SERPL-MCNC: 118 MG/DL (ref 0–149)
VLDLC SERPL CALC-MCNC: 24 MG/DL (ref 5–40)
WBC # BLD AUTO: 6.1 X10E3/UL (ref 3.4–10.8)

## 2018-03-20 NOTE — TELEPHONE ENCOUNTER
Call completed to [employer], two patient identifiers verified. Prior authorization completed and approved.

## 2018-03-26 ENCOUNTER — OFFICE VISIT (OUTPATIENT)
Dept: INTERNAL MEDICINE CLINIC | Age: 83
End: 2018-03-26

## 2018-03-26 VITALS
HEART RATE: 64 BPM | HEIGHT: 67 IN | OXYGEN SATURATION: 98 % | WEIGHT: 185.5 LBS | BODY MASS INDEX: 29.11 KG/M2 | SYSTOLIC BLOOD PRESSURE: 127 MMHG | RESPIRATION RATE: 18 BRPM | DIASTOLIC BLOOD PRESSURE: 75 MMHG | TEMPERATURE: 98.7 F

## 2018-03-26 DIAGNOSIS — I10 ESSENTIAL HYPERTENSION: ICD-10-CM

## 2018-03-26 DIAGNOSIS — E78.2 MIXED HYPERLIPIDEMIA: Primary | ICD-10-CM

## 2018-03-26 DIAGNOSIS — R73.9 HYPERGLYCEMIA: ICD-10-CM

## 2018-03-26 NOTE — MR AVS SNAPSHOT
[address] [place] [address] 
[phone] Patient: Roshan Stanley MRN: [id number] :1935 Visit Information Date & Time Provider Department Dept. Phone Encounter #  
 3/26/2018  9:00 AM Cyn Ames, [address] [place] [address],[address] [phone] [id number] Follow-up Instructions Return in about 6 months (around 2018) for follow up hyperglycemia. Upcoming Health Maintenance Date Due DTaP/Tdap/Td series (1 - Tdap) 1957 Bone Densitometry (Dexa) Screening 2001 Pneumococcal 65+ Low/Medium Risk (2 of 2 - PCV13) 11/10/2015 GLAUCOMA SCREENING Q2Y 10/6/2017 MEDICARE YEARLY EXAM 2018 Allergies as of 3/26/2018  Review Complete On: 3/26/2018 By: Cyn Ames MD  
  
 Severity Noted Reaction Type Reactions Fosamax [Alendronate]  2011    Nausea Only Current Immunizations  Reviewed on 2017 Name Date Influenza High Dose Vaccine PF 9/15/2017 Influenza Vaccine 11/10/2014, 2013 Influenza Vaccine (Quad) PF 2017 Pneumococcal Polysaccharide (PPSV-23) 11/10/2014 Zoster 2012 Not reviewed this visit You Were Diagnosed With   
  
 Codes Comments Mixed hyperlipidemia    -  Primary ICD-10-CM: W34.9 ICD-9-CM: 272.2 Essential hypertension     ICD-10-CM: I10 
ICD-9-CM: 401.9 Hyperglycemia     ICD-10-CM: R73.9 ICD-9-CM: 790.29 Vitals BP Pulse Temp Resp Height(growth percentile) Weight(growth percentile) 127/75 (BP 1 Location: Left arm, BP Patient Position: Sitting) 64 98.7 °F (37.1 °C) (Oral) 18 5' 6.5\" (1.689 m) 185 lb 8 oz (84.1 kg) SpO2 BMI OB Status Smoking Status 98% 29.49 kg/m2 Postmenopausal Former Smoker Vitals History BMI and BSA Data Body Mass Index Body Surface Area  
 29.49 kg/m 2 1.99 m 2 [phone] Your Updated Medication List  
  
   
This list is accurate as of 3/26/18 10:01 AM.  Always use your most recent med list.  
  
  
  
  
 ASPIR-LOW 81 mg tablet Generic drug:  aspirin delayed-release Take 81 mg by mouth daily. Indications: CEREBRAL THROMBOEMBOLISM PREVENTION, PREVENTION OF TRANSIENT ISCHEMIC ATTACKS  
  
 esomeprazole 40 mg capsule Commonly known as:  NexIUM Take 1 Cap by mouth daily. losartan 50 mg tablet Commonly known as:  COZAAR  
TAKE 1 TABLET DAILY  
  
 simvastatin 20 mg tablet Commonly known as:  ZOCOR  
TAKE 1 TABLET NIGHTLY  
  
 sodium chloride 5 % ophthalmic solution Commonly known as:  EMIR-5 Administer 1 Drop to both eyes as needed. timolol maleate 0.5 % Drpd ophthalmic solution Administer 1 Drop to both eyes daily. VITAMIN D3 2,000 unit Tab Generic drug:  cholecalciferol (vitamin D3) Take 1 Tab by mouth daily. Follow-up Instructions Return in about 6 months (around 9/26/2018) for follow up hyperglycemia. Introducing [facility]! Dear William Palmer: Thank you for requesting a [employer] account. Our records indicate that you already have an active [email or web address] account. You can access your account anytime at [email or web address] Did you know that you can access your hospital and ER discharge instructions at any time in [email or web address]? You can also review all of your test results from your hospital stay or ER visit. Additional Information If you have questions, please visit the Frequently Asked Questions section of the [email or web address] website at [email or web address] Remember, [email or web address] is NOT to be used for urgent needs. For medical emergencies, dial 911. Now available from your iPhone and Android! Please provide this summary of care documentation to your next provider. Your primary care clinician is listed as Danni Sorto. If you have any questions after today's visit, please call [phone].

## 2018-03-28 NOTE — PROGRESS NOTES
SUBJECTIVE:   Ms. Orly Espinoza is a 81 y.o. female who is here for follow up of routine medical issues. Neuropathy: Pt reports she has numbness/tingling in her toes that dissipates with activity. She only notices the discomfort when lying in bed. Pt reports she is making lifestyle changes. She does water aerobics twice a week and walks for 20-30 mins twice a week. She also does balance exercises. She reports she also tries to follow a diabetic diet. Lab results from 3/19/2018 were reviewed with pt today and showed her HgA1c had reduced from 6.3 to 6.2. All other labs were normal, apart from low HDL at 36mg/dL. Pt follows up with her ophthalmologist every 6 months to manage her glaucoma. HLD: Pt is compliant in taking simvastatin. Patient denies abdominal pain, nausea, vomiting, diarrhea, arthralgias/myalgias due to the medication. HTN: Pt is compliant in taking losartan. Patient denies chest pain, CHARLES/SOB, edema, headache, visual changes, dizziness, palpitations or syncope. Pt's BP in the office today was normal at 127/75. At this time, she is otherwise doing well and has brought no other complaints to my attention today. For a list of the medical issues addressed today, see the assessment and plan below. PMH:   Past Medical History:   Diagnosis Date    Adverse effect of anesthesia 2005    took a long time to go to sleep with last colonoscopy    CAD (coronary artery disease) 2010    MI?  Chronic pain     hip rt    Gastrointestinal disorder     GERD    Glaucoma     Hypertension     Ill-defined condition     osteopenia    Ill-defined condition     pulled shoulder lt. and elbow replacement     PSH:  has a past surgical history that includes hx colonoscopy; colonoscopy,diagnostic (9/9/2016); colonoscopy (N/A, 9/9/2016); and hx orthopaedic. All: is allergic to fosamax [alendronate].    MEDS:   Current Outpatient Prescriptions   Medication Sig    esomeprazole ([address]) 40 mg capsule Take 1 Cap by mouth daily.  simvastatin (ZOCOR) 20 mg tablet TAKE 1 TABLET NIGHTLY    losartan (COZAAR) 50 mg tablet TAKE 1 TABLET DAILY    cholecalciferol, vitamin D3, (VITAMIN D3) 2,000 unit tab Take 1 Tab by mouth daily.  sodium chloride (EMIR-5) 5 % ophthalmic solution Administer 1 Drop to both eyes as needed.  timolol maleate 0.5 % DrpD ophthalmic solution Administer 1 Drop to both eyes daily.  aspirin delayed-release (ASPIR-LOW) 81 mg tablet Take 81 mg by mouth daily. Indications: CEREBRAL THROMBOEMBOLISM PREVENTION, PREVENTION OF TRANSIENT ISCHEMIC ATTACKS     No current facility-administered medications for this visit. FH: family history includes Cancer in her father; No Known Problems in her mother. SH:  reports that she quit smoking about 59 years ago. She has never used smokeless tobacco. She reports that she drinks about 0.5 oz of alcohol per week  She reports that she does not use illicit drugs.      Review of Systems - History obtained from the patient  General ROS: no fever, chills, fatigue, body aches  Psychological ROS: no change in anxiety, depression, SI/HI  Ophthalmic ROS: no blurred vision, myopia, double vision  ENT ROS: no dysphagia, otalgia, otorrhea, rhinorrhea, post nasal drip  Respiratory ROS: no cough, shortness of breath, or wheezing  Cardiovascular ROS: no chest pain or dyspnea on exertion  Gastrointestinal ROS: no abdominal pain, change in bowel habits, or black or bloody stools  Genito-Urinary ROS: no frequency, urgency, incontinence, dysuria, hematouria  Musculoskeletal ROS: no arthralagia, myalgia  Neurological ROS: numbness/tingling in toes bilaterally   Dermatological ROS: no rash or lesions    OBJECTIVE:   Vitals:   Visit Vitals    /75 (BP 1 Location: Left arm, BP Patient Position: Sitting)    Pulse 64    Temp 98.7 °F (37.1 °C) (Oral)    Resp 18    Ht 5' 6.5\" (1.689 m)    Wt 185 lb 8 oz (84.1 kg)    SpO2 98%    BMI 29.49 kg/m2 Gen: Pleasant 81 y.o.  female in NAD. HEENT: PERRLA. EOMI. OP moist and pink. Neck: Supple. No LAD. HEART: RRR, No M/G/R.      LUNGS: CTAB No W/R. ABDOMEN: S, NT, ND, BS+. EXTREMITIES: Warm. No C/C/E.    MUSCULOSKELETAL: Normal ROM, muscle strength 5/5 all groups. NEURO: Alert and oriented x 3. Cranial nerves grossly intact. No focal sensory or motor deficits noted. SKIN: Warm. Dry. No rashes or other lesions noted. ASSESSMENT/ PLAN: Diagnoses and all orders for this visit:    1. Mixed hyperlipidemia    2. Essential hypertension    3. Hyperglycemia        ICD-10-CM ICD-9-CM    1. Mixed hyperlipidemia E78.2 272.2    2. Essential hypertension I10 401.9    3. Hyperglycemia R73.9 790.29       1. Hyperlipidemia   Lipid panel shows cholesterol seems to be controlled. I recommended continuing current dose of simvastatin, eating a low fat diet, and increasing exercise. 2. Hypertension  I recommended continuing current dose of losartan, eating a low sodium diet, and increasing exercise. 3. Hyperglycemia   I advised pt to avoid sugars and starches and to increase exercise when possible. Check immunizations hx. Follow-up Disposition:  Return in about 6 months (around 9/26/2018) for follow up hyperglycemia. I have reviewed the patient's medications and risks/side effects/benefits were discussed. Diagnosis(-es) explained to patient and questions answered. Literature provided where appropriate.      Written by Franklin Moore, as dictated by Rebekah Jarvis MD.

## 2018-04-05 NOTE — PROGRESS NOTES
Lipid panel-Normal except for a low hdl  A1c-Your current hgbA1c is lower than your last level of 6.3. Keep up the good work! Continue to work on following a diabetic diet and exercise. Recheck this test: hgbA1c  in  3 months. Continue with current  Medications.   CMP-Normal electrolyte levels except for an elevation in the glucose level, normal renal and liver function

## 2018-10-16 DIAGNOSIS — K21.00 GASTROESOPHAGEAL REFLUX DISEASE WITH ESOPHAGITIS: ICD-10-CM

## 2018-10-16 RX ORDER — ESOMEPRAZOLE MAGNESIUM 40 MG/1
CAPSULE, DELAYED RELEASE ORAL
Qty: 90 CAP | Refills: 1 | Status: SHIPPED | OUTPATIENT
Start: 2018-10-16 | End: 2019-01-11 | Stop reason: SDUPTHER

## 2019-01-11 ENCOUNTER — OFFICE VISIT (OUTPATIENT)
Dept: INTERNAL MEDICINE CLINIC | Age: 84
End: 2019-01-11

## 2019-01-11 ENCOUNTER — HOSPITAL ENCOUNTER (OUTPATIENT)
Dept: LAB | Age: 84
Discharge: HOME | End: 2019-01-11
Payer: MEDICARE

## 2019-01-11 VITALS
DIASTOLIC BLOOD PRESSURE: 71 MMHG | SYSTOLIC BLOOD PRESSURE: 127 MMHG | OXYGEN SATURATION: 97 % | HEART RATE: 73 BPM | WEIGHT: 184 LBS | RESPIRATION RATE: 18 BRPM | BODY MASS INDEX: 28.88 KG/M2 | TEMPERATURE: 97.6 F | HEIGHT: 67 IN

## 2019-01-11 DIAGNOSIS — Z13.39 SCREENING FOR ALCOHOLISM: ICD-10-CM

## 2019-01-11 DIAGNOSIS — R73.09 ELEVATED HEMOGLOBIN A1C: ICD-10-CM

## 2019-01-11 DIAGNOSIS — Z13.6 SCREENING FOR ISCHEMIC HEART DISEASE: ICD-10-CM

## 2019-01-11 DIAGNOSIS — I10 ESSENTIAL HYPERTENSION: ICD-10-CM

## 2019-01-11 DIAGNOSIS — Z23 ENCOUNTER FOR IMMUNIZATION: ICD-10-CM

## 2019-01-11 DIAGNOSIS — G25.81 RESTLESS LEG: ICD-10-CM

## 2019-01-11 DIAGNOSIS — K21.00 GASTROESOPHAGEAL REFLUX DISEASE WITH ESOPHAGITIS: ICD-10-CM

## 2019-01-11 DIAGNOSIS — E78.2 MIXED HYPERLIPIDEMIA: ICD-10-CM

## 2019-01-11 DIAGNOSIS — Z13.31 SCREENING FOR DEPRESSION: ICD-10-CM

## 2019-01-11 DIAGNOSIS — Z00.00 MEDICARE ANNUAL WELLNESS VISIT, SUBSEQUENT: Primary | ICD-10-CM

## 2019-01-11 PROCEDURE — 80053 COMPREHEN METABOLIC PANEL: CPT

## 2019-01-11 PROCEDURE — 83036 HEMOGLOBIN GLYCOSYLATED A1C: CPT

## 2019-01-11 PROCEDURE — 80061 LIPID PANEL: CPT

## 2019-01-11 PROCEDURE — 36415 COLL VENOUS BLD VENIPUNCTURE: CPT

## 2019-01-11 RX ORDER — LOSARTAN POTASSIUM 50 MG/1
TABLET ORAL
Qty: 90 TAB | Refills: 2 | Status: SHIPPED | OUTPATIENT
Start: 2019-01-11 | End: 2019-03-18

## 2019-01-11 RX ORDER — SIMVASTATIN 20 MG/1
TABLET, FILM COATED ORAL
Qty: 90 TAB | Refills: 4 | Status: SHIPPED | OUTPATIENT
Start: 2019-01-11 | End: 2020-06-09

## 2019-01-11 RX ORDER — ROPINIROLE 0.25 MG/1
0.25 TABLET, FILM COATED ORAL EVERY EVENING
Qty: 30 TAB | Refills: 1 | Status: SHIPPED | OUTPATIENT
Start: 2019-01-11 | End: 2020-02-14

## 2019-01-11 RX ORDER — OMEPRAZOLE 40 MG/1
40 CAPSULE, DELAYED RELEASE ORAL DAILY
Qty: 30 CAP | Refills: 1 | Status: SHIPPED | OUTPATIENT
Start: 2019-01-11 | End: 2019-03-03 | Stop reason: SDUPTHER

## 2019-01-11 NOTE — PROGRESS NOTES
This is the Subsequent Medicare Annual Wellness Exam, performed 12 months or more after the Initial AWV or the last Subsequent AWV I have reviewed the patient's medical history in detail and updated the computerized patient record. History Past Medical History:  
Diagnosis Date  Adverse effect of anesthesia 2005  
 took a long time to go to sleep with last colonoscopy  CAD (coronary artery disease) 2010 MI?  Chronic pain   
 hip rt  Gastrointestinal disorder GERD  Glaucoma  Hypertension  Ill-defined condition   
 osteopenia  Ill-defined condition   
 pulled shoulder lt. and elbow replacement Past Surgical History:  
Procedure Laterality Date  COLONOSCOPY N/A 9/9/2016 COLONOSCOPY performed by Rylan Crews MD at [facility] ENDOSCOPY  COLONOSCOPY,DIAGNOSTIC  9/9/2016  HX COLONOSCOPY    
 HX ORTHOPAEDIC    
 left arm surgery elbow replacement, rods in lt arm Current Outpatient Medications Medication Sig Dispense Refill  simvastatin (ZOCOR) 20 mg tablet TAKE 1 TABLET NIGHTLY 90 Tab 4  
 losartan (COZAAR) 50 mg tablet TAKE 1 TABLET DAILY 90 Tab 2  
 omeprazole (PRILOSEC) 40 mg capsule Take 1 Cap by mouth daily. 30 Cap 1  
 rOPINIRole (REQUIP) 0.25 mg tablet Take 1 Tab by mouth every evening. 30 Tab 1  varicella-zoster recombinant, PF, (SHINGRIX, PF,) 50 mcg/0.5 mL susr injection 0.5mL by IntraMUSCular route once now and then repeat in 2-6 months 0.5 mL 1  cholecalciferol, vitamin D3, (VITAMIN D3) 2,000 unit tab Take 1 Tab by mouth daily.  timolol maleate 0.5 % DrpD ophthalmic solution Administer 1 Drop to both eyes daily.  aspirin delayed-release (ASPIR-LOW) 81 mg tablet Take 81 mg by mouth daily. Indications: CEREBRAL THROMBOEMBOLISM PREVENTION, PREVENTION OF TRANSIENT ISCHEMIC ATTACKS Allergies Allergen Reactions  Fosamax [Alendronate] Nausea Only Family History Problem Relation Age of Onset  No Known Problems Mother  Cancer Father Social History Tobacco Use  Smoking status: Former Smoker Last attempt to quit: 1959 Years since quittin.0  Smokeless tobacco: Never Used Substance Use Topics  Alcohol use: Yes Alcohol/week: 0.5 oz Types: 1 Glasses of wine per week Comment: socially Patient Active Problem List  
Diagnosis Code  
 HTN (hypertension) I10  Vertigo R42  GERD (gastroesophageal reflux disease) K21.9  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  
 TIA (transient ischemic attack) G45.9  Hyperlipidemia E78.5  EKG abnormalities R94.31  
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  
 Lumbosacral spondylosis without myelopathy M47.817  Vitreous floaters H43.399  
 Posterior capsular opacification H26.499  
 Primary open angle glaucoma H40.1190  Right foot drop M21.371 Depression Risk Factor Screening: PHQ over the last two weeks 2019 Little interest or pleasure in doing things Not at all Feeling down, depressed, irritable, or hopeless Not at all Total Score PHQ 2 0 Alcohol Risk Factor Screening: You do not drink alcohol or very rarely. Functional Ability and Level of Safety:  
Hearing Loss Hearing is good. The patient wears hearing aids. Activities of Daily Living The home contains: no safety equipment. Patient does total self care Fall Risk Fall Risk Assessment, last 12 mths 2019 Able to walk? Yes Fall in past 12 months? No  
 
 
Abuse Screen Patient is not abused Cognitive Screening Evaluation of Cognitive Function: 
Has your family/caregiver stated any concerns about your memory: no 
Normal 
 
Patient Care Team  
Patient Care Team: 
Edmond Pepe MD as PCP - [facility]) Kita Araya MD (Ophthalmology) Assessment/Plan Education and counseling provided: 
Are appropriate based on today's review and evaluation End-of-Life planning (with patient's consent) Influenza Vaccine Cardiovascular screening blood test 
Screening for glaucoma Diagnoses and all orders for this visit: 
 
1. Medicare annual wellness visit, subsequent 2. Gastroesophageal reflux disease with esophagitis 
-     omeprazole (PRILOSEC) 40 mg capsule; Take 1 Cap by mouth daily. 3. Essential hypertension 
-     losartan (COZAAR) 50 mg tablet; TAKE 1 TABLET DAILY 
-     METABOLIC PANEL, COMPREHENSIVE 4. Mixed hyperlipidemia 
-     simvastatin (ZOCOR) 20 mg tablet; TAKE 1 TABLET NIGHTLY 5. Elevated hemoglobin A1c 
-     HEMOGLOBIN A1C WITH EAG 6. Restless leg 
-     rOPINIRole (REQUIP) 0.25 mg tablet; Take 1 Tab by mouth every evening. 7. Screening for alcoholism -     OR ANNUAL ALCOHOL SCREEN 15 MIN 
 
8. Screening for depression 
-     [facility] 9. Screening for ischemic heart disease -     LIPID PANEL 10. Encounter for immunization -     ADMIN INFLUENZA VIRUS VAC 
-     INFLUENZA VIRUS VACCINE, HIGH DOSE SEASONAL, PRESERVATIVE FREE 
-     varicella-zoster recombinant, PF, (SHINGRIX, PF,) 50 mcg/0.5 mL susr injection; 0.5mL by IntraMUSCular route once now and then repeat in 2-6 months Health Maintenance Due Topic Date Due  
 DTaP/Tdap/Td series (1 - Tdap) 11/08/1957  Shingrix Vaccine Age 50> (1 of 2) 11/08/1986  Bone Densitometry (Dexa) Screening  11/08/2001  Pneumococcal 65+ Low/Medium Risk (2 of 2 - PCV13) 11/10/2015  GLAUCOMA SCREENING Q2Y  10/06/2017  MEDICARE YEARLY EXAM  06/06/2018  Influenza Age 5 to Adult  08/01/2018 PREVENTIVE: 
Colonoscopy: 9/9/16, Dr. Rodriguez Raymond, no f/u indicated Mammogram: declined Dexa: declined Pneumococcal: PPSV-23 on 11/10/14 Shingrix: accepted, prescription provided Flu: 1/11/19 ACP-ACP planning continued today. I explained the purpose of the advance medical directive. Patient expressed interest in reviewing material.  I provided the patient with a \"Your Right to Decide\" booklet, [employer] Kit, and a copy of an Advance Directive.   Patient agrees to providing a copy to the office when available.

## 2019-01-11 NOTE — PROGRESS NOTES
SUBJECTIVE:  
Ms. Genny Caldwell is a 81 y.o. female who is here for follow up of routine medical issues. Pt is not fasting in the office today (breakfast 6 hours prior). Pt c/o continued numbness in the front half of her feet, particularly when she wakes up in the morning. Pt reports the sensation dissipates with activity. She states when she lays down she feels like she needs to keep moving her feet. Pt states the numbness is reduced when she wears shoes instead of barefoot. HTN: Pt's BP in the office today was 127/71. Pt is compliant in taking losartan. Pt notes she has occasional SOB, but only after running up two flights of stairs if her home alarm system is set and she forgot something. Patient denies chest pain, edema, headache, visual changes, dizziness, palpitations or syncope. HLD: Pt is compliant in taking simvastatin. Patient denies abdominal pain, nausea, vomiting, diarrhea, arthralgias/myalgias due to the medication. Pt's last lipid panel was normal except for slightly low  HDL (56). Pt reports she sleeps 6-7 hrs at night and if she is unable to she takes an afternoon nap. Pt reports her mood has been up and stable. She states \"I'm around too many people to be down. \" Pt has her son, daughter-in-law, and grandchildren that is a good support system for her. Pt is still active with water aerobics. She goes at least 2x/week and if she is unable to go she walks. Pt notices if she goes too long or too hard in water aerobics she notices it aggravates her back. Pt notes she now has a hearing aid for her L ear, in addition to her R ear. Pt was referred to balance therapy by her ENT. She has continued PT exercises on her own in the pool. She states \"I can foresee the day I will need a cane. \" 
 
Pt reports she only takes nexium prn if she knows she is going to eat [employer] food. She notes she is occasionally bloated at night but it resolves by the morning. 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 Pt notes a family hx of dementia in her mother (onset at age 80-80). Pt reports she does cross-word puzzles and reads constantly. Pt denies problems urinating, changes in bowel habits. Pt reports she took multiple vacations this year and enjoyed herself. PREVENTIVE: 
Colonoscopy: 9/9/16, Dr. Edison Mills, no f/u indicated Mammogram: declined Dexa: declined Pneumococcal: PPSV-23 on 11/10/14 Shingrix: accepted, prescription provided Flu: 1/11/19 At this time, she is otherwise doing well and has brought no other complaints to my attention today. For a list of the medical issues addressed today, see the assessment and plan below. PMH:  
Past Medical History:  
Diagnosis Date  Adverse effect of anesthesia 2005  
 took a long time to go to sleep with last colonoscopy  CAD (coronary artery disease) 2010 MI?  Chronic pain   
 hip rt  Gastrointestinal disorder GERD  Glaucoma  Hypertension  Ill-defined condition   
 osteopenia  Ill-defined condition   
 pulled shoulder lt. and elbow replacement PSH:  has a past surgical history that includes hx colonoscopy; colonoscopy,diagnostic (9/9/2016); hx orthopaedic; L4-5 DISCECTOMY (Bilateral, 6/19/2017); and COLONOSCOPY (N/A, 9/9/2016). All: is allergic to fosamax [alendronate]. MEDS:  
Current Outpatient Medications Medication Sig  
 simvastatin (ZOCOR) 20 mg tablet TAKE 1 TABLET NIGHTLY  losartan (COZAAR) 50 mg tablet TAKE 1 TABLET DAILY  omeprazole (PRILOSEC) 40 mg capsule Take 1 Cap by mouth daily.  rOPINIRole (REQUIP) 0.25 mg tablet Take 1 Tab by mouth every evening.  varicella-zoster recombinant, PF, (SHINGRIX, PF,) 50 mcg/0.5 mL susr injection 0.5mL by IntraMUSCular route once now and then repeat in 2-6 months  cholecalciferol, vitamin D3, (VITAMIN D3) 2,000 unit tab Take 1 Tab by mouth daily.  timolol maleate 0.5 % DrpD ophthalmic solution Administer 1 Drop to both eyes daily.  aspirin delayed-release (ASPIR-LOW) 81 mg tablet Take 81 mg by mouth daily. Indications: CEREBRAL THROMBOEMBOLISM PREVENTION, PREVENTION OF TRANSIENT ISCHEMIC ATTACKS No current facility-administered medications for this visit. FH: family history includes Cancer in her father; No Known Problems in her mother. SH:  reports that she quit smoking about 60 years ago. she has never used smokeless tobacco. She reports that she drinks about 0.5 oz of alcohol per week. She reports that she does not use drugs. Review of Systems - History obtained from the patient General ROS: no fever, chills, fatigue, body aches Psychological ROS: no change in anxiety, depression, SI/HI Ophthalmic ROS: no blurred vision, myopia, double vision ENT ROS: no dysphagia, otalgia, otorrhea, rhinorrhea, post nasal drip Respiratory ROS: no cough, shortness of breath, or wheezing Cardiovascular ROS: no chest pain or dyspnea on exertion Gastrointestinal ROS: no abdominal pain, change in bowel habits, or black or bloody stools Genito-Urinary ROS: no frequency, urgency, incontinence, dysuria, hematuria Musculoskeletal ROS: no arthralgia, myalgia Neurological ROS: numbness in feet no headaches, dizziness, lightheadedness, tremors, seizures Dermatological ROS: no rash or lesions OBJECTIVE:  
Vitals:  
Visit Vitals /71 (BP 1 Location: Left arm, BP Patient Position: Sitting) Pulse 73 Temp 97.6 °F (36.4 °C) (Oral) Resp 18 Ht 5' 6.5\" (1.689 m) Wt 184 lb (83.5 kg) SpO2 97% BMI 29.25 kg/m² Gen: Pleasant 81 y.o.  female in NAD. HEENT: PERRLA. EOMI. OP moist and pink. Neck: Supple. No LAD. HEART: RRR, No M/G/R.     
LUNGS: CTAB No W/R. ABDOMEN: S, NT, ND, BS+. EXTREMITIES: Warm. No C/C/E.   
MUSCULOSKELETAL: Normal ROM, muscle strength 5/5 all groups. NEURO: Alert and oriented x 3. Cranial nerves grossly intact. No focal sensory or motor deficits noted. SKIN: Warm. Dry. No rashes or other lesions noted. ASSESSMENT/ PLAN: Diagnoses and all orders for this visit: 
 
1. Medicare annual wellness visit, subsequent 2. Gastroesophageal reflux disease with esophagitis 
-     omeprazole (PRILOSEC) 40 mg capsule; Take 1 Cap by mouth daily. 3. Essential hypertension 
-     losartan (COZAAR) 50 mg tablet; TAKE 1 TABLET DAILY 
-     METABOLIC PANEL, COMPREHENSIVE 4. Mixed hyperlipidemia 
-     simvastatin (ZOCOR) 20 mg tablet; TAKE 1 TABLET NIGHTLY 5. Elevated hemoglobin A1c 
-     HEMOGLOBIN A1C WITH EAG 6. Restless leg 
-     rOPINIRole (REQUIP) 0.25 mg tablet; Take 1 Tab by mouth every evening. 7. Screening for alcoholism -     [place] ANNUAL ALCOHOL SCREEN 15 MIN 
 
8. Screening for depression 
-     Kathy Cummings 9. Screening for ischemic heart disease -     LIPID PANEL 10. Encounter for immunization -     ADMIN INFLUENZA VIRUS VAC 
-     INFLUENZA VIRUS VACCINE, HIGH DOSE SEASONAL, PRESERVATIVE FREE 
-     varicella-zoster recombinant, PF, (SHINGRIX, PF,) 50 mcg/0.5 mL susr injection; 0.5mL by IntraMUSCular route once now and then repeat in 2-6 months ICD-10-CM ICD-9-CM 1. Medicare annual wellness visit, subsequent Z00.00 V70.0 2. Gastroesophageal reflux disease with esophagitis K21.0 530.11 omeprazole (PRILOSEC) 40 mg capsule 3. Essential hypertension I10 401.9 losartan (COZAAR) 50 mg tablet METABOLIC PANEL, COMPREHENSIVE 4. Mixed hyperlipidemia E78.2 272.2 simvastatin (ZOCOR) 20 mg tablet CANCELED: LIPID PANEL 5. Elevated hemoglobin A1c R73.09 790.29 HEMOGLOBIN A1C WITH EAG 6. Restless leg G25.81 333.94 rOPINIRole (REQUIP) 0.25 mg tablet 7. Screening for alcoholism Z13.39 V79.1 [place] ANNUAL ALCOHOL SCREEN 15 MIN  
8. Screening for depression Z13.31 V79.0 Kathy Cummings 9. Screening for ischemic heart disease Z13.6 V81.0 LIPID PANEL 10. Encounter for immunization Z23 V03.89 ADMIN INFLUENZA VIRUS VAC INFLUENZA VIRUS VACCINE, HIGH DOSE SEASONAL, PRESERVATIVE FREE  
   varicella-zoster recombinant, PF, (SHINGRIX, PF,) 50 mcg/0.5 mL susr injection 1. Medicare Annual Wellness Visit See attached note. 2. Hypertension BP seems to be well controlled. I recommended continuing current dose of losartan, eating a low sodium diet, and increasing exercise. Recheck CMP. 3. GERD I advised pt to continue with prilosec prn and to avoid GERD-trigger foods. 4. Mixed Hyperlipidemia Lipid panel shows cholesterol seems to be controlled. I recommended continuing current dose of simvastatin, eating a low fat diet, and increasing exercise. Recheck lipid panel. 5. Elevated hemoglobin A1c I advised pt to avoid sugar and starches and to increase exercise when possible. Recheck A1c. 
 
6. Restless leg I prescribed requip to help alleviate pt's numbness. I advised pt to follow up with any side effects. 7. Screening for alcoholism 8. Screening for depression 9. Screening for Ischemic Heart Disease Recheck lipid panel. 10. Encounter for immunization Pt's flu shot was administered in the office today. Pt was provided a prescription for shingrix and will f/u at the pharmacy. Follow-up Disposition: 
Return in about 6 months (around 7/11/2019) for follow up . I have reviewed the patient's medications and risks/side effects/benefits were discussed. Diagnosis(-es) explained to patient and questions answered. Literature provided where appropriate.   
 
Written by Fili Powell, as dictated by Eugenie Pace MD.

## 2019-01-11 NOTE — PATIENT INSTRUCTIONS
Medicare Wellness Visit, Female The best way to live healthy is to have a lifestyle where you eat a well-balanced diet, exercise regularly, limit alcohol use, and quit all forms of tobacco/nicotine, if applicable. Regular preventive services are another way to keep healthy. Preventive services (vaccines, screening tests, monitoring & exams) can help personalize your care plan, which helps you manage your own care. Screening tests can find health problems at the earliest stages, when they are easiest to treat. [facility] follows the current, evidence-based guidelines published by the [facility] Marlon Rula ([facility]) when recommending preventive services for our patients. Because we follow these guidelines, sometimes recommendations change over time as research supports it. (For example, mammograms used to be recommended annually. Even though Medicare will still pay for an annual mammogram, the newer guidelines recommend a mammogram every two years for women of average risk.) Of course, you and your doctor may decide to screen more often for some diseases, based on your risk and your health status. Preventive services for you include: - Medicare offers their members a free annual wellness visit, which is time for you and your primary care provider to discuss and plan for your preventive service needs. Take advantage of this benefit every year! 
-All adults over the age of 72 should receive the recommended pneumonia vaccines. Current USPSTF guidelines recommend a series of two vaccines for the best pneumonia protection.  
-All adults should have a flu vaccine yearly and a tetanus vaccine every 10 years. All adults age 61 and older should receive a shingles vaccine once in their lifetime.   
-A bone mass density test is recommended when a woman turns 65 to screen for osteoporosis. This test is only recommended one time, as a screening. Some providers will use this same test as a disease monitoring tool if you already have osteoporosis. -All adults age 38-68 who are overweight should have a diabetes screening test once every three years.  
-Other screening tests and preventive services for persons with diabetes include: an eye exam to screen for diabetic retinopathy, a kidney function test, a foot exam, and stricter control over your cholesterol.  
-Cardiovascular screening for adults with routine risk involves an electrocardiogram (ECG) at intervals determined by your doctor.  
-Colorectal cancer screenings should be done for adults age 54-65 with no increased risk factors for colorectal cancer. There are a number of acceptable methods of screening for this type of cancer. Each test has its own benefits and drawbacks. Discuss with your doctor what is most appropriate for you during your annual wellness visit. The different tests include: colonoscopy (considered the best screening method), a fecal occult blood test, a fecal DNA test, and sigmoidoscopy. -Breast cancer screenings are recommended every other year for women of normal risk, age 54-69. 
-Cervical cancer screenings for women over age 72 are only recommended with certain risk factors.  
-All adults born between [facility] should be screened once for Hepatitis C. Here is a list of your current Health Maintenance items (your personalized list of preventive services) with a due date: 
Health Maintenance Due Topic Date Due  
 DTaP/Tdap/Td  (1 - Tdap) 11/08/1957  Shingles Vaccine (1 of 2) 11/08/1986  Bone Mineral Density   11/08/2001  Pneumococcal Vaccine (2 of 2 - PCV13) 11/10/2015  Glaucoma Screening   10/06/2017 [address] [place] [address] Annual Well Visit  06/06/2018  Flu Vaccine  08/01/2018

## 2019-01-12 LAB
ALBUMIN SERPL-MCNC: 4.6 G/DL (ref 3.5–4.7)
ALBUMIN/GLOB SERPL: 1.9 {RATIO} (ref 1.2–2.2)
ALP SERPL-CCNC: 111 IU/L (ref 39–117)
ALT SERPL-CCNC: 18 IU/L (ref 0–32)
AST SERPL-CCNC: 20 IU/L (ref 0–40)
BILIRUB SERPL-MCNC: 0.4 MG/DL (ref 0–1.2)
BUN SERPL-MCNC: 20 MG/DL (ref 8–27)
BUN/CREAT SERPL: 26 (ref 12–28)
CALCIUM SERPL-MCNC: 10 MG/DL (ref 8.7–10.3)
CHLORIDE SERPL-SCNC: 106 MMOL/L (ref 96–106)
CHOLEST SERPL-MCNC: 172 MG/DL (ref 100–199)
CO2 SERPL-SCNC: 24 MMOL/L (ref 20–29)
CREAT SERPL-MCNC: 0.76 MG/DL (ref 0.57–1)
EST. AVERAGE GLUCOSE BLD GHB EST-MCNC: 137 MG/DL
GLOBULIN SER CALC-MCNC: 2.4 G/DL (ref 1.5–4.5)
GLUCOSE SERPL-MCNC: 106 MG/DL (ref 65–99)
HBA1C MFR BLD: 6.4 % (ref 4.8–5.6)
HDLC SERPL-MCNC: 41 MG/DL
LDLC SERPL CALC-MCNC: 98 MG/DL (ref 0–99)
POTASSIUM SERPL-SCNC: 4.5 MMOL/L (ref 3.5–5.2)
PROT SERPL-MCNC: 7 G/DL (ref 6–8.5)
SODIUM SERPL-SCNC: 146 MMOL/L (ref 134–144)
TRIGL SERPL-MCNC: 167 MG/DL (ref 0–149)
VLDLC SERPL CALC-MCNC: 33 MG/DL (ref 5–40)

## 2019-01-14 NOTE — PROGRESS NOTES
A1c-Your current hgbA1c is higher than your last level of 6.2 Work on following a diabetic diet and exercise as tolerated. Recheck this test: hgbA1c  in  3 months. Continue with current  Medications. CMP-Normal electrolyte levels, renal, and liver function. Lipid-. Your current lab results reveal a elevated triglyceride level. Your total cholesterol should be under 200, triglycerides under 150, and your ldl under 100. Work on following a low fat diet and exercise at least three times a week.

## 2019-03-03 DIAGNOSIS — K21.00 GASTROESOPHAGEAL REFLUX DISEASE WITH ESOPHAGITIS: ICD-10-CM

## 2019-03-04 RX ORDER — OMEPRAZOLE 40 MG/1
CAPSULE, DELAYED RELEASE ORAL
Qty: 30 CAP | Refills: 1 | Status: SHIPPED | OUTPATIENT
Start: 2019-03-04 | End: 2019-04-26 | Stop reason: SDUPTHER

## 2019-04-02 ENCOUNTER — TELEPHONE (OUTPATIENT)
Dept: INTERNAL MEDICINE CLINIC | Age: 84
End: 2019-04-02

## 2019-04-02 DIAGNOSIS — I10 ESSENTIAL HYPERTENSION: Primary | ICD-10-CM

## 2019-04-02 NOTE — TELEPHONE ENCOUNTER
----- Message from Harpreet King sent at 4/2/2019  8:30 AM EDT -----  Regarding: Dr. Gerry Zamora  The patient is requesting a call back in regards to not receiving a response to her message left last Thursday in regards to a bad reaction to the new blood pressure that she was prescribed.  (C)[phone] (P)[phone]      Copy/paste envera

## 2019-04-02 NOTE — TELEPHONE ENCOUNTER
Identified patient 2 identifiers verified. Patient  Started on Avapro 160/88 up to 170. Patient voiding frequently. Patient was told by [facility] that  Losartan from another , and could send  The Losartan to her if it is prescribed by her PCP.  Patient reports that  The Losartan worked better she was taking  Losartan 50 mg. paatient wants to stop the Irbesartan

## 2019-04-03 RX ORDER — LOSARTAN POTASSIUM 50 MG/1
50 TABLET ORAL DAILY
Qty: 90 TAB | Refills: 1 | Status: SHIPPED | OUTPATIENT
Start: 2019-04-03 | End: 2019-10-02 | Stop reason: SDUPTHER

## 2019-04-26 DIAGNOSIS — K21.00 GASTROESOPHAGEAL REFLUX DISEASE WITH ESOPHAGITIS: ICD-10-CM

## 2019-04-26 RX ORDER — OMEPRAZOLE 40 MG/1
CAPSULE, DELAYED RELEASE ORAL
Qty: 60 CAP | Refills: 0 | Status: SHIPPED | OUTPATIENT
Start: 2019-04-26 | End: 2019-06-13 | Stop reason: SDUPTHER

## 2019-06-13 DIAGNOSIS — K21.00 GASTROESOPHAGEAL REFLUX DISEASE WITH ESOPHAGITIS: ICD-10-CM

## 2019-06-13 RX ORDER — OMEPRAZOLE 40 MG/1
CAPSULE, DELAYED RELEASE ORAL
Qty: 60 CAP | Refills: 0 | Status: SHIPPED | OUTPATIENT
Start: 2019-06-13 | End: 2019-08-30 | Stop reason: SDUPTHER

## 2019-08-30 DIAGNOSIS — K21.00 GASTROESOPHAGEAL REFLUX DISEASE WITH ESOPHAGITIS: ICD-10-CM

## 2019-08-30 RX ORDER — OMEPRAZOLE 40 MG/1
CAPSULE, DELAYED RELEASE ORAL
Qty: 60 CAP | Refills: 6 | Status: SHIPPED | OUTPATIENT
Start: 2019-08-30 | End: 2020-09-27

## 2019-09-27 ENCOUNTER — OFFICE VISIT (OUTPATIENT)
Dept: INTERNAL MEDICINE CLINIC | Age: 84
End: 2019-09-27

## 2019-09-27 VITALS
DIASTOLIC BLOOD PRESSURE: 79 MMHG | RESPIRATION RATE: 18 BRPM | SYSTOLIC BLOOD PRESSURE: 143 MMHG | HEART RATE: 90 BPM | TEMPERATURE: 98.2 F | HEIGHT: 67 IN | BODY MASS INDEX: 28.82 KG/M2 | OXYGEN SATURATION: 95 % | WEIGHT: 183.6 LBS

## 2019-09-27 DIAGNOSIS — M62.838 NECK MUSCLE SPASM: Primary | ICD-10-CM

## 2019-09-27 RX ORDER — TIZANIDINE 2 MG/1
2 TABLET ORAL
Qty: 60 TAB | Refills: 1 | Status: SHIPPED | OUTPATIENT
Start: 2019-09-27 | End: 2020-02-14

## 2019-09-27 NOTE — PATIENT INSTRUCTIONS
Office Policies    Phone calls/patient messages:            Please allow up to 24 hours for someone in the office to contact you about your call or message. Be mindful your provider may be out of the office or your message may require further review. We encourage you to use [employer] for your messages as this is a faster, more efficient way to communicate with our office                         Medication Refills:            Prescription medications require 48-72 business hours to process. We encourage you to use [employer] for your refills. For controlled medications: Please allow 72 business hours to process. Certain medications may require you to  a written prescription at our office. NO narcotic/controlled medications will be prescribed after 4pm Monday through Friday or on weekends              Form/Paperwork Completion:            Please note a $25 fee may incur for all paperwork for completed by our providers. We ask that you allow 7-10 business days. Pre-payment is due prior to picking up/faxing the completed form. You may also download your forms to [employer] to have your doctor print off. Neck: Exercises  Introduction  Here are some examples of exercises for you to try. The exercises may be suggested for a condition or for rehabilitation. Start each exercise slowly. Ease off the exercises if you start to have pain. You will be told when to start these exercises and which ones will work best for you. How to do the exercises  Neck stretch    1. This stretch works best if you keep your shoulder down as you lean away from it. To help you remember to do this, start by relaxing your shoulders and lightly holding on to your thighs or your chair. 2. Tilt your head toward your shoulder and hold for 15 to 30 seconds. Let the weight of your head stretch your muscles. 3. If you would like a little added stretch, use your hand to gently and steadily pull your head toward your shoulder. For example, keeping your right shoulder down, lean your head to the left. 4. Repeat 2 to 4 times toward each shoulder. Diagonal neck stretch    1. Turn your head slightly toward the direction you will be stretching, and tilt your head diagonally toward your chest and hold for 15 to 30 seconds. 2. If you would like a little added stretch, use your hand to gently and steadily pull your head forward on the diagonal.  3. Repeat 2 to 4 times toward each side. Dorsal glide stretch    1. Sit or stand tall and look straight ahead. 2. Slowly tuck your chin as you glide your head backward over your body  3. Hold for a count of 6, and then relax for up to 10 seconds. 4. Repeat 8 to 12 times. Chest and shoulder stretch    1. Sit or stand tall and glide your head backward as in the dorsal glide stretch. 2. Raise both arms so that your hands are next to your ears. 3. Take a deep breath, and as you breathe out, lower your elbows down and behind your back. You will feel your shoulder blades slide down and together, and at the same time you will feel a stretch across your chest and the front of your shoulders. 4. Hold for about 6 seconds, and then relax for up to 10 seconds. 5. Repeat 8 to 12 times. Strengthening: Hands on head    1. Move your head backward, forward, and side to side against gentle pressure from your hands, holding each position for about 6 seconds. 2. Repeat 8 to 12 times. Follow-up care is a key part of your treatment and safety. Be sure to make and go to all appointments, and call your doctor if you are having problems. It's also a good idea to know your test results and keep a list of the medicines you take. Where can you learn more? Go to http://cheryl-david.info/. Enter P975 in the search box to learn more about \"Neck: Exercises. \"  Current as of: June 26, 2019  Content Version: 12.2  © 1808-8506 [employer].  Care instructions adapted under license by [employer] (which disclaims liability or warranty for this information). If you have questions about a medical condition or this instruction, always ask your healthcare professional. [address] any warranty or liability for your use of this information.

## 2019-09-27 NOTE — PROGRESS NOTES
1. Have you been to the ER, urgent care clinic since your last visit? Hospitalized since your last visit? [facility] urgent care    2. Have you seen or consulted any other health care providers outside of the [address] [place] [address] since your last visit? Include any pap smears or colon screening.  no

## 2019-09-27 NOTE — PROGRESS NOTES
SUBJECTIVE  Ms. Neel Gonzalez presents today acutely for     Chief Complaint   Patient presents with    Neck Pain     pt here today c/o neck pain to R side of neck radiating in R shoulder x 6 weeks; pt takes advil and or tylenol; pt was seen at urgent are at [facility] relieves her pain. Takes ii bid. OBJECTIVE  Visit Vitals  /79 (BP 1 Location: Left arm, BP Patient Position: Sitting)   Pulse 90   Temp 98.2 °F (36.8 °C) (Oral)   Resp 18   Ht 5' 6.5\" (1.689 m)   Wt 183 lb 9.6 oz (83.3 kg)   SpO2 95%   BMI 29.19 kg/m²     Gen: Pleasant 81 y.o.  female in NAD.   HEENT: PERRLA. EOMI. OP moist and pink.  Neck: See has R posterior neck tenderness and palpable spasm. HEART: RRR, No M/G/R.   LUNGS: CTAB    ASSESSMENT / PLAN    ICD-10-CM ICD-9-CM    1. Neck muscle spasm M62.838 728.85 tiZANidine (ZANAFLEX) 2 mg tablet      REFERRAL TO PHYSICAL THERAPY     May continue Advil. I have reviewed with the patient details of the assessment and plan and all questions were answered. Relevant patient education was performed.

## 2019-10-02 DIAGNOSIS — I10 ESSENTIAL HYPERTENSION: ICD-10-CM

## 2019-10-06 RX ORDER — LOSARTAN POTASSIUM 50 MG/1
TABLET ORAL
Qty: 90 TAB | Refills: 4 | Status: SHIPPED | OUTPATIENT
Start: 2019-10-06 | End: 2020-06-22 | Stop reason: SDUPTHER

## 2019-10-21 ENCOUNTER — HOSPITAL ENCOUNTER (EMERGENCY)
Age: 84
Discharge: HOME | End: 2019-10-21
Attending: EMERGENCY MEDICINE
Payer: MEDICARE

## 2019-10-21 ENCOUNTER — OFFICE VISIT (OUTPATIENT)
Dept: INTERNAL MEDICINE CLINIC | Age: 84
End: 2019-10-21

## 2019-10-21 ENCOUNTER — HOSPITAL ENCOUNTER (OUTPATIENT)
Dept: LAB | Age: 84
Discharge: HOME | End: 2019-10-21
Payer: MEDICARE

## 2019-10-21 ENCOUNTER — TELEPHONE (OUTPATIENT)
Dept: INTERNAL MEDICINE CLINIC | Age: 84
End: 2019-10-21

## 2019-10-21 VITALS
OXYGEN SATURATION: 94 % | SYSTOLIC BLOOD PRESSURE: 131 MMHG | RESPIRATION RATE: 18 BRPM | DIASTOLIC BLOOD PRESSURE: 72 MMHG | TEMPERATURE: 102.3 F | BODY MASS INDEX: 28.72 KG/M2 | HEART RATE: 103 BPM | WEIGHT: 183 LBS | HEIGHT: 67 IN

## 2019-10-21 VITALS
SYSTOLIC BLOOD PRESSURE: 135 MMHG | TEMPERATURE: 101 F | HEIGHT: 66 IN | WEIGHT: 181.88 LBS | BODY MASS INDEX: 29.23 KG/M2 | RESPIRATION RATE: 21 BRPM | HEART RATE: 90 BPM | OXYGEN SATURATION: 97 % | DIASTOLIC BLOOD PRESSURE: 59 MMHG

## 2019-10-21 DIAGNOSIS — R35.0 URINARY FREQUENCY: Primary | ICD-10-CM

## 2019-10-21 DIAGNOSIS — R30.0 DYSURIA: ICD-10-CM

## 2019-10-21 DIAGNOSIS — R50.9 FEVER AND CHILLS: ICD-10-CM

## 2019-10-21 DIAGNOSIS — N30.00 ACUTE CYSTITIS WITHOUT HEMATURIA: Primary | ICD-10-CM

## 2019-10-21 LAB
ALBUMIN SERPL-MCNC: 3.8 G/DL (ref 3.5–5)
ALBUMIN/GLOB SERPL: 0.9 {RATIO} (ref 1.1–2.2)
ALP SERPL-CCNC: 90 U/L (ref 45–117)
ALT SERPL-CCNC: 18 U/L (ref 12–78)
ANION GAP SERPL CALC-SCNC: 8 MMOL/L (ref 5–15)
APPEARANCE UR: ABNORMAL
AST SERPL-CCNC: 12 U/L (ref 15–37)
BACTERIA URNS QL MICRO: ABNORMAL /HPF
BASOPHILS # BLD: 0 K/UL (ref 0–0.1)
BASOPHILS NFR BLD: 0 % (ref 0–1)
BILIRUB SERPL-MCNC: 0.8 MG/DL (ref 0.2–1)
BILIRUB UR QL STRIP: NEGATIVE
BILIRUB UR QL: NEGATIVE
BUN SERPL-MCNC: 28 MG/DL (ref 6–20)
BUN/CREAT SERPL: 26 (ref 12–20)
CALCIUM SERPL-MCNC: 9.8 MG/DL (ref 8.5–10.1)
CHLORIDE SERPL-SCNC: 98 MMOL/L (ref 97–108)
CO2 SERPL-SCNC: 28 MMOL/L (ref 21–32)
COLOR UR: ABNORMAL
CREAT SERPL-MCNC: 1.08 MG/DL (ref 0.55–1.02)
DIFFERENTIAL METHOD BLD: ABNORMAL
EOSINOPHIL # BLD: 0 K/UL (ref 0–0.4)
EOSINOPHIL NFR BLD: 0 % (ref 0–7)
EPITH CASTS URNS QL MICRO: ABNORMAL /LPF
ERYTHROCYTE [DISTWIDTH] IN BLOOD BY AUTOMATED COUNT: 12.4 % (ref 11.5–14.5)
GLOBULIN SER CALC-MCNC: 4.2 G/DL (ref 2–4)
GLUCOSE SERPL-MCNC: 119 MG/DL (ref 65–100)
GLUCOSE UR STRIP.AUTO-MCNC: NEGATIVE MG/DL
GLUCOSE UR-MCNC: NEGATIVE MG/DL
HCT VFR BLD AUTO: 44.7 % (ref 35–47)
HGB BLD-MCNC: 14.4 G/DL (ref 11.5–16)
HGB UR QL STRIP: ABNORMAL
HYALINE CASTS URNS QL MICRO: ABNORMAL /LPF (ref 0–5)
IMM GRANULOCYTES # BLD AUTO: 0.1 K/UL (ref 0–0.04)
IMM GRANULOCYTES NFR BLD AUTO: 1 % (ref 0–0.5)
KETONES P FAST UR STRIP-MCNC: NEGATIVE MG/DL
KETONES UR QL STRIP.AUTO: NEGATIVE MG/DL
LACTATE SERPL-SCNC: 1.2 MMOL/L (ref 0.4–2)
LEUKOCYTE ESTERASE UR QL STRIP.AUTO: ABNORMAL
LYMPHOCYTES # BLD: 1.7 K/UL (ref 0.8–3.5)
LYMPHOCYTES NFR BLD: 17 % (ref 12–49)
MCH RBC QN AUTO: 29.8 PG (ref 26–34)
MCHC RBC AUTO-ENTMCNC: 32.2 G/DL (ref 30–36.5)
MCV RBC AUTO: 92.5 FL (ref 80–99)
MONOCYTES # BLD: 0.7 K/UL (ref 0–1)
MONOCYTES NFR BLD: 7 % (ref 5–13)
NEUTS SEG # BLD: 7.9 K/UL (ref 1.8–8)
NEUTS SEG NFR BLD: 75 % (ref 32–75)
NITRITE UR QL STRIP.AUTO: NEGATIVE
NRBC # BLD: 0 K/UL (ref 0–0.01)
NRBC BLD-RTO: 0 PER 100 WBC
PH UR STRIP: 5.5 [PH] (ref 4.6–8)
PH UR STRIP: 5.5 [PH] (ref 5–8)
PLATELET # BLD AUTO: 200 K/UL (ref 150–400)
PMV BLD AUTO: 10.1 FL (ref 8.9–12.9)
POTASSIUM SERPL-SCNC: 3.5 MMOL/L (ref 3.5–5.1)
PROT SERPL-MCNC: 8 G/DL (ref 6.4–8.2)
PROT UR QL STRIP: NORMAL
PROT UR STRIP-MCNC: 100 MG/DL
RBC # BLD AUTO: 4.83 M/UL (ref 3.8–5.2)
RBC #/AREA URNS HPF: ABNORMAL /HPF (ref 0–5)
SODIUM SERPL-SCNC: 134 MMOL/L (ref 136–145)
SP GR UR REFRACTOMETRY: 1.01 (ref 1–1.03)
SP GR UR STRIP: 1.02 (ref 1–1.03)
UA UROBILINOGEN AMB POC: NORMAL (ref 0.2–1)
URINALYSIS CLARITY POC: NORMAL
URINALYSIS COLOR POC: NORMAL
URINE BLOOD POC: NORMAL
URINE LEUKOCYTES POC: NORMAL
URINE NITRITES POC: NEGATIVE
UROBILINOGEN UR QL STRIP.AUTO: 1 EU/DL (ref 0.2–1)
WBC # BLD AUTO: 10.4 K/UL (ref 3.6–11)
WBC URNS QL MICRO: >100 /HPF (ref 0–4)

## 2019-10-21 PROCEDURE — 74011250636 HC RX REV CODE- 250/636: Performed by: EMERGENCY MEDICINE

## 2019-10-21 PROCEDURE — 36415 COLL VENOUS BLD VENIPUNCTURE: CPT

## 2019-10-21 PROCEDURE — 87086 URINE CULTURE/COLONY COUNT: CPT

## 2019-10-21 PROCEDURE — 74011000258 HC RX REV CODE- 258: Performed by: EMERGENCY MEDICINE

## 2019-10-21 PROCEDURE — 99284 EMERGENCY DEPT VISIT MOD MDM: CPT

## 2019-10-21 PROCEDURE — 87186 SC STD MICRODIL/AGAR DIL: CPT

## 2019-10-21 PROCEDURE — 87077 CULTURE AEROBIC IDENTIFY: CPT

## 2019-10-21 PROCEDURE — 81001 URINALYSIS AUTO W/SCOPE: CPT

## 2019-10-21 PROCEDURE — 87088 URINE BACTERIA CULTURE: CPT

## 2019-10-21 PROCEDURE — 96365 THER/PROPH/DIAG IV INF INIT: CPT

## 2019-10-21 PROCEDURE — 80053 COMPREHEN METABOLIC PANEL: CPT

## 2019-10-21 PROCEDURE — 87040 BLOOD CULTURE FOR BACTERIA: CPT

## 2019-10-21 PROCEDURE — 85025 COMPLETE CBC W/AUTO DIFF WBC: CPT

## 2019-10-21 PROCEDURE — 83605 ASSAY OF LACTIC ACID: CPT

## 2019-10-21 RX ORDER — CEPHALEXIN 500 MG/1
500 CAPSULE ORAL 3 TIMES DAILY
Qty: 21 CAP | Refills: 0 | Status: SHIPPED | OUTPATIENT
Start: 2019-10-21 | End: 2019-10-28

## 2019-10-21 RX ADMIN — CEFTRIAXONE 1 G: 1 INJECTION, POWDER, FOR SOLUTION INTRAMUSCULAR; INTRAVENOUS at 22:49

## 2019-10-21 NOTE — PROGRESS NOTES
SUBJECTIVE:   Ms. Freya Vergara is a 81 y.o. female who is here c/o urinary frequency and dizziness. Pt presents in a wheelchair, or ambulatory with cane, with her friend. Urinary Frequency: Pt first noticed the urinary frequency Saturday afternoon and assumed it was a UTI. She describes her urine as milky and reports that she would experience suprapubic and RLQ pain during urination. She states that she had no appetite over the weekend and continued to drink water. Pt endorses fever, chills, dizziness, decreased appetite, minor lower back discomfort and urinary frequency. She notes that she started feeling better today, despite noticing a fever of 100 F last night and 102.3 F today in the office. Pt denies CP. Pt reports that the PT recommended by Dr. Corey Reynoso was helpful for the neck pain she has been experiencing. She notes that the pain has improved, but is still present. At this time, she is otherwise doing well and has brought no other complaints to my attention today. PMH:   Past Medical History:   Diagnosis Date    Adverse effect of anesthesia 2005    took a long time to go to sleep with last colonoscopy    CAD (coronary artery disease) 2010    MI?  Chronic pain     hip rt    Gastrointestinal disorder     GERD    Glaucoma     Hypertension     Ill-defined condition     osteopenia    Ill-defined condition     pulled shoulder lt. and elbow replacement     PSH:  has a past surgical history that includes hx colonoscopy; colonoscopy,diagnostic (9/9/2016); colonoscopy (N/A, 9/9/2016); and hx orthopaedic. All: is allergic to fosamax [alendronate]. MEDS:   Current Outpatient Medications   Medication Sig    losartan (COZAAR) 50 mg tablet TAKE 1 TABLET DAILY    omeprazole (PRILOSEC) 40 mg capsule TAKE 1 CAPSULE DAILY    simvastatin (ZOCOR) 20 mg tablet TAKE 1 TABLET NIGHTLY    rOPINIRole (REQUIP) 0.25 mg tablet Take 1 Tab by mouth every evening.     cholecalciferol, vitamin D3, (VITAMIN D3) 2,000 unit tab Take 1 Tab by mouth daily.  timolol maleate 0.5 % DrpD ophthalmic solution Administer 1 Drop to both eyes daily.  aspirin delayed-release (ASPIR-LOW) 81 mg tablet Take 81 mg by mouth daily. Indications: CEREBRAL THROMBOEMBOLISM PREVENTION, PREVENTION OF TRANSIENT ISCHEMIC ATTACKS    tiZANidine (ZANAFLEX) 2 mg tablet Take 1 Tab by mouth four (4) times daily (with meals). (Patient not taking: Reported on 10/21/2019)     No current facility-administered medications for this visit. FH: family history includes Cancer in her father; No Known Problems in her mother. SH:  reports that she quit smoking about 60 years ago. She has never used smokeless tobacco. She reports that she drinks about 0.8 standard drinks of alcohol per week. She reports that she does not use drugs. Review of Systems - History obtained from the patient  General ROS: +fever, chills, lack of appetite. Psychological ROS: negative  Ophthalmic ROS: negative  ENT ROS: negative  Respiratory ROS: no cough, shortness of breath, or wheezing  Cardiovascular ROS: no chest pain or dyspnea on exertion  Gastrointestinal ROS: no abdominal pain, change in bowel habits, or black or bloody stools  Genito-Urinary ROS: +urinary frequency. Musculoskeletal ROS: +lower back discomfort  Neurological ROS: +dizziness. Dermatological ROS: negative    OBJECTIVE:   Vitals:   Visit Vitals  /72 (BP 1 Location: Right arm, BP Patient Position: Sitting)   Pulse (!) 103   Temp (!) 102.3 °F (39.1 °C) (Oral)   Resp 18   Ht 5' 6.5\" (1.689 m)   Wt 183 lb (83 kg)   SpO2 94%   BMI 29.09 kg/m²      Gen: Pleasant, Unsteady Balance/Gait 81 y.o.  female in NAD. HEENT: NC/AT. HEART: RRR, No M/G/R.   LUNGS: CTAB No W/R. EXTREMITIES: Warm. No C/C/E.   NEURO: Alert and oriented x 3. Cranial nerves grossly intact. No focal sensory or motor deficits noted. SKIN: Warm. Dry. No rashes or other lesions noted.   ABDOMEN: +suprapubic pressure. S, ND, BS+. ASSESSMENT/ PLAN:     Diagnoses and all orders for this visit:    1. Urinary frequency  -     AMB POC URINALYSIS DIP STICK AUTO W/O MICRO    2. Fever and chills    1. Urinary Frequency  I ordered a urinalysis and a urine culture to check for a UTI. 2. Fever and Chills   Urine was cultured but d/t fever, chills, lack of appetite, and disorientation per pt history, she is going directly to the ER for stat labs and potential IV antibiotics. I have reviewed the patient's medications and risks/side effects/benefits were discussed. Diagnosis(-es) explained to patient and questions answered. Literature provided where appropriate.      Written by Jasen Cisneros, as dictated by Berto Ruiz MD.

## 2019-10-21 NOTE — PROGRESS NOTES
Identified pt with two pt identifiers(name and ). Reviewed record in preparation for visit and have obtained necessary documentation. Chief Complaint   Patient presents with    Urinary Frequency    Dizziness     Pt reports having dizziness and feeling off balance. Visit Vitals  /72 (BP 1 Location: Right arm, BP Patient Position: Sitting)   Pulse (!) 103   Temp (!) 102.3 °F (39.1 °C) (Oral)   Resp 18   Ht 5' 6.5\" (1.689 m)   Wt 183 lb (83 kg)   SpO2 94%   BMI 29.09 kg/m²       Health Maintenance Due   Topic    DTaP/Tdap/Td series (1 - Tdap)    Shingrix Vaccine Age 49> (1 of 2)    Bone Densitometry (Dexa) Screening     Pneumococcal 65+ years (2 of 2 - PCV13)    GLAUCOMA SCREENING Q2Y     Influenza Age 5 to Adult        Med Reconciliation: Completed    Coordination of Care Questionnaire:  :   1) Have you been to an emergency room, urgent care, or hospitalized since your last visit? If yes, where when, and reason for visit? No       2. Have seen or consulted any other health care provider since your last visit? If yes, where when, and reason for visit? No       3) Do you have an Advanced Directive/ Living Will in place? No  If yes, do we have a copy on file   If no, would you like information       Patient is accompanied by friend I have received verbal consent from Hernandez Bolton to discuss any/all medical information while they are present in the room.

## 2019-10-21 NOTE — TELEPHONE ENCOUNTER
#[phone] Pt has an appt with Dr. Jim Nguyen for UTI. Is there any way that pt can just come in and do urinalysis  instead to find this out instead of seeing the doctor?   Please call pt #[phone]

## 2019-10-22 NOTE — ED NOTES
MD Nubia Doe reviewed discharge instructions with the patient and son. The patient and son verbalized understanding. Patient discharged home with stable vitals. Patient out of ED ambulatory with discharge instructions in hand. Opportunity for questions and clarification provided. No further complaints noted at this time.

## 2019-10-22 NOTE — ED TRIAGE NOTES
Patient presents to the ED complaining of 3 days of burning with urination. Patient reports having a fever at home. Patient went to her primary care doctor's office today and had a urinalysis that showed UTI and was referred to the emergency department. Patient denies nausea, vomiting or diarrhea. She has not been on any antibiotics recently for UTIs. She has had some decreased appetite. She states she felt a little wobbly today when at her primary care doctor's office but this improved. Patient placed on the monitor x3 and provided with her call bell. Patient's son is at bedside.

## 2019-10-22 NOTE — ED PROVIDER NOTES
EMERGENCY DEPARTMENT HISTORY AND PHYSICAL EXAM      Date: 10/21/2019  Patient Name: Hernandez Bolton    History of Presenting Illness     Chief Complaint   Patient presents with    Urinary Frequency     Ambulatory c/o UTI per MD Pt states she feels \"whobbly\" Pt states she hasn't felt good x couple of weeks Began with UTI symptoms fever and decreased PO on SAt       History Provided By: Patient    HPI: Hernandez Bolton, 81 y.o. female  presents to the ED with cc of dysuria. Patient states she has had 3 days of burning with urination. She does have fever at home. She went to her primary care doctor's office today and had a urinalysis that showed UTI and was referred to the emergency department. She has not had any chest pain or shortness of breath. No nausea vomiting or diarrhea. She has not been on any antibiotics recently for UTIs. She has had some decreased appetite. She states she felt a little wobbly today when at her primary care doctor's office but this improved. There are no other complaints, changes, or physical findings at this time. PCP: Rell Matrin MD    No current facility-administered medications on file prior to encounter. Current Outpatient Medications on File Prior to Encounter   Medication Sig Dispense Refill    losartan (COZAAR) 50 mg tablet TAKE 1 TABLET DAILY 90 Tab 4    tiZANidine (ZANAFLEX) 2 mg tablet Take 1 Tab by mouth four (4) times daily (with meals). (Patient not taking: Reported on 10/21/2019) 60 Tab 1    omeprazole (PRILOSEC) 40 mg capsule TAKE 1 CAPSULE DAILY 60 Cap 6    simvastatin (ZOCOR) 20 mg tablet TAKE 1 TABLET NIGHTLY 90 Tab 4    rOPINIRole (REQUIP) 0.25 mg tablet Take 1 Tab by mouth every evening.  30 Tab 1    [DISCONTINUED] varicella-zoster recombinant, PF, (SHINGRIX, PF,) 50 mcg/0.5 mL susr injection 0.5mL by IntraMUSCular route once now and then repeat in 2-6 months 0.5 mL 1    cholecalciferol, vitamin D3, (VITAMIN D3) 2,000 unit tab Take 1 Tab by mouth daily.  timolol maleate 0.5 % DrpD ophthalmic solution Administer 1 Drop to both eyes daily.  aspirin delayed-release (ASPIR-LOW) 81 mg tablet Take 81 mg by mouth daily. Indications: CEREBRAL THROMBOEMBOLISM PREVENTION, PREVENTION OF TRANSIENT ISCHEMIC ATTACKS         Past History     Past Medical History:  Past Medical History:   Diagnosis Date    Adverse effect of anesthesia     took a long time to go to sleep with last colonoscopy    CAD (coronary artery disease)     MI?  Chronic pain     hip rt    Gastrointestinal disorder     GERD    Glaucoma     Hypertension     Ill-defined condition     osteopenia    Ill-defined condition     pulled shoulder lt. and elbow replacement       Past Surgical History:  Past Surgical History:   Procedure Laterality Date    COLONOSCOPY N/A 2016    COLONOSCOPY performed by Sonja Saldana MD at [address]  2016         HX COLONOSCOPY      HX ORTHOPAEDIC      left arm surgery elbow replacement, rods in lt arm       Family History:  Family History   Problem Relation Age of Onset    No Known Problems Mother     Cancer Father        Social History:  Social History     Tobacco Use    Smoking status: Former Smoker     Last attempt to quit: 1959     Years since quittin.8    Smokeless tobacco: Never Used   Substance Use Topics    Alcohol use: Yes     Alcohol/week: 0.8 standard drinks     Types: 1 Glasses of wine per week     Comment: socially    Drug use: No       Allergies: Allergies   Allergen Reactions    Fosamax [Alendronate] Nausea Only         Review of Systems   Review of Systems   Constitutional: Positive for appetite change and fever. Negative for chills. HENT: Negative for congestion, ear pain, rhinorrhea, sore throat and trouble swallowing. Eyes: Negative for visual disturbance. Respiratory: Negative for cough, chest tightness and shortness of breath.     Cardiovascular: Negative for chest pain and palpitations. Gastrointestinal: Negative for abdominal pain, blood in stool, constipation, diarrhea, nausea and vomiting. Genitourinary: Positive for dysuria. Negative for decreased urine volume, difficulty urinating and frequency. Musculoskeletal: Negative for back pain and neck pain. Skin: Negative for color change and rash. Neurological: Negative for dizziness, weakness, light-headedness and headaches. Physical Exam   Physical Exam   Constitutional: She is oriented to person, place, and time. She appears well-developed and well-nourished. She does not appear ill. No distress. HENT:   Mouth/Throat: Oropharynx is clear and moist.   Eyes: Conjunctivae are normal.   Neck: Neck supple. Cardiovascular: Normal rate and regular rhythm. Pulmonary/Chest: Effort normal and breath sounds normal. No accessory muscle usage. No respiratory distress. Abdominal: Soft. She exhibits no distension. There is no tenderness. Lymphadenopathy:     She has no cervical adenopathy. Neurological: She is alert and oriented to person, place, and time. She has normal strength. No cranial nerve deficit or sensory deficit. Skin: Skin is warm and dry. Nursing note and vitals reviewed.       Diagnostic Study Results     Labs -     Recent Results (from the past 24 hour(s))   AMB POC URINALYSIS DIP STICK AUTO W/O MICRO    Collection Time: 10/21/19  4:47 PM   Result Value Ref Range    Color (UA POC) Orange     Clarity (UA POC) Cloudy     Glucose (UA POC) Negative Negative    Bilirubin (UA POC) Negative Negative    Ketones (UA POC) Negative Negative    Specific gravity (UA POC) 1.020 1.001 - 1.035    Blood (UA POC) 2+ Negative    pH (UA POC) 5.5 4.6 - 8.0    Protein (UA POC) 2+ Negative    Urobilinogen (UA POC) 0.2 mg/dL 0.2 - 1    Nitrites (UA POC) Negative Negative    Leukocyte esterase (UA POC) 3+ Negative   CBC WITH AUTOMATED DIFF    Collection Time: 10/21/19  8:31 PM   Result Value Ref Range    WBC 10.4 3.6 - 11.0 K/uL    RBC 4.83 3.80 - 5.20 M/uL    HGB 14.4 11.5 - 16.0 g/dL    HCT 44.7 35.0 - 47.0 %    MCV 92.5 80.0 - 99.0 FL    MCH 29.8 26.0 - 34.0 PG    MCHC 32.2 30.0 - 36.5 g/dL    RDW 12.4 11.5 - 14.5 %    PLATELET 824 619 - 000 K/uL    MPV 10.1 8.9 - 12.9 FL    NRBC 0.0 0  WBC    ABSOLUTE NRBC 0.00 0.00 - 0.01 K/uL    NEUTROPHILS 75 32 - 75 %    LYMPHOCYTES 17 12 - 49 %    MONOCYTES 7 5 - 13 %    EOSINOPHILS 0 0 - 7 %    BASOPHILS 0 0 - 1 %    IMMATURE GRANULOCYTES 1 (H) 0.0 - 0.5 %    ABS. NEUTROPHILS 7.9 1.8 - 8.0 K/UL    ABS. LYMPHOCYTES 1.7 0.8 - 3.5 K/UL    ABS. MONOCYTES 0.7 0.0 - 1.0 K/UL    ABS. EOSINOPHILS 0.0 0.0 - 0.4 K/UL    ABS. BASOPHILS 0.0 0.0 - 0.1 K/UL    ABS. IMM. GRANS. 0.1 (H) 0.00 - 0.04 K/UL    DF AUTOMATED     METABOLIC PANEL, COMPREHENSIVE    Collection Time: 10/21/19  8:31 PM   Result Value Ref Range    Sodium 134 (L) 136 - 145 mmol/L    Potassium 3.5 3.5 - 5.1 mmol/L    Chloride 98 97 - 108 mmol/L    CO2 28 21 - 32 mmol/L    Anion gap 8 5 - 15 mmol/L    Glucose 119 (H) 65 - 100 mg/dL    BUN 28 (H) 6 - 20 MG/DL    Creatinine 1.08 (H) 0.55 - 1.02 MG/DL    BUN/Creatinine ratio 26 (H) 12 - 20      GFR est AA 59 (L) >60 ml/min/1.73m2    GFR est non-AA 49 (L) >60 ml/min/1.73m2    Calcium 9.8 8.5 - 10.1 MG/DL    Bilirubin, total 0.8 0.2 - 1.0 MG/DL    ALT (SGPT) 18 12 - 78 U/L    AST (SGOT) 12 (L) 15 - 37 U/L    Alk.  phosphatase 90 45 - 117 U/L    Protein, total 8.0 6.4 - 8.2 g/dL    Albumin 3.8 3.5 - 5.0 g/dL    Globulin 4.2 (H) 2.0 - 4.0 g/dL    A-G Ratio 0.9 (L) 1.1 - 2.2     LACTIC ACID    Collection Time: 10/21/19  8:31 PM   Result Value Ref Range    Lactic acid 1.2 0.4 - 2.0 MMOL/L   URINALYSIS W/ RFLX MICROSCOPIC    Collection Time: 10/21/19  8:59 PM   Result Value Ref Range    Color YELLOW/STRAW      Appearance TURBID (A) CLEAR      Specific gravity 1.015 1.003 - 1.030      pH (UA) 5.5 5.0 - 8.0      Protein 100 (A) NEG mg/dL    Glucose NEGATIVE  NEG mg/dL    Ketone NEGATIVE  NEG mg/dL    Bilirubin NEGATIVE  NEG      Blood MODERATE (A) NEG      Urobilinogen 1.0 0.2 - 1.0 EU/dL    Nitrites NEGATIVE  NEG      Leukocyte Esterase LARGE (A) NEG      WBC >100 (H) 0 - 4 /hpf    RBC 10-20 0 - 5 /hpf    Epithelial cells FEW FEW /lpf    Bacteria 4+ (A) NEG /hpf    Hyaline cast 2-5 0 - 5 /lpf       Radiologic Studies -   No orders to display     CT Results  (Last 48 hours)    None        CXR Results  (Last 48 hours)    None            Medical Decision Making   I am the first provider for this patient. I reviewed the vital signs, available nursing notes, past medical history, past surgical history, family history and social history. Vital Signs-Reviewed the patient's vital signs. Patient Vitals for the past 24 hrs:   Temp Pulse Resp BP SpO2   10/21/19 1727 99 °F (37.2 °C) (!) 110 16 151/52 98 %       Records Reviewed: Nursing Notes and Old Medical Records    Provider Notes (Medical Decision Making): This patient presents with urinary symptoms and does have a urinary tract infection noted on her urinalysis. She otherwise does not present febrile. She was tachycardic on arrival but this is improved. She does not meet any other SIRS criteria to suggest sepsis. She has no acute kidney injury. No leukocytosis. I will give her an IV dose of antibiotics here in the emergency department. I will discharge her on a cephalosporin. Follow-up with her primary care doctor in 1 week. Return if any worsening symptoms. ED Course:   Initial assessment performed. The patients presenting problems have been discussed, and they are in agreement with the care plan formulated and outlined with them. I have encouraged them to ask questions as they arise throughout their visit.          Orders Placed This Encounter    SEPSIS ORDERS INITIATED IN TRIAGE (DO NOT DESELECT)    CULTURE, BLOOD, PAIRED    CULTURE, URINE    CBC WITH AUTOMATED DIFF    METABOLIC PANEL, COMPREHENSIVE    URINALYSIS W/ RFLX MICROSCOPIC    LACTIC ACID    DIET NPO    WEIGH PATIENT    POC LACTIC ACID (if available)    SALINE LOCK IV ONE TIME STAT    INSERT PERIPHERAL IV ONE TIME STAT    cefTRIAXone (ROCEPHIN) 1 g in 0.9% sodium chloride (MBP/ADV) 50 mL    cephALEXin (KEFLEX) 500 mg capsule         Critical Care Time:   0    Disposition:  Discharge  10:36 PM    The patient's emergency department evaluation is now complete. I have reviewed all labs, imaging, and pertinent information. I have discussed all results with the patient and/or family. Based on our evaluation today I do believe that the patient is safe to be discharged home. The patient has been provided with at home instructions that are pertinent to their complaint today, although these may not be specific to the exact diagnosis. I have reviewed the patient's home medications and attempted to reconcile if not already done so by pharmacy or nursing staff. I have discussed all new prescriptions with the patient. The patient has been encouraged to follow-up with primary care doctor and/or specialist, and these have been discussed with the patient. The patient has been advised that they may return to the emergency department if they have any worsening symptoms and or new symptoms that are of concern to them. Verbal discharge instructions may have also been provided to the patient that may not be specifically contained in the written discharge instructions. The patient has been given opportunity to ask questions prior to discharge. PLAN:  1. Current Discharge Medication List      START taking these medications    Details   cephALEXin (KEFLEX) 500 mg capsule Take 1 Cap by mouth three (3) times daily for 7 days. Qty: 21 Cap, Refills: 0           2.    Follow-up Information     Follow up With Specialties Details Why Contact Info    Velma Astudillo MD [facility] Schedule an appointment as soon as possible for a visit in 1 week  [address]  [facility]  [phone]          Return to ED if worse     Diagnosis     Clinical Impression:   1. Acute cystitis without hematuria    2. Dysuria            This note will not be viewable in MyChart.

## 2019-10-23 LAB
BACTERIA SPEC CULT: ABNORMAL
CC UR VC: ABNORMAL
SERVICE CMNT-IMP: ABNORMAL

## 2019-10-24 LAB — BACTERIA UR CULT: ABNORMAL

## 2019-10-26 LAB
BACTERIA SPEC CULT: NORMAL
SERVICE CMNT-IMP: NORMAL

## 2019-10-28 ENCOUNTER — HOSPITAL ENCOUNTER (OUTPATIENT)
Dept: LAB | Age: 84
Discharge: HOME | End: 2019-10-28
Payer: MEDICARE

## 2019-10-28 ENCOUNTER — OFFICE VISIT (OUTPATIENT)
Dept: INTERNAL MEDICINE CLINIC | Age: 84
End: 2019-10-28

## 2019-10-28 VITALS
OXYGEN SATURATION: 96 % | DIASTOLIC BLOOD PRESSURE: 71 MMHG | WEIGHT: 183 LBS | RESPIRATION RATE: 16 BRPM | HEART RATE: 93 BPM | BODY MASS INDEX: 29.41 KG/M2 | HEIGHT: 66 IN | SYSTOLIC BLOOD PRESSURE: 154 MMHG | TEMPERATURE: 98.2 F

## 2019-10-28 DIAGNOSIS — I10 HYPERTENSION, UNSPECIFIED TYPE: ICD-10-CM

## 2019-10-28 DIAGNOSIS — M54.2 NECK PAIN: Primary | ICD-10-CM

## 2019-10-28 DIAGNOSIS — Z87.440 HX: UTI (URINARY TRACT INFECTION): ICD-10-CM

## 2019-10-28 DIAGNOSIS — R73.09 ELEVATED HEMOGLOBIN A1C: ICD-10-CM

## 2019-10-28 DIAGNOSIS — N39.46 MIXED STRESS AND URGE URINARY INCONTINENCE: ICD-10-CM

## 2019-10-28 DIAGNOSIS — M54.2 CERVICALGIA: Primary | ICD-10-CM

## 2019-10-28 DIAGNOSIS — R68.89 OTHER GENERAL SYMPTOMS AND SIGNS: ICD-10-CM

## 2019-10-28 PROCEDURE — 36415 COLL VENOUS BLD VENIPUNCTURE: CPT

## 2019-10-28 PROCEDURE — 85025 COMPLETE CBC W/AUTO DIFF WBC: CPT

## 2019-10-28 PROCEDURE — 87086 URINE CULTURE/COLONY COUNT: CPT

## 2019-10-28 PROCEDURE — 80053 COMPREHEN METABOLIC PANEL: CPT

## 2019-10-28 PROCEDURE — 81001 URINALYSIS AUTO W/SCOPE: CPT

## 2019-10-28 PROCEDURE — 83036 HEMOGLOBIN GLYCOSYLATED A1C: CPT

## 2019-10-28 NOTE — PROGRESS NOTES
SUBJECTIVE:   Ms. Linda Sethi is a 81 y.o. female who is here c/o neck pain and acute cystitis. Neck Pain: Pt c/o intermittent neck pain that has been persistent since her last office visit on 9/27/2019. She went to PT for one month that was only minimally helpful and requested an XR. She notes that she has been using a cervical neck pillow but still wakes up with the pain. She describes the discomfort in the morning as though she slept in the wrong position. Pt mentioned that she does have a bed in her house that can be electronically elevated and is open to sleeping there to see if that improves the pain. Pt has tried icing and heating with no relief. Acute Cystitis: Pt reports that her urine is clear now and that she has been drinking water. She notes that she initially felt as though her bladder was heavy and had fallen, but that it has improved significantly since the UTI cleared. She still feels as though her bladder is still heavy and has been doing Kegel exercises. At this time, she is otherwise doing well and has brought no other complaints to my attention today. PMH:   Past Medical History:   Diagnosis Date    Adverse effect of anesthesia 2005    took a long time to go to sleep with last colonoscopy    CAD (coronary artery disease) 2010    MI?  Chronic pain     hip rt    Gastrointestinal disorder     GERD    Glaucoma     Hypertension     Ill-defined condition     osteopenia    Ill-defined condition     pulled shoulder lt. and elbow replacement     PSH:  has a past surgical history that includes hx colonoscopy; colonoscopy,diagnostic (9/9/2016); colonoscopy (N/A, 9/9/2016); and hx orthopaedic. All: is allergic to fosamax [alendronate]. MEDS:   Current Outpatient Medications   Medication Sig    cephALEXin (KEFLEX) 500 mg capsule Take 1 Cap by mouth three (3) times daily for 7 days.     losartan (COZAAR) 50 mg tablet TAKE 1 TABLET DAILY    omeprazole (PRILOSEC) 40 mg capsule TAKE 1 CAPSULE DAILY    simvastatin (ZOCOR) 20 mg tablet TAKE 1 TABLET NIGHTLY    cholecalciferol, vitamin D3, (VITAMIN D3) 2,000 unit tab Take 1 Tab by mouth daily.  timolol maleate 0.5 % DrpD ophthalmic solution Administer 1 Drop to both eyes daily.  tiZANidine (ZANAFLEX) 2 mg tablet Take 1 Tab by mouth four (4) times daily (with meals). (Patient not taking: Reported on 10/21/2019)    rOPINIRole (REQUIP) 0.25 mg tablet Take 1 Tab by mouth every evening. (Patient not taking: Reported on 10/28/2019)    aspirin delayed-release (ASPIR-LOW) 81 mg tablet Take 81 mg by mouth daily. Indications: CEREBRAL THROMBOEMBOLISM PREVENTION, PREVENTION OF TRANSIENT ISCHEMIC ATTACKS     No current facility-administered medications for this visit. FH: family history includes Cancer in her father; No Known Problems in her mother. SH:  reports that she quit smoking about 60 years ago. She has never used smokeless tobacco. She reports that she drinks about 0.8 standard drinks of alcohol per week. She reports that she does not use drugs. Review of Systems - History obtained from the patient  General ROS: negative  Psychological ROS: negative  Ophthalmic ROS: negative  ENT ROS: negative  Respiratory ROS: no cough, shortness of breath, or wheezing  Cardiovascular ROS: no chest pain or dyspnea on exertion  Gastrointestinal ROS: no abdominal pain, change in bowel habits, or black or bloody stools  Genito-Urinary ROS: +stress incontinence, bladder heaviness. Musculoskeletal ROS: +neck pain. Neurological ROS: negative  Dermatological ROS: negative    OBJECTIVE:   Vitals:   Visit Vitals  /71 (BP 1 Location: Right arm, BP Patient Position: Sitting)   Pulse 93   Temp 98.2 °F (36.8 °C) (Oral)   Resp 16   Ht 5' 6\" (1.676 m)   Wt 183 lb (83 kg)   SpO2 96%   BMI 29.54 kg/m²      Gen: Pleasant 81 y.o.  female in NAD. HEENT: NC/AT. HEART: RRR, No M/G/R.   LUNGS: CTAB No W/R. EXTREMITIES: Warm.  No C/C/E. NEURO: Alert and oriented x 3. Cranial nerves grossly intact. No focal sensory or motor deficits noted. SKIN: Warm. Dry. No rashes or other lesions noted. ASSESSMENT/ PLAN:     Diagnoses and all orders for this visit:    1. Neck pain  -     XR SPINE CERV 4 OR 5 V; Future    2. Hypertension, unspecified type  -     METABOLIC PANEL, COMPREHENSIVE    3. Hx: UTI (urinary tract infection)  -     METABOLIC PANEL, COMPREHENSIVE  -     CBC WITH AUTOMATED DIFF  -     CULTURE, URINE  -     URINALYSIS W/ RFLX MICROSCOPIC    4. Mixed stress and urge urinary incontinence  -     REFERRAL TO FEMALE PELVIC MEDICINE AND RECONSTRUCTIVE SURGERY  -     URINALYSIS W/ RFLX MICROSCOPIC    5. Other general symptoms and signs   -     CULTURE, URINE    6. Elevated hemoglobin A1c  -     HEMOGLOBIN A1C WITH EAG      1. Neck Pain  I ordered a Cervical Spine XR to determine the extent of arthritis in the neck. I advised pt to try sleeping in the bed that raises up and use a cervical neck pillow to improve her sleep. 2. Hypertension  BP seems to be well controlled. I recommended continuing current dose of losartan, eating a low sodium diet, and increasing exercise. Recheck CMP. 3. Hx: UTI  I ordered a urinalysis, urine culture, CMP and CBC to ensure the infection has cleared. 4. Mixed Stress and Urge Urinary Incontinence  I provided a referral to Dr. Diane Cohen (urogynecology) for pelvic floor PT. I ordered a urinalysis to ensure the urine is normal.    5. Other General Sx and Signs  I ordered a urine culture to ensure the infection has cleared. 6. Elevated Hemoglobin A1c  I ordered a hemoglobin A1c lab to f/u on previously elevated levels. I have reviewed the patient's medications and risks/side effects/benefits were discussed. Diagnosis(-es) explained to patient and questions answered. Literature provided where appropriate.      Written by Eleuterio Suazo, as dictated by Aida Enciso MD.

## 2019-10-28 NOTE — PROGRESS NOTES
Identified pt with two pt identifiers(name and ). Reviewed record in preparation for visit and have obtained necessary documentation. Chief Complaint   Patient presents with    Neck Pain     Pt reports still having neck pain after physical therapy.  Other     Acute Cystitis and feeling like her bladder has dropped         Visit Vitals  /71 (BP 1 Location: Right arm, BP Patient Position: Sitting)   Pulse 93   Temp 98.2 °F (36.8 °C) (Oral)   Resp 16   Ht 5' 6\" (1.676 m)   Wt 183 lb (83 kg)   SpO2 96%   BMI 29.54 kg/m²       Health Maintenance Due   Topic    DTaP/Tdap/Td series (1 - Tdap)    Shingrix Vaccine Age 49> (1 of 2)    Bone Densitometry (Dexa) Screening     Pneumococcal 65+ years (2 of 2 - PCV13)    GLAUCOMA SCREENING Q2Y     Influenza Age 5 to Adult        Med Reconciliation: Completed    Coordination of Care Questionnaire:  :   1) Have you been to an emergency room, urgent care, or hospitalized since your last visit? If yes, where when, and reason for visit? Yes, 10/21/2019 [facility] for urinary frequency. 2. Have seen or consulted any other health care provider since your last visit? If yes, where when, and reason for visit? No       3) Do you have an Advanced Directive/ Living Will in place? No  If yes, do we have a copy on file   If no, would you like information       Patient is accompanied by self I have received verbal consent from Roxanne Hickman to discuss any/all medical information while they are present in the room.

## 2019-10-29 LAB
ALBUMIN SERPL-MCNC: 4.2 G/DL (ref 3.5–4.7)
ALBUMIN/GLOB SERPL: 1.8 {RATIO} (ref 1.2–2.2)
ALP SERPL-CCNC: 103 IU/L (ref 39–117)
ALT SERPL-CCNC: 22 IU/L (ref 0–32)
APPEARANCE UR: CLEAR
AST SERPL-CCNC: 21 IU/L (ref 0–40)
BACTERIA #/AREA URNS HPF: NORMAL /[HPF]
BASOPHILS # BLD AUTO: 0 X10E3/UL (ref 0–0.2)
BASOPHILS NFR BLD AUTO: 1 %
BILIRUB SERPL-MCNC: 0.3 MG/DL (ref 0–1.2)
BILIRUB UR QL STRIP: NEGATIVE
BUN SERPL-MCNC: 16 MG/DL (ref 8–27)
BUN/CREAT SERPL: 20 (ref 12–28)
CALCIUM SERPL-MCNC: 10 MG/DL (ref 8.7–10.3)
CASTS URNS QL MICRO: NORMAL /LPF
CHLORIDE SERPL-SCNC: 102 MMOL/L (ref 96–106)
CO2 SERPL-SCNC: 26 MMOL/L (ref 20–29)
COLOR UR: YELLOW
CREAT SERPL-MCNC: 0.82 MG/DL (ref 0.57–1)
EOSINOPHIL # BLD AUTO: 0.2 X10E3/UL (ref 0–0.4)
EOSINOPHIL NFR BLD AUTO: 2 %
EPI CELLS #/AREA URNS HPF: NORMAL /HPF (ref 0–10)
ERYTHROCYTE [DISTWIDTH] IN BLOOD BY AUTOMATED COUNT: 12.4 % (ref 12.3–15.4)
EST. AVERAGE GLUCOSE BLD GHB EST-MCNC: 137 MG/DL
GLOBULIN SER CALC-MCNC: 2.3 G/DL (ref 1.5–4.5)
GLUCOSE SERPL-MCNC: 104 MG/DL (ref 65–99)
GLUCOSE UR QL: NEGATIVE
HBA1C MFR BLD: 6.4 % (ref 4.8–5.6)
HCT VFR BLD AUTO: 40.1 % (ref 34–46.6)
HGB BLD-MCNC: 13.1 G/DL (ref 11.1–15.9)
HGB UR QL STRIP: NEGATIVE
IMM GRANULOCYTES # BLD AUTO: 0 X10E3/UL (ref 0–0.1)
IMM GRANULOCYTES NFR BLD AUTO: 1 %
KETONES UR QL STRIP: NEGATIVE
LEUKOCYTE ESTERASE UR QL STRIP: ABNORMAL
LYMPHOCYTES # BLD AUTO: 2 X10E3/UL (ref 0.7–3.1)
LYMPHOCYTES NFR BLD AUTO: 30 %
MCH RBC QN AUTO: 29.4 PG (ref 26.6–33)
MCHC RBC AUTO-ENTMCNC: 32.7 G/DL (ref 31.5–35.7)
MCV RBC AUTO: 90 FL (ref 79–97)
MICRO URNS: ABNORMAL
MONOCYTES # BLD AUTO: 0.4 X10E3/UL (ref 0.1–0.9)
MONOCYTES NFR BLD AUTO: 6 %
MUCOUS THREADS URNS QL MICRO: PRESENT
NEUTROPHILS # BLD AUTO: 4 X10E3/UL (ref 1.4–7)
NEUTROPHILS NFR BLD AUTO: 60 %
NITRITE UR QL STRIP: NEGATIVE
PH UR STRIP: 6 [PH] (ref 5–7.5)
PLATELET # BLD AUTO: 318 X10E3/UL (ref 150–450)
POTASSIUM SERPL-SCNC: 5.3 MMOL/L (ref 3.5–5.2)
PROT SERPL-MCNC: 6.5 G/DL (ref 6–8.5)
PROT UR QL STRIP: NEGATIVE
RBC # BLD AUTO: 4.46 X10E6/UL (ref 3.77–5.28)
RBC #/AREA URNS HPF: NORMAL /HPF (ref 0–2)
SODIUM SERPL-SCNC: 144 MMOL/L (ref 134–144)
SP GR UR: 1.01 (ref 1–1.03)
UROBILINOGEN UR STRIP-MCNC: 0.2 MG/DL (ref 0.2–1)
WBC # BLD AUTO: 6.6 X10E3/UL (ref 3.4–10.8)
WBC #/AREA URNS HPF: NORMAL /HPF (ref 0–5)

## 2019-10-30 LAB — BACTERIA UR CULT: NO GROWTH

## 2019-10-31 ENCOUNTER — TELEPHONE (OUTPATIENT)
Dept: INTERNAL MEDICINE CLINIC | Age: 84
End: 2019-10-31

## 2019-10-31 NOTE — TELEPHONE ENCOUNTER
----- Message from Kera Grady sent at 10/31/2019  9:33 AM EDT -----  Regarding: Dr. Ramón Montemayor Message/Vendor Calls    Caller's first and last name:      Reason for call:  Xray results    Callback required yes/no and why:  Yes, requesting a return call for results    Best contact number(s):    [phone]  Details to clarify the request:      Kera Grady

## 2019-11-01 NOTE — TELEPHONE ENCOUNTER
----- Message from Cait Tracy sent at 11/1/2019 12:02 PM EDT -----  Regarding: Dr. Ana Zhang telephone   General Message/Vendor Calls    Caller's first and last name:      Reason for call:  Pt would like a call back in regards to XRAY results from Monday.      Callback required yes/no and why:  Yes     Best contact number(s):    [phone]  Details to clarify the request:      Cait Tracy

## 2019-11-12 ENCOUNTER — TELEPHONE (OUTPATIENT)
Dept: INTERNAL MEDICINE CLINIC | Age: 84
End: 2019-11-12

## 2019-11-12 NOTE — TELEPHONE ENCOUNTER
----- Message from Radha Rubio sent at 11/12/2019 12:53 PM EST -----  Regarding: Dr. Devang Fraser  Patient return call  yes    Caller's first and last name and relationship (if not the patient):      Best contact number(s): [phone] before 1:30 pm       Whose call is being returned: nurse      Details to clarify the request: Patient is returning your call      Radha Rubio

## 2019-11-13 ENCOUNTER — TELEPHONE (OUTPATIENT)
Dept: INTERNAL MEDICINE CLINIC | Age: 84
End: 2019-11-13

## 2019-11-14 ENCOUNTER — TELEPHONE (OUTPATIENT)
Dept: INTERNAL MEDICINE CLINIC | Age: 84
End: 2019-11-14

## 2019-11-14 NOTE — TELEPHONE ENCOUNTER
Patient has scheduled an appointment with [facility]. It's on December 5, 2019 with Dr. Zuniga Dose assistant.

## 2020-01-17 ENCOUNTER — TELEPHONE (OUTPATIENT)
Dept: INTERNAL MEDICINE CLINIC | Age: 85
End: 2020-01-17

## 2020-01-17 NOTE — TELEPHONE ENCOUNTER
Rhina Nguyen [facility]   Phone Number: [phone]             Pt is requesting a call back to schedule a follow up , did not know the password.  Best contact [phone]     Copy/Paste  [email or web address]

## 2020-01-21 NOTE — TELEPHONE ENCOUNTER
Called, spoke with Pt. Two pt identifiers confirmed. Pt stated she wanted to make an appt for annual wellness. Pt offered and accepted appt for Friday, February 14, 2020 08:15 AM w/ Dr. Rick Will.   Pt verbalized understanding of information discussed w/ no further questions at this time.

## 2020-02-14 ENCOUNTER — OFFICE VISIT (OUTPATIENT)
Dept: INTERNAL MEDICINE CLINIC | Age: 85
End: 2020-02-14

## 2020-02-14 ENCOUNTER — HOSPITAL ENCOUNTER (OUTPATIENT)
Dept: LAB | Age: 85
Discharge: HOME | End: 2020-02-14
Payer: MEDICARE

## 2020-02-14 VITALS
BODY MASS INDEX: 30.05 KG/M2 | HEART RATE: 74 BPM | TEMPERATURE: 97.8 F | RESPIRATION RATE: 16 BRPM | WEIGHT: 187 LBS | SYSTOLIC BLOOD PRESSURE: 135 MMHG | HEIGHT: 66 IN | DIASTOLIC BLOOD PRESSURE: 77 MMHG | OXYGEN SATURATION: 96 %

## 2020-02-14 DIAGNOSIS — Z13.31 SCREENING FOR DEPRESSION: ICD-10-CM

## 2020-02-14 DIAGNOSIS — R93.89 ABNORMAL X-RAY: ICD-10-CM

## 2020-02-14 DIAGNOSIS — Z13.6 SCREENING FOR ISCHEMIC HEART DISEASE: ICD-10-CM

## 2020-02-14 DIAGNOSIS — Z00.00 MEDICARE ANNUAL WELLNESS VISIT, SUBSEQUENT: Primary | ICD-10-CM

## 2020-02-14 DIAGNOSIS — R73.09 ELEVATED HEMOGLOBIN A1C: ICD-10-CM

## 2020-02-14 DIAGNOSIS — Z13.39 SCREENING FOR ALCOHOLISM: ICD-10-CM

## 2020-02-14 DIAGNOSIS — I10 ESSENTIAL HYPERTENSION: ICD-10-CM

## 2020-02-14 PROCEDURE — 80053 COMPREHEN METABOLIC PANEL: CPT

## 2020-02-14 PROCEDURE — 83036 HEMOGLOBIN GLYCOSYLATED A1C: CPT

## 2020-02-14 PROCEDURE — 36415 COLL VENOUS BLD VENIPUNCTURE: CPT

## 2020-02-14 PROCEDURE — 85025 COMPLETE CBC W/AUTO DIFF WBC: CPT

## 2020-02-14 PROCEDURE — 80061 LIPID PANEL: CPT

## 2020-02-14 NOTE — PROGRESS NOTES
Identified pt with two pt identifiers(name and ). Reviewed record in preparation for visit and have obtained necessary documentation. Chief Complaint   Patient presents with   [facility] Annual Wellness Visit        Visit Vitals  /77 (BP 1 Location: Right arm, BP Patient Position: Sitting)   Pulse 74   Temp 97.8 °F (36.6 °C) (Oral)   Resp 16   Ht 5' 6\" (1.676 m)   Wt 187 lb (84.8 kg)   SpO2 96%   BMI 30.18 kg/m²       Health Maintenance Due   Topic    DTaP/Tdap/Td series (1 - Tdap)    Shingrix Vaccine Age 50> (1 of 2)    Bone Densitometry (Dexa) Screening     GLAUCOMA SCREENING Q2Y     Influenza Age 5 to Adult     Lipid Screen     Medicare Yearly Exam        Med Reconciliation: Completed    Coordination of Care Questionnaire:  :   1) Have you been to an emergency room, urgent care, or hospitalized since your last visit? If yes, where when, and reason for visit? No       2. Have seen or consulted any other health care provider since your last visit? If yes, where when, and reason for visit? No       3) Do you have an Advanced Directive/ Living Will in place? No  If yes, do we have a copy on file   If no, would you like information     Patient is accompanied by self I have received verbal consent from Irene Pinto to discuss any/all medical information while they are present in the room.

## 2020-02-14 NOTE — PATIENT INSTRUCTIONS
Medicare Wellness Visit, Female The best way to live healthy is to have a lifestyle where you eat a well-balanced diet, exercise regularly, limit alcohol use, and quit all forms of tobacco/nicotine, if applicable. Regular preventive services are another way to keep healthy. Preventive services (vaccines, screening tests, monitoring & exams) can help personalize your care plan, which helps you manage your own care. Screening tests can find health problems at the earliest stages, when they are easiest to treat. [facility] follows the current, evidence-based guidelines published by the [facility] Marlon Horta ([facility]) when recommending preventive services for our patients. Because we follow these guidelines, sometimes recommendations change over time as research supports it. (For example, mammograms used to be recommended annually. Even though Medicare will still pay for an annual mammogram, the newer guidelines recommend a mammogram every two years for women of average risk). Of course, you and your doctor may decide to screen more often for some diseases, based on your risk and your co-morbidities (chronic disease you are already diagnosed with). Preventive services for you include: - Medicare offers their members a free annual wellness visit, which is time for you and your primary care provider to discuss and plan for your preventive service needs. Take advantage of this benefit every year! 
-All adults over the age of 72 should receive the recommended pneumonia vaccines. Current USPSTF guidelines recommend a series of two vaccines for the best pneumonia protection.  
-All adults should have a flu vaccine yearly and a tetanus vaccine every 10 years.  
-All adults age 48 and older should receive the shingles vaccines (series of two vaccines). -All adults age 38-68 who are overweight should have a diabetes screening test once every three years. -All adults born between [birth year] and 1964 should be screened once for Hepatitis C. 
-Other screening tests and preventive services for persons with diabetes include: an eye exam to screen for diabetic retinopathy, a kidney function test, a foot exam, and stricter control over your cholesterol.  
-Cardiovascular screening for adults with routine risk involves an electrocardiogram (ECG) at intervals determined by your doctor.  
-Colorectal cancer screenings should be done for adults age 54-65 with no increased risk factors for colorectal cancer. There are a number of acceptable methods of screening for this type of cancer. Each test has its own benefits and drawbacks. Discuss with your doctor what is most appropriate for you during your annual wellness visit. The different tests include: colonoscopy (considered the best screening method), a fecal occult blood test, a fecal DNA test, and sigmoidoscopy. 
 
-A bone mass density test is recommended when a woman turns 65 to screen for osteoporosis. This test is only recommended one time, as a screening. Some providers will use this same test as a disease monitoring tool if you already have osteoporosis. -Breast cancer screenings are recommended every other year for women of normal risk, age 54-69. 
-Cervical cancer screenings for women over age 72 are only recommended with certain risk factors. Here is a list of your current Health Maintenance items (your personalized list of preventive services) with a due date: 
Health Maintenance Due Topic Date Due  
 DTaP/Tdap/Td  (1 - Tdap) 11/08/1946  Shingles Vaccine (1 of 2) 11/08/1986  Bone Mineral Density   11/08/2001  Glaucoma Screening   10/06/2017  Flu Vaccine  08/01/2019  Cholesterol Test   01/11/2020 [facility] Annual Well Visit  01/12/2020

## 2020-02-14 NOTE — PROGRESS NOTES
SUBJECTIVE:   Nicanor Rodriguez is a 81 y.o. female who is here for her MAW. Pt is fasting for her labs. Pt specifically denies changes in vision, trouble with swallowing or taste, CP, SOB, heartburn or upset stomach, change in bowel habits, problems urinating, unusual joint or muscle pains, numbness or tingling in extremities, or skin lesions of concern. 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 She has a hearing aid in that ear and is regularly evaluated by ENT. HTN: Pt's BP in the office today was 135/77. Pt is compliant in taking losartan 50 mg tablet every day and ASA 81 mg tablet every day. Patient denies chest pain, CHARLES/SOB, edema, headache, visual changes, dizziness, palpitations or syncope. Elevated Hemoglobin A1c: Her last HgA1c was 6.4% on 10/28/19. Pt reports that the pain she was experiencing in her neck has resolved and feels significantly better. She went to PT for a month and purchased an arthritic pillow for management. She started doing a 15 minute exercise routine every day with light weights and walking. She has started carrying a cane when she walks outside in case her balance feels unsteady. She saw Dr. Joe Lu (urogynecology) and is using a pessary. She notes that it feels better and as though there is less pressure on her pelvic floor. She started experiencing some urge incontinence as a result. Pt intends to start taking Prilosec every other day before completely cutting it out. She will then take it PRN. PREVENTIVE:  Colonoscopy: 9/9/16 (Dr. Pancho Tapia, no f/u indicated)  Pap: declined  Mammogram: declined. I advised pt to do manual checks at home regularly. No FHx of breast cancer. Dexa: declined  PPSV-23: 11/10/2014  Flu: Will Complete. Eye Exam: UTD     At this time, she is otherwise doing well and has brought no other complaints to my attention today.  For a list of the medical issues addressed today, see the assessment and plan below. PMH:   Past Medical History:   Diagnosis Date    Adverse effect of anesthesia 2005    took a long time to go to sleep with last colonoscopy    CAD (coronary artery disease) 2010    MI?  Chronic pain     hip rt    Gastrointestinal disorder     GERD    Glaucoma     Hypertension     Ill-defined condition     osteopenia    Ill-defined condition     pulled shoulder lt. and elbow replacement       PSH:  has a past surgical history that includes hx colonoscopy; colonoscopy,diagnostic (9/9/2016); colonoscopy (N/A, 9/9/2016); and hx orthopaedic. Allergies: is allergic to fosamax [alendronate]. Meds:   Current Outpatient Medications   Medication Sig    losartan (COZAAR) 50 mg tablet TAKE 1 TABLET DAILY    omeprazole (PRILOSEC) 40 mg capsule TAKE 1 CAPSULE DAILY    simvastatin (ZOCOR) 20 mg tablet TAKE 1 TABLET NIGHTLY    cholecalciferol, vitamin D3, (VITAMIN D3) 2,000 unit tab Take 1 Tab by mouth daily.  timolol maleate 0.5 % DrpD ophthalmic solution Administer 1 Drop to both eyes daily.  aspirin delayed-release (ASPIR-LOW) 81 mg tablet Take 81 mg by mouth daily. Indications: CEREBRAL THROMBOEMBOLISM PREVENTION, PREVENTION OF TRANSIENT ISCHEMIC ATTACKS     No current facility-administered medications for this visit. Fam hx: family history includes Cancer in her father; No Known Problems in her mother. Soc hx:  reports that she quit smoking about 61 years ago. She has never used smokeless tobacco. She reports current alcohol use of about 0.8 standard drinks of alcohol per week. She reports that she does not use drugs.       Review of Systems - History obtained from the patient  General ROS: negative  Psychological ROS: negative  Ophthalmic ROS: negative  ENT ROS: negative  Respiratory ROS: no cough, shortness of breath, or wheezing  Cardiovascular ROS: no chest pain or dyspnea on exertion  Gastrointestinal ROS: no abdominal pain, change in bowel habits, or black or bloody stools  Genito-Urinary ROS: negative  Musculoskeletal ROS: negative  Neurological ROS: negative  Dermatological ROS: negative    OBJECTIVE:   Vitals:   Visit Vitals  /77 (BP 1 Location: Right arm, BP Patient Position: Sitting)   Pulse 74   Temp 97.8 °F (36.6 °C) (Oral)   Resp 16   Ht 5' 6\" (1.676 m)   Wt 187 lb (84.8 kg)   SpO2 96%   BMI 30.18 kg/m²     Gen: Pleasant 81 y.o. female in NAD. HEENT: PERRLA. EOMI. OP moist and pink. EARS: TMs normal and canals equal bilaterally. NECK: Supple. No LAD. No thyromegaly. HEART: RRR, No M/G/R.     LUNGS: CTAB No W/R. ABDOMEN: S, NT, ND, BS+. EXTREMITIES: Warm. No C/C/E.    MUSCULOSKELETAL: Normal ROM, muscle strength 5/5 all groups. NEURO: Alert and oriented x 3. Cranial nerves grossly intact. No focal sensory or motor deficits noted. SKIN: Warm. Dry. No rashes or other lesions noted. ASSESSMENT/ PLAN:     Diagnoses and all orders for this visit:    1. Abnormal x-ray  -     DUPLEX CAROTID BILATERAL; Future    2. Essential hypertension  -     METABOLIC PANEL, COMPREHENSIVE  -     CBC WITH AUTOMATED DIFF    3. Elevated hemoglobin L9J  -     METABOLIC PANEL, COMPREHENSIVE  -     HEMOGLOBIN A1C WITH EAG    4. Medicare annual wellness visit, subsequent  -     [address]    5. Screening for alcoholism  -     DEPRESSION SCREEN ANNUAL    6. Screening for depression  -     DEPRESSION SCREEN ANNUAL    7. Screening for ischemic heart disease  -     LIPID PANEL      1. Medicare Annual Wellness Visit  See attached note. We discussed meeting with a  for guidance on how to exercise and protect her cervical spine. I also encouraged her to continue learning new activities to help her memory. 2. Essential Hypertension  BP seems to be well controlled. I recommended continuing current dose of losartan 50 mg tablet every day and ASA 81 mg tablet every day, eating a low sodium diet, and increasing exercise.  Recheck CMP and CBC.     3. Elevated Hemoglobin A1c  Check HgA1c and CMP. 4. Screening for Alcoholism  See attached note. 5. Screening for Depression  See attached note. 6. Screening for Ischemic Heart Disease  Recheck lipid panel. 7. Abnormal Cervical Spine XR  Prior records were reviewed and discussed with the patient. I ordered a carotid duplex for evaluation of potential plaques noted on cervical spine imaging. Follow-up and Dispositions    · Return in about 1 year (around 2/14/2021) for annual wellness and 6 month follow up. I have reviewed the patient's medications and risks/side effects/benefits were discussed. Diagnosis(-es) explained to patient and questions answered. Literature provided where appropriate.      Written by Ming Fung, as dictated by Rosy Dumont MD.

## 2020-02-14 NOTE — PROGRESS NOTES
This is the Subsequent Medicare Annual Wellness Exam, performed 12 months or more after the Initial AWV or the last Subsequent AWV    I have reviewed the patient's medical history in detail and updated the computerized patient record. 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  Chronic pain     hip rt    Gastrointestinal disorder     GERD    Glaucoma     Hypertension     Ill-defined condition     osteopenia    Ill-defined condition     pulled shoulder lt.  and elbow replacement      Past Surgical History:   Procedure Laterality Date    COLONOSCOPY N/A 9/9/2016    COLONOSCOPY performed by Darnell Ravi [facility] at [facility]  9/9/2016         HX COLONOSCOPY      HX ORTHOPAEDIC      left arm surgery elbow replacement, rods in lt arm     Current Outpatient Medications   Medication Sig Dispense Refill    losartan (COZAAR) 50 mg tablet TAKE 1 TABLET DAILY 90 Tab 4    omeprazole (PRILOSEC) 40 mg capsule TAKE 1 CAPSULE DAILY 60 Cap 6    simvastatin (ZOCOR) 20 mg tablet TAKE 1 TABLET NIGHTLY 90 Tab 4    cholecalciferol, vitamin D3, (VITAMIN D3) 2,000 unit tab Take 1 Tab by mouth daily.  timolol maleate 0.5 % DrpD ophthalmic solution Administer 1 Drop to both eyes daily.  aspirin delayed-release (ASPIR-LOW) 81 mg tablet Take 81 mg by mouth daily. Indications: CEREBRAL THROMBOEMBOLISM PREVENTION, PREVENTION OF TRANSIENT ISCHEMIC ATTACKS       Allergies   Allergen Reactions    Fosamax [Alendronate] Nausea Only       Family History   Problem Relation Age of Onset    No Known Problems Mother     Cancer Father      Social History     Tobacco Use    Smoking status: Former Smoker     Last attempt to quit: 1959     Years since quittin.1    Smokeless tobacco: Never Used   Substance Use Topics    Alcohol use: Yes     Alcohol/week: 0.8 standard drinks     Types: 1 Glasses of wine per week     Comment: socially       Depression Risk Factor Screening:     3 most recent PHQ Screens 2020   Little interest or pleasure in doing things Not at all   Feeling down, depressed, irritable, or hopeless Not at all   Total Score PHQ 2 0       Alcohol Risk Factor Screening:   Do you average 1 drink per night or more than 7 drinks a week:  No    On any one occasion in the past three months have you have had more than 3 drinks containing alcohol:  No      Functional Ability and Level of Safety:   Hearing: The patient wears hearing aids. Activities of Daily Living: The home contains: no safety equipment. Patient does total self care    Ambulation: with no difficulty    Fall Risk:  Fall Risk Assessment, last 12 mths 2020   Able to walk? Yes   Fall in past 12 months?  No       Abuse Screen:  Patient is not abused    Cognitive Screening   Has your family/caregiver stated any concerns about your memory: yes      Patient Care Team   Patient Care Team:  Anali Hopper MD as PCP - General (Family Practice)  Anali Hopper MD as PCP - REHABILITATION [facility] Empaneled Provider  Rut Ying MD (Ophthalmology)    Assessment/Plan   Education and counseling provided:  Are appropriate based on today's review and evaluation  End-of-Life planning (with patient's consent)  Influenza Vaccine  Cardiovascular screening blood test  Screening for glaucoma    Diagnoses and all orders for this visit:    1. Medicare annual wellness visit, subsequent  -     [facility] 69    2. Essential hypertension    3. Elevated hemoglobin A1c    4. Screening for alcoholism  -     DEPRESSION SCREEN ANNUAL    5. Screening for depression  -     DEPRESSION SCREEN ANNUAL    6. Screening for ischemic heart disease  -     LIPID PANEL        Health Maintenance Due   Topic Date Due    DTaP/Tdap/Td series (1 - Tdap) 11/08/1946    Shingrix Vaccine Age 50> (1 of 2) 11/08/1986    Bone Densitometry (Dexa) Screening  11/08/2001    GLAUCOMA SCREENING Q2Y  10/06/2017    Influenza Age 5 to Adult  08/01/2019    Lipid Screen  01/11/2020    Medicare Yearly Exam  01/12/2020        PREVENTIVE:  Colonoscopy: 9/9/16 (Dr. Jenni Spurling, no f/u indicated)  Pap: declined  Mammogram: declined. I advised pt to do manual checks at home regularly. No FHx of breast cancer. Dexa: declined  PPSV-23: 11/10/2014  Flu: Will Complete. Eye Exam: UTD   ACP-ACP planning continued today. I explained the purpose of the advance medical directive. Patient expressed interest in reviewing material.  I provided the patient with a \"Your Right to Decide\" booklet, [employer] Kit, and a copy of an Advance Directive.   Patient agrees to providing a copy to the office when available.

## 2020-02-15 LAB
ALBUMIN SERPL-MCNC: 4.4 G/DL (ref 3.6–4.6)
ALBUMIN/GLOB SERPL: 1.8 {RATIO} (ref 1.2–2.2)
ALP SERPL-CCNC: 112 IU/L (ref 39–117)
ALT SERPL-CCNC: 15 IU/L (ref 0–32)
AST SERPL-CCNC: 17 IU/L (ref 0–40)
BASOPHILS # BLD AUTO: 0.1 X10E3/UL (ref 0–0.2)
BASOPHILS NFR BLD AUTO: 1 %
BILIRUB SERPL-MCNC: 0.4 MG/DL (ref 0–1.2)
BUN SERPL-MCNC: 19 MG/DL (ref 8–27)
BUN/CREAT SERPL: 23 (ref 12–28)
CALCIUM SERPL-MCNC: 10.1 MG/DL (ref 8.7–10.3)
CHLORIDE SERPL-SCNC: 102 MMOL/L (ref 96–106)
CHOLEST SERPL-MCNC: 168 MG/DL (ref 100–199)
CO2 SERPL-SCNC: 24 MMOL/L (ref 20–29)
CREAT SERPL-MCNC: 0.83 MG/DL (ref 0.57–1)
EOSINOPHIL # BLD AUTO: 0.2 X10E3/UL (ref 0–0.4)
EOSINOPHIL NFR BLD AUTO: 2 %
ERYTHROCYTE [DISTWIDTH] IN BLOOD BY AUTOMATED COUNT: 12.7 % (ref 11.7–15.4)
EST. AVERAGE GLUCOSE BLD GHB EST-MCNC: 137 MG/DL
GLOBULIN SER CALC-MCNC: 2.4 G/DL (ref 1.5–4.5)
GLUCOSE SERPL-MCNC: 111 MG/DL (ref 65–99)
HBA1C MFR BLD: 6.4 % (ref 4.8–5.6)
HCT VFR BLD AUTO: 42.1 % (ref 34–46.6)
HDLC SERPL-MCNC: 43 MG/DL
HGB BLD-MCNC: 14.5 G/DL (ref 11.1–15.9)
IMM GRANULOCYTES # BLD AUTO: 0 X10E3/UL (ref 0–0.1)
IMM GRANULOCYTES NFR BLD AUTO: 0 %
LDLC SERPL CALC-MCNC: 99 MG/DL (ref 0–99)
LYMPHOCYTES # BLD AUTO: 2.1 X10E3/UL (ref 0.7–3.1)
LYMPHOCYTES NFR BLD AUTO: 32 %
MCH RBC QN AUTO: 30.5 PG (ref 26.6–33)
MCHC RBC AUTO-ENTMCNC: 34.4 G/DL (ref 31.5–35.7)
MCV RBC AUTO: 88 FL (ref 79–97)
MONOCYTES # BLD AUTO: 0.5 X10E3/UL (ref 0.1–0.9)
MONOCYTES NFR BLD AUTO: 7 %
NEUTROPHILS # BLD AUTO: 3.9 X10E3/UL (ref 1.4–7)
NEUTROPHILS NFR BLD AUTO: 58 %
PLATELET # BLD AUTO: 234 X10E3/UL (ref 150–450)
POTASSIUM SERPL-SCNC: 4.8 MMOL/L (ref 3.5–5.2)
PROT SERPL-MCNC: 6.8 G/DL (ref 6–8.5)
RBC # BLD AUTO: 4.76 X10E6/UL (ref 3.77–5.28)
SODIUM SERPL-SCNC: 141 MMOL/L (ref 134–144)
TRIGL SERPL-MCNC: 129 MG/DL (ref 0–149)
VLDLC SERPL CALC-MCNC: 26 MG/DL (ref 5–40)
WBC # BLD AUTO: 6.8 X10E3/UL (ref 3.4–10.8)

## 2020-03-01 NOTE — PROGRESS NOTES
CMP:Normal electrolyte levels except for an elevation in the glucose level, normal renal and liver function. A1c:Stable at 6.4 since January 2019.   Lipids: Normal   CBC: Normal

## 2020-03-12 NOTE — PROGRESS NOTES
Called, spoke with Pt. Two pt identifiers confirmed. Pt informed of lab results per Dr. Donnajean Dakins.   Pt informed she still needed to get her Duplex done and if it was safe to get it done. Pt informed she can still make an appt for the duplex and just take the precautions given by the [facility] of washing hands and staying home if sick   Pt stated she will call and make an appt today. Pt verbalized understanding of information discussed w/ no further questions at this time.

## 2020-06-08 DIAGNOSIS — E78.2 MIXED HYPERLIPIDEMIA: ICD-10-CM

## 2020-06-09 RX ORDER — SIMVASTATIN 20 MG/1
TABLET, FILM COATED ORAL
Qty: 90 TAB | Refills: 3 | Status: SHIPPED | OUTPATIENT
Start: 2020-06-09 | End: 2021-12-20 | Stop reason: SDUPTHER

## 2021-03-03 ENCOUNTER — OFFICE VISIT (OUTPATIENT)
Dept: INTERNAL MEDICINE CLINIC | Age: 86
End: 2021-03-03
Payer: MEDICARE

## 2021-03-03 VITALS
SYSTOLIC BLOOD PRESSURE: 126 MMHG | TEMPERATURE: 96.3 F | OXYGEN SATURATION: 97 % | WEIGHT: 188.6 LBS | RESPIRATION RATE: 16 BRPM | HEIGHT: 66 IN | DIASTOLIC BLOOD PRESSURE: 75 MMHG | HEART RATE: 102 BPM | BODY MASS INDEX: 30.31 KG/M2

## 2021-03-03 DIAGNOSIS — R73.09 ELEVATED HEMOGLOBIN A1C: ICD-10-CM

## 2021-03-03 DIAGNOSIS — I10 ESSENTIAL HYPERTENSION: Primary | ICD-10-CM

## 2021-03-03 DIAGNOSIS — E78.2 MIXED HYPERLIPIDEMIA: ICD-10-CM

## 2021-03-03 PROCEDURE — 1101F PT FALLS ASSESS-DOCD LE1/YR: CPT | Performed by: FAMILY MEDICINE

## 2021-03-03 PROCEDURE — G8510 SCR DEP NEG, NO PLAN REQD: HCPCS | Performed by: FAMILY MEDICINE

## 2021-03-03 PROCEDURE — G8754 DIAS BP LESS 90: HCPCS | Performed by: FAMILY MEDICINE

## 2021-03-03 PROCEDURE — 1090F PRES/ABSN URINE INCON ASSESS: CPT | Performed by: FAMILY MEDICINE

## 2021-03-03 PROCEDURE — G8400 PT W/DXA NO RESULTS DOC: HCPCS | Performed by: FAMILY MEDICINE

## 2021-03-03 PROCEDURE — G8417 CALC BMI ABV UP PARAM F/U: HCPCS | Performed by: FAMILY MEDICINE

## 2021-03-03 PROCEDURE — G8536 NO DOC ELDER MAL SCRN: HCPCS | Performed by: FAMILY MEDICINE

## 2021-03-03 PROCEDURE — G0463 HOSPITAL OUTPT CLINIC VISIT: HCPCS | Performed by: FAMILY MEDICINE

## 2021-03-03 PROCEDURE — G8427 DOCREV CUR MEDS BY ELIG CLIN: HCPCS | Performed by: FAMILY MEDICINE

## 2021-03-03 PROCEDURE — 99213 OFFICE O/P EST LOW 20 MIN: CPT | Performed by: FAMILY MEDICINE

## 2021-03-03 PROCEDURE — G8752 SYS BP LESS 140: HCPCS | Performed by: FAMILY MEDICINE

## 2021-03-03 NOTE — PROGRESS NOTES
SUBJECTIVE:   Ms. Courtney Trinidad is a 81 y.o. female who is here for follow up of routine medical issues and lab work. HTN: Pt's BP in office today was 126/75. She admits she has not been exercising as much recently but is able to take the stairs leading to her 3rd story home with no problems. Elevated HbA1c: She admits she eats sweets but tries to eat balanced meals. She reports taking the Alzheimer's test yesterday and completed it with no issues. Pt reports issues with the Pessary ring. She has discontinued the Pessary and now uses pads at night and exercises to help strengthen her pelvic floor. Pt notes her reflux is doing well and is compliant in taking Prilosec 40 mg capsule daily. She endorses avoiding chocolate at night. She notes her right foot is occasionally numb and uncomfortable but denies worsening sxs. PREVENTIVE:  COVID-19: 2 of 2 completed. Eye Exam: UTD. At this time, she is otherwise doing well and has brought no other complaints to my attention today. For a list of the medical issues addressed today, see the assessment and plan below. PMH:   Past Medical History:   Diagnosis Date    Adverse effect of anesthesia 2005    took a long time to go to sleep with last colonoscopy    CAD (coronary artery disease) 2010    MI?  Chronic pain     hip rt    Gastrointestinal disorder     GERD    Glaucoma     Hypertension     Ill-defined condition     osteopenia    Ill-defined condition     pulled shoulder lt. and elbow replacement     PSH:  has a past surgical history that includes hx colonoscopy; colonoscopy,diagnostic (9/9/2016); colonoscopy (N/A, 9/9/2016); and hx orthopaedic. All: is allergic to fosamax [alendronate].    MEDS:   Current Outpatient Medications   Medication Sig    losartan (COZAAR) 50 mg tablet TAKE ONE TABLET BY MOUTH DAILY    omeprazole (PRILOSEC) 40 mg capsule TAKE 1 CAPSULE DAILY    simvastatin (ZOCOR) 20 mg tablet TAKE 1 TABLET NIGHTLY    cholecalciferol, vitamin D3, (VITAMIN D3) 2,000 unit tab Take 1 Tab by mouth daily.  timolol maleate 0.5 % DrpD ophthalmic solution Administer 1 Drop to both eyes daily.  aspirin delayed-release (ASPIR-LOW) 81 mg tablet Take 81 mg by mouth daily. Indications: CEREBRAL THROMBOEMBOLISM PREVENTION, PREVENTION OF TRANSIENT ISCHEMIC ATTACKS     No current facility-administered medications for this visit. FH: family history includes Cancer in her father; No Known Problems in her mother. SH:  reports that she quit smoking about 62 years ago. She has never used smokeless tobacco. She reports current alcohol use of about 0.8 standard drinks of alcohol per week. She reports that she does not use drugs. Review of Systems - History obtained from the patient  General ROS: no fever, chills, fatigue, body aches  Psychological ROS: no change in anxiety, depression, SI/HI  Ophthalmic ROS: no blurred vision, myopia, double vision  ENT ROS: no dysphagia, otalgia, otorrhea, rhinorrhea, post nasal drip  Respiratory ROS: no cough, shortness of breath, or wheezing  Cardiovascular ROS: no chest pain or dyspnea on exertion  Gastrointestinal ROS: no abdominal pain, change in bowel habits, or black or bloody stools  Genito-Urinary ROS: no frequency, urgency, incontinence, dysuria, hematuria  Musculoskeletal ROS: no arthralgia, myalgia  Neurological ROS: no headaches, dizziness, lightheadedness, tremors, seizures  Dermatological ROS: no rash or lesions    OBJECTIVE:   Vitals:   Visit Vitals  /75 (BP 1 Location: Left upper arm, BP Patient Position: Sitting, BP Cuff Size: Adult)   Pulse (!) 102   Temp (!) 96.3 °F (35.7 °C) (Temporal)   Resp 16   Ht 5' 6\" (1.676 m)   Wt 188 lb 9.6 oz (85.5 kg)   SpO2 97%   BMI 30.44 kg/m²      Gen: Pleasant 81 y.o.  female in NAD. HEART: RRR, No M/G/R.      LUNGS: CTAB No W/R. ABDOMEN: S, NT, ND, BS+. NEURO: Alert and oriented x 3. Cranial nerves grossly intact.   No focal sensory or motor deficits noted. ASSESSMENT/ PLAN: Diagnoses and all orders for this visit:    1. Essential hypertension  -     CBC WITH AUTOMATED DIFF  -     METABOLIC PANEL, COMPREHENSIVE    2. Mixed hyperlipidemia  -     LIPID PANEL    3. Elevated hemoglobin A1c  -     HEMOGLOBIN A1C WITH EAG    1. Essential Hypertension  BP seems to be well controlled. I recommended continuing current dose of losartan 50 mg tablet daily, eating a low sodium diet, and increasing exercise. Recheck CBC and CMP. 2. Mixed Hyperlipidemia   I recommended continuing current dose of simvastatin 20 mg tablet daily, eating a low fat diet, and increasing exercise. Recheck lipid panel. 3. Elevated Hemoglobin A1c  Recheck A1c. I informed her of the different exercises and methods to help strengthen her pelvic floor. ICD-10-CM ICD-9-CM    1. Essential hypertension  I10 401.9 CBC WITH AUTOMATED DIFF      METABOLIC PANEL, COMPREHENSIVE   2. Mixed hyperlipidemia  E78.2 272.2 LIPID PANEL   3. Elevated hemoglobin A1c  R73.09 790.29 HEMOGLOBIN A1C WITH EAG        Follow-up and Dispositions    · Return in about 6 months (around 9/3/2021) for follow up. I have reviewed the patient's medications and risks/side effects/benefits were discussed. Diagnosis(-es) explained to patient and questions answered. Literature provided where appropriate.      Written by Reid Libman, as dictated by Gayathri Dc MD.

## 2021-04-22 DIAGNOSIS — I10 ESSENTIAL HYPERTENSION: ICD-10-CM

## 2021-04-22 NOTE — TELEPHONE ENCOUNTER
90 day mail order [facility]     Pt is also asking to have the prilosec changed to the medication (forgot name) that you discussed at visit on 3-3-21. She would like a 90 day supply of this to go to [facility] as well. Call pt with any questions.

## 2021-04-23 ENCOUNTER — HOSPITAL ENCOUNTER (OUTPATIENT)
Dept: LAB | Age: 86
Discharge: HOME | End: 2021-04-23
Payer: MEDICARE

## 2021-04-23 PROCEDURE — 85025 COMPLETE CBC W/AUTO DIFF WBC: CPT

## 2021-04-23 PROCEDURE — 80061 LIPID PANEL: CPT

## 2021-04-23 PROCEDURE — 80053 COMPREHEN METABOLIC PANEL: CPT

## 2021-04-23 PROCEDURE — 36415 COLL VENOUS BLD VENIPUNCTURE: CPT

## 2021-04-23 PROCEDURE — 83036 HEMOGLOBIN GLYCOSYLATED A1C: CPT

## 2021-04-23 RX ORDER — LOSARTAN POTASSIUM 50 MG/1
TABLET ORAL
Qty: 90 TAB | Refills: 1 | Status: SHIPPED | OUTPATIENT
Start: 2021-04-23 | End: 2021-10-13

## 2021-04-23 NOTE — TELEPHONE ENCOUNTER
Future Appointments:  No future appointments.      Last Appointment With Me:  3/3/2021     Requested Prescriptions     Pending Prescriptions Disp Refills    losartan (COZAAR) 50 mg tablet 90 Tab 1     Sig: TAKE ONE TABLET BY MOUTH DAILY

## 2021-04-24 LAB
ALBUMIN SERPL-MCNC: 4.4 G/DL (ref 3.6–4.6)
ALBUMIN/GLOB SERPL: 2.2 {RATIO} (ref 1.2–2.2)
ALP SERPL-CCNC: 97 IU/L (ref 39–117)
ALT SERPL-CCNC: 18 IU/L (ref 0–32)
AST SERPL-CCNC: 19 IU/L (ref 0–40)
BASOPHILS # BLD AUTO: 0 X10E3/UL (ref 0–0.2)
BASOPHILS NFR BLD AUTO: 1 %
BILIRUB SERPL-MCNC: 0.4 MG/DL (ref 0–1.2)
BUN SERPL-MCNC: 21 MG/DL (ref 8–27)
BUN/CREAT SERPL: 24 (ref 12–28)
CALCIUM SERPL-MCNC: 10.1 MG/DL (ref 8.7–10.3)
CHLORIDE SERPL-SCNC: 108 MMOL/L (ref 96–106)
CHOLEST SERPL-MCNC: 145 MG/DL (ref 100–199)
CO2 SERPL-SCNC: 27 MMOL/L (ref 20–29)
CREAT SERPL-MCNC: 0.89 MG/DL (ref 0.57–1)
EOSINOPHIL # BLD AUTO: 0.2 X10E3/UL (ref 0–0.4)
EOSINOPHIL NFR BLD AUTO: 3 %
ERYTHROCYTE [DISTWIDTH] IN BLOOD BY AUTOMATED COUNT: 12.6 % (ref 11.7–15.4)
EST. AVERAGE GLUCOSE BLD GHB EST-MCNC: 128 MG/DL
GLOBULIN SER CALC-MCNC: 2 G/DL (ref 1.5–4.5)
GLUCOSE SERPL-MCNC: 127 MG/DL (ref 65–99)
HBA1C MFR BLD: 6.1 % (ref 4.8–5.6)
HCT VFR BLD AUTO: 42.8 % (ref 34–46.6)
HDLC SERPL-MCNC: 44 MG/DL
HGB BLD-MCNC: 14.3 G/DL (ref 11.1–15.9)
IMM GRANULOCYTES # BLD AUTO: 0 X10E3/UL (ref 0–0.1)
IMM GRANULOCYTES NFR BLD AUTO: 0 %
LDLC SERPL CALC-MCNC: 77 MG/DL (ref 0–99)
LYMPHOCYTES # BLD AUTO: 2.5 X10E3/UL (ref 0.7–3.1)
LYMPHOCYTES NFR BLD AUTO: 42 %
MCH RBC QN AUTO: 31.1 PG (ref 26.6–33)
MCHC RBC AUTO-ENTMCNC: 33.4 G/DL (ref 31.5–35.7)
MCV RBC AUTO: 93 FL (ref 79–97)
MONOCYTES # BLD AUTO: 0.4 X10E3/UL (ref 0.1–0.9)
MONOCYTES NFR BLD AUTO: 7 %
NEUTROPHILS # BLD AUTO: 2.9 X10E3/UL (ref 1.4–7)
NEUTROPHILS NFR BLD AUTO: 47 %
PLATELET # BLD AUTO: 198 X10E3/UL (ref 150–450)
POTASSIUM SERPL-SCNC: 4.9 MMOL/L (ref 3.5–5.2)
PROT SERPL-MCNC: 6.4 G/DL (ref 6–8.5)
RBC # BLD AUTO: 4.6 X10E6/UL (ref 3.77–5.28)
SODIUM SERPL-SCNC: 147 MMOL/L (ref 134–144)
TRIGL SERPL-MCNC: 133 MG/DL (ref 0–149)
VLDLC SERPL CALC-MCNC: 24 MG/DL (ref 5–40)
WBC # BLD AUTO: 6 X10E3/UL (ref 3.4–10.8)

## 2021-05-02 NOTE — PROGRESS NOTES
CMP:Normal electrolyte levels except for an elevation in the glucose, sodium, and chloride levels. You have normal renal and liver function. Hemoglobin A1c: Improved  Your current hgbA1c of 6.1 is lower than your last level of 6.4. Keep up the good work! Continue to work on following a diabetic diet and exercise. Recheck this test: hgbA1c  in  3 months. Continue with current  medications. CBC:Normal red and white blood cell levels.   Lipid panel: Normal

## 2021-05-03 ENCOUNTER — TELEPHONE (OUTPATIENT)
Dept: INTERNAL MEDICINE CLINIC | Age: 86
End: 2021-05-03

## 2021-05-03 NOTE — TELEPHONE ENCOUNTER
----- Message from Varinder Rothman MD sent at 5/2/2021  2:56 PM EDT -----  CMP:Normal electrolyte levels except for an elevation in the glucose, sodium, and chloride levels. You have normal renal and liver function. Hemoglobin A1c: Improved  Your current hgbA1c of 6.1 is lower than your last level of 6.4. Keep up the good work! Continue to work on following a diabetic diet and exercise. Recheck this test: hgbA1c  in  3 months. Continue with current  medications. CBC:Normal red and white blood cell levels.   Lipid panel: Normal

## 2021-07-09 ENCOUNTER — TELEPHONE (OUTPATIENT)
Dept: INTERNAL MEDICINE CLINIC | Age: 86
End: 2021-07-09

## 2021-07-09 NOTE — TELEPHONE ENCOUNTER
Patient states she needs a call back in reference to possible reaction to Antibiotics w/sulfate that patient thinks she could possibly have thrush in her mouth. Patient states she was taking the antibiotics for a UTI & finished on Tuesday & has had increasing pain on the end of her tongue since the weekend at the end of the antibiotics regimen. Please call to discuss if something needs to be prescribed or if appt required.  Thank you

## 2021-07-09 NOTE — TELEPHONE ENCOUNTER
Identified patient 2 identifiers verified. Patient Shai Michelle been seen on 7/12 with Dr. Nicole Serrano and go to ED if symptoms worsens.

## 2021-07-12 ENCOUNTER — OFFICE VISIT (OUTPATIENT)
Dept: INTERNAL MEDICINE CLINIC | Age: 86
End: 2021-07-12
Payer: MEDICARE

## 2021-07-12 VITALS
HEART RATE: 65 BPM | OXYGEN SATURATION: 95 % | HEIGHT: 66 IN | BODY MASS INDEX: 28.54 KG/M2 | WEIGHT: 177.6 LBS | DIASTOLIC BLOOD PRESSURE: 61 MMHG | RESPIRATION RATE: 14 BRPM | SYSTOLIC BLOOD PRESSURE: 106 MMHG

## 2021-07-12 DIAGNOSIS — T50.905A ADVERSE EFFECT OF DRUG, INITIAL ENCOUNTER: ICD-10-CM

## 2021-07-12 DIAGNOSIS — B37.0 THRUSH, ORAL: Primary | ICD-10-CM

## 2021-07-12 DIAGNOSIS — I10 ESSENTIAL HYPERTENSION: ICD-10-CM

## 2021-07-12 LAB
BASOPHILS # BLD: 0.1 K/UL (ref 0–0.1)
BASOPHILS NFR BLD: 1 % (ref 0–1)
DIFFERENTIAL METHOD BLD: NORMAL
EOSINOPHIL # BLD: 0.1 K/UL (ref 0–0.4)
EOSINOPHIL NFR BLD: 2 % (ref 0–7)
ERYTHROCYTE [DISTWIDTH] IN BLOOD BY AUTOMATED COUNT: 11.9 % (ref 11.5–14.5)
HCT VFR BLD AUTO: 44.5 % (ref 35–47)
HGB BLD-MCNC: 14.3 G/DL (ref 11.5–16)
IMM GRANULOCYTES # BLD AUTO: 0 K/UL (ref 0–0.04)
IMM GRANULOCYTES NFR BLD AUTO: 0 % (ref 0–0.5)
LYMPHOCYTES # BLD: 2.5 K/UL (ref 0.8–3.5)
LYMPHOCYTES NFR BLD: 33 % (ref 12–49)
MCH RBC QN AUTO: 30.4 PG (ref 26–34)
MCHC RBC AUTO-ENTMCNC: 32.1 G/DL (ref 30–36.5)
MCV RBC AUTO: 94.5 FL (ref 80–99)
MONOCYTES # BLD: 0.5 K/UL (ref 0–1)
MONOCYTES NFR BLD: 7 % (ref 5–13)
NEUTS SEG # BLD: 4.3 K/UL (ref 1.8–8)
NEUTS SEG NFR BLD: 57 % (ref 32–75)
NRBC # BLD: 0 K/UL (ref 0–0.01)
NRBC BLD-RTO: 0 PER 100 WBC
PLATELET # BLD AUTO: 226 K/UL (ref 150–400)
PMV BLD AUTO: 10.6 FL (ref 8.9–12.9)
RBC # BLD AUTO: 4.71 M/UL (ref 3.8–5.2)
WBC # BLD AUTO: 7.6 K/UL (ref 3.6–11)

## 2021-07-12 PROCEDURE — 1101F PT FALLS ASSESS-DOCD LE1/YR: CPT | Performed by: FAMILY MEDICINE

## 2021-07-12 PROCEDURE — 1090F PRES/ABSN URINE INCON ASSESS: CPT | Performed by: FAMILY MEDICINE

## 2021-07-12 PROCEDURE — G8427 DOCREV CUR MEDS BY ELIG CLIN: HCPCS | Performed by: FAMILY MEDICINE

## 2021-07-12 PROCEDURE — G8510 SCR DEP NEG, NO PLAN REQD: HCPCS | Performed by: FAMILY MEDICINE

## 2021-07-12 PROCEDURE — G8400 PT W/DXA NO RESULTS DOC: HCPCS | Performed by: FAMILY MEDICINE

## 2021-07-12 PROCEDURE — G8752 SYS BP LESS 140: HCPCS | Performed by: FAMILY MEDICINE

## 2021-07-12 PROCEDURE — 99214 OFFICE O/P EST MOD 30 MIN: CPT | Performed by: FAMILY MEDICINE

## 2021-07-12 PROCEDURE — G8536 NO DOC ELDER MAL SCRN: HCPCS | Performed by: FAMILY MEDICINE

## 2021-07-12 PROCEDURE — G8417 CALC BMI ABV UP PARAM F/U: HCPCS | Performed by: FAMILY MEDICINE

## 2021-07-12 PROCEDURE — G0463 HOSPITAL OUTPT CLINIC VISIT: HCPCS | Performed by: FAMILY MEDICINE

## 2021-07-12 PROCEDURE — G8754 DIAS BP LESS 90: HCPCS | Performed by: FAMILY MEDICINE

## 2021-07-12 RX ORDER — NYSTATIN 100000 [USP'U]/ML
1 SUSPENSION ORAL 4 TIMES DAILY
Qty: 60 ML | Refills: 1 | Status: SHIPPED | OUTPATIENT
Start: 2021-07-12 | End: 2021-10-13

## 2021-07-12 NOTE — PROGRESS NOTES
SUBJECTIVE:   Ms. Benedicto Almanzar is a 81 y.o. female who is here for a medication reaction. Thrush, oral: Pt reports an allergic reaction to Sulfamethoxazole. She notes feeling like there is thrust present in the mouth. Pt indicates her tongue is coated in the morning but seems to be getting better. She remarks her mouth feels tight almost like she has received Botox. Pt endorses having a healthy appetite but has trouble eating d/t the discomfort. HTN: Pt's BP in office today is 145/72. She notes her BP at home runs in 130's/60's. Pt remarks staying active by walking and staying busy. Pt indicates taking Prilosec 20 mg symptomatically. She reports the OTC Prilosec has been working well for her. Pt reports a visit with Dr. Serenity Becker (Urology and Gynecology) on 07/13/2021 and 09/01/2021. At this time, she is otherwise doing well and has brought no other complaints to my attention today. For a list of the medical issues addressed today, see the assessment and plan below. PMH:   Past Medical History:   Diagnosis Date    Adverse effect of anesthesia 2005    took a long time to go to sleep with last colonoscopy    CAD (coronary artery disease) 2010    MI?  Chronic pain     hip rt    Gastrointestinal disorder     GERD    Glaucoma     Hypertension     Ill-defined condition     osteopenia    Ill-defined condition     pulled shoulder lt. and elbow replacement     PSH:  has a past surgical history that includes hx colonoscopy; colonoscopy,diagnostic (9/9/2016); colonoscopy (N/A, 9/9/2016); and hx orthopaedic. All: is allergic to fosamax [alendronate] and sulfamethoxazole-trimethoprim. MEDS:   Current Outpatient Medications   Medication Sig    nystatin (MYCOSTATIN) 100,000 unit/mL suspension Take 5 mL by mouth four (4) times daily.  swish and spit    losartan (COZAAR) 50 mg tablet TAKE ONE TABLET BY MOUTH DAILY    simvastatin (ZOCOR) 20 mg tablet TAKE 1 TABLET NIGHTLY    cholecalciferol, vitamin D3, (VITAMIN D3) 2,000 unit tab Take 1 Tab by mouth daily.  timolol maleate 0.5 % DrpD ophthalmic solution Administer 1 Drop to both eyes daily.  aspirin delayed-release (ASPIR-LOW) 81 mg tablet Take 81 mg by mouth daily. Indications: CEREBRAL THROMBOEMBOLISM PREVENTION, PREVENTION OF TRANSIENT ISCHEMIC ATTACKS     No current facility-administered medications for this visit. FH: family history includes Cancer in her father; No Known Problems in her mother. SH:  reports that she quit smoking about 62 years ago. She has never used smokeless tobacco. She reports current alcohol use of about 0.8 standard drinks of alcohol per week. She reports that she does not use drugs. Review of Systems - History obtained from the patient  General ROS: no fever, chills, fatigue, body aches  Psychological ROS: no change in anxiety, depression, SI/HI  Ophthalmic ROS: no blurred vision, myopia, double vision  ENT ROS: no dysphagia, otalgia, otorrhea, rhinorrhea, post nasal drip  Mouth: + thrush on tongue, inflammation in upper lip  Respiratory ROS: no cough, shortness of breath, or wheezing  Cardiovascular ROS: no chest pain or dyspnea on exertion  Gastrointestinal ROS: no abdominal pain, change in bowel habits, or black or bloody stools  Genito-Urinary ROS: no frequency, urgency, incontinence, dysuria, hematuria  Musculoskeletal ROS: no arthralgia, myalgia  Neurological ROS: no headaches, dizziness, lightheadedness, tremors, seizures  Dermatological ROS: no rash or lesions    OBJECTIVE:   Vitals:   Visit Vitals  BP (!) 145/72 (BP 1 Location: Left arm, BP Patient Position: Sitting, BP Cuff Size: Adult)   Pulse 68   Resp 14   Ht 5' 6\" (1.676 m)   Wt 177 lb 9.6 oz (80.6 kg)   SpO2 95%   BMI 28.67 kg/m²      Gen: Pleasant 81 y.o.  female in NAD. MOUTH: thrush present on tongue, mild inflammation in upper lip. HEART: RRR, No M/G/R.      LUNGS: CTAB No W/R.    NEURO: Alert and oriented x 3. Cranial nerves grossly intact. No focal sensory or motor deficits noted. ASSESSMENT/ PLAN: Diagnoses and all orders for this visit:    1. Thrush, oral  -     nystatin (MYCOSTATIN) 100,000 unit/mL suspension; Take 5 mL by mouth four (4) times daily. swish and spit  -     CBC WITH AUTOMATED DIFF; Future    2. Essential hypertension    3. Adverse effect of drug, initial encounter  -     CBC WITH AUTOMATED DIFF; Future      1. Romayne Plenty, oral   I will send a prescription over to help alleviate thrush. Prescribed nystatin 100,000 unit/mL suspension. I requested the pt to message me on MyChart as a way to let me know how she is feeling. Recheck CBC. 2. Essential Hypertension  I recommended continuing current dose of losartan 50 mg tablet, eating a low sodium diet, and increasing exercise. 3. Adverse effect of drug   I updated the pt's allergy list during today's visit. Recheck CBC. ICD-10-CM ICD-9-CM    1. Thrush, oral  B37.0 112.0 nystatin (MYCOSTATIN) 100,000 unit/mL suspension      CBC WITH AUTOMATED DIFF   2. Essential hypertension  I10 401.9    3. Adverse effect of drug, initial encounter  T50.905A E947.9 CBC WITH AUTOMATED DIFF      Follow-up and Dispositions    · Return in about 6 months (around 1/12/2022) for follow up. I have reviewed the patient's medications and risks/side effects/benefits were discussed. Diagnosis(-es) explained to patient and questions answered. Literature provided where appropriate.      Written by Curt Ramey, as dictated by Gwen Salinas MD.

## 2021-07-23 NOTE — PROGRESS NOTES
CMP:Normal red and white blood cell levels. Eosinophils are in the normal range. They are often elevated with allergy reactions.

## 2021-09-14 ENCOUNTER — TELEPHONE (OUTPATIENT)
Dept: INTERNAL MEDICINE CLINIC | Age: 86
End: 2021-09-14

## 2021-09-14 NOTE — TELEPHONE ENCOUNTER
----- Message from Adelia Proctor sent at 9/14/2021  9:13 AM EDT -----  Regarding: Dr. Elvis Villalba first and last name:n/a      Reason for call:Questions regarding a physical appointment before surgery 10/19/21      Callback required yes/no and why:yes,Questions regarding a physical appointment before surgery 10/19/21. Best contact number(s):[phone]      Details to clarify the request:Questions regarding a physical appointment before surgery 10/19/21.     Message from [facility]

## 2021-09-15 NOTE — TELEPHONE ENCOUNTER
Patient was scheduled for 10/13/21 at 1:30pm for pre-op pending approval from Dr. Edd Thao. Dr. Edd Thao approved. Identified patient 2 identifiers verified. Patient confirmed appointment.

## 2021-09-17 ENCOUNTER — TELEPHONE (OUTPATIENT)
Dept: INTERNAL MEDICINE CLINIC | Age: 86
End: 2021-09-17

## 2021-10-06 ENCOUNTER — TRANSCRIBE ORDER (OUTPATIENT)
Dept: REGISTRATION | Age: 86
End: 2021-10-06

## 2021-10-06 DIAGNOSIS — Z01.812 PRE-PROCEDURE LAB EXAM: Primary | ICD-10-CM

## 2021-10-13 ENCOUNTER — OFFICE VISIT (OUTPATIENT)
Dept: INTERNAL MEDICINE CLINIC | Age: 86
End: 2021-10-13
Payer: MEDICARE

## 2021-10-13 ENCOUNTER — HOSPITAL ENCOUNTER (OUTPATIENT)
Dept: PREADMISSION TESTING | Age: 86
Discharge: HOME | End: 2021-10-13
Payer: MEDICARE

## 2021-10-13 VITALS
HEART RATE: 67 BPM | TEMPERATURE: 98.1 F | SYSTOLIC BLOOD PRESSURE: 179 MMHG | BODY MASS INDEX: 27.57 KG/M2 | WEIGHT: 171.52 LBS | HEIGHT: 66 IN | DIASTOLIC BLOOD PRESSURE: 79 MMHG | RESPIRATION RATE: 16 BRPM

## 2021-10-13 VITALS
BODY MASS INDEX: 27 KG/M2 | HEIGHT: 66 IN | HEART RATE: 88 BPM | WEIGHT: 168 LBS | OXYGEN SATURATION: 98 % | DIASTOLIC BLOOD PRESSURE: 71 MMHG | RESPIRATION RATE: 18 BRPM | SYSTOLIC BLOOD PRESSURE: 131 MMHG | TEMPERATURE: 97.8 F

## 2021-10-13 DIAGNOSIS — Z01.818 PRE-OP EVALUATION: Primary | ICD-10-CM

## 2021-10-13 LAB
ALBUMIN SERPL-MCNC: 4 G/DL (ref 3.5–5)
ALBUMIN/GLOB SERPL: 1.3 {RATIO} (ref 1.1–2.2)
ALP SERPL-CCNC: 113 U/L (ref 45–117)
ALT SERPL-CCNC: 26 U/L (ref 12–78)
ANION GAP SERPL CALC-SCNC: 5 MMOL/L (ref 5–15)
APPEARANCE UR: CLEAR
APTT PPP: 26.8 SEC (ref 22.1–31)
AST SERPL-CCNC: 21 U/L (ref 15–37)
ATRIAL RATE: 64 BPM
BACTERIA URNS QL MICRO: NEGATIVE /HPF
BASOPHILS # BLD: 0.1 K/UL (ref 0–0.1)
BASOPHILS NFR BLD: 1 % (ref 0–1)
BILIRUB SERPL-MCNC: 0.5 MG/DL (ref 0.2–1)
BILIRUB UR QL: NEGATIVE
BUN SERPL-MCNC: 29 MG/DL (ref 6–20)
BUN/CREAT SERPL: 33 (ref 12–20)
CALCIUM SERPL-MCNC: 10.1 MG/DL (ref 8.5–10.1)
CALCULATED P AXIS, ECG09: 25 DEGREES
CALCULATED R AXIS, ECG10: -3 DEGREES
CALCULATED T AXIS, ECG11: 24 DEGREES
CHLORIDE SERPL-SCNC: 104 MMOL/L (ref 97–108)
CO2 SERPL-SCNC: 29 MMOL/L (ref 21–32)
COLOR UR: ABNORMAL
CREAT SERPL-MCNC: 0.87 MG/DL (ref 0.55–1.02)
DIAGNOSIS, 93000: NORMAL
DIFFERENTIAL METHOD BLD: NORMAL
EOSINOPHIL # BLD: 0.2 K/UL (ref 0–0.4)
EOSINOPHIL NFR BLD: 2 % (ref 0–7)
EPITH CASTS URNS QL MICRO: ABNORMAL /LPF
ERYTHROCYTE [DISTWIDTH] IN BLOOD BY AUTOMATED COUNT: 13 % (ref 11.5–14.5)
GLOBULIN SER CALC-MCNC: 3 G/DL (ref 2–4)
GLUCOSE SERPL-MCNC: 114 MG/DL (ref 65–100)
GLUCOSE UR STRIP.AUTO-MCNC: NEGATIVE MG/DL
HCT VFR BLD AUTO: 45.3 % (ref 35–47)
HGB BLD-MCNC: 14.4 G/DL (ref 11.5–16)
HGB UR QL STRIP: NEGATIVE
HYALINE CASTS URNS QL MICRO: ABNORMAL /LPF (ref 0–5)
IMM GRANULOCYTES # BLD AUTO: 0 K/UL (ref 0–0.04)
IMM GRANULOCYTES NFR BLD AUTO: 0 % (ref 0–0.5)
INR PPP: 1 (ref 0.9–1.1)
KETONES UR QL STRIP.AUTO: NEGATIVE MG/DL
LEUKOCYTE ESTERASE UR QL STRIP.AUTO: ABNORMAL
LYMPHOCYTES # BLD: 2.5 K/UL (ref 0.8–3.5)
LYMPHOCYTES NFR BLD: 33 % (ref 12–49)
MCH RBC QN AUTO: 30.3 PG (ref 26–34)
MCHC RBC AUTO-ENTMCNC: 31.8 G/DL (ref 30–36.5)
MCV RBC AUTO: 95.4 FL (ref 80–99)
MONOCYTES # BLD: 0.5 K/UL (ref 0–1)
MONOCYTES NFR BLD: 7 % (ref 5–13)
NEUTS SEG # BLD: 4.4 K/UL (ref 1.8–8)
NEUTS SEG NFR BLD: 57 % (ref 32–75)
NITRITE UR QL STRIP.AUTO: NEGATIVE
NRBC # BLD: 0 K/UL (ref 0–0.01)
NRBC BLD-RTO: 0 PER 100 WBC
P-R INTERVAL, ECG05: 132 MS
PH UR STRIP: 5.5 [PH] (ref 5–8)
PLATELET # BLD AUTO: 214 K/UL (ref 150–400)
PMV BLD AUTO: 11.3 FL (ref 8.9–12.9)
POTASSIUM SERPL-SCNC: 4.4 MMOL/L (ref 3.5–5.1)
PROT SERPL-MCNC: 7 G/DL (ref 6.4–8.2)
PROT UR STRIP-MCNC: NEGATIVE MG/DL
PROTHROMBIN TIME: 10.7 SEC (ref 9–11.1)
Q-T INTERVAL, ECG07: 428 MS
QRS DURATION, ECG06: 84 MS
QTC CALCULATION (BEZET), ECG08: 441 MS
RBC # BLD AUTO: 4.75 M/UL (ref 3.8–5.2)
RBC #/AREA URNS HPF: ABNORMAL /HPF (ref 0–5)
SODIUM SERPL-SCNC: 138 MMOL/L (ref 136–145)
SP GR UR REFRACTOMETRY: 1.01 (ref 1–1.03)
THERAPEUTIC RANGE,PTTT: NORMAL SECS (ref 58–77)
UROBILINOGEN UR QL STRIP.AUTO: 0.2 EU/DL (ref 0.2–1)
VENTRICULAR RATE, ECG03: 64 BPM
WBC # BLD AUTO: 7.6 K/UL (ref 3.6–11)
WBC URNS QL MICRO: ABNORMAL /HPF (ref 0–4)

## 2021-10-13 PROCEDURE — 80053 COMPREHEN METABOLIC PANEL: CPT

## 2021-10-13 PROCEDURE — G8510 SCR DEP NEG, NO PLAN REQD: HCPCS | Performed by: FAMILY MEDICINE

## 2021-10-13 PROCEDURE — 1090F PRES/ABSN URINE INCON ASSESS: CPT | Performed by: FAMILY MEDICINE

## 2021-10-13 PROCEDURE — 85730 THROMBOPLASTIN TIME PARTIAL: CPT

## 2021-10-13 PROCEDURE — G8752 SYS BP LESS 140: HCPCS | Performed by: FAMILY MEDICINE

## 2021-10-13 PROCEDURE — 85610 PROTHROMBIN TIME: CPT

## 2021-10-13 PROCEDURE — G0463 HOSPITAL OUTPT CLINIC VISIT: HCPCS | Performed by: FAMILY MEDICINE

## 2021-10-13 PROCEDURE — G8400 PT W/DXA NO RESULTS DOC: HCPCS | Performed by: FAMILY MEDICINE

## 2021-10-13 PROCEDURE — 87086 URINE CULTURE/COLONY COUNT: CPT

## 2021-10-13 PROCEDURE — 1101F PT FALLS ASSESS-DOCD LE1/YR: CPT | Performed by: FAMILY MEDICINE

## 2021-10-13 PROCEDURE — 99214 OFFICE O/P EST MOD 30 MIN: CPT | Performed by: FAMILY MEDICINE

## 2021-10-13 PROCEDURE — 81001 URINALYSIS AUTO W/SCOPE: CPT

## 2021-10-13 PROCEDURE — G8417 CALC BMI ABV UP PARAM F/U: HCPCS | Performed by: FAMILY MEDICINE

## 2021-10-13 PROCEDURE — G8754 DIAS BP LESS 90: HCPCS | Performed by: FAMILY MEDICINE

## 2021-10-13 PROCEDURE — 85025 COMPLETE CBC W/AUTO DIFF WBC: CPT

## 2021-10-13 PROCEDURE — G8427 DOCREV CUR MEDS BY ELIG CLIN: HCPCS | Performed by: FAMILY MEDICINE

## 2021-10-13 PROCEDURE — 93005 ELECTROCARDIOGRAM TRACING: CPT

## 2021-10-13 PROCEDURE — G8536 NO DOC ELDER MAL SCRN: HCPCS | Performed by: FAMILY MEDICINE

## 2021-10-13 RX ORDER — OMEPRAZOLE 20 MG/1
20 TABLET, DELAYED RELEASE ORAL AS NEEDED
COMMUNITY
End: 2021-11-04

## 2021-10-13 RX ORDER — SODIUM CHLORIDE 50 MG/ML
1 SOLUTION/ DROPS OPHTHALMIC AS NEEDED
COMMUNITY
End: 2021-10-21

## 2021-10-13 RX ORDER — LOSARTAN POTASSIUM 100 MG/1
100 TABLET ORAL EVERY MORNING
COMMUNITY
End: 2021-10-21

## 2021-10-13 NOTE — PERIOP NOTES
Patient given surgical site infection FAQs handout and hand hygiene tips sheet. Pre-operative instructions reviewed and patient verbalizes understanding of instructions. Patient has been given the opportunity to ask additional questions. Pt given 2 bottles of CHG soap and instructed in use. NOTICE OF SERVICES FOR DEAF AND HARD OF HEARING  FORM COMPLETED AND PLACED ON CHART. Pt instructed that they will be scheduled for COVID 19 test 4 days prior to surgery. COVID instruction sheet and instructions given to PT. Pt instructed they must self quarantine between  COVID 19 test and day of surgery. Visitation policy  given to patient. Policy reviewed with patient. COPY OF COVID 19 VACCINE CARD PLACED ON CHART.

## 2021-10-13 NOTE — PROGRESS NOTES
SUBJECTIVE:   Mo Agee is a 81 y.o. female who is here for Pre-op evaluation. Pre-Op Evaluation: Pt reports a procedure scheduled for 10/19/2021 to lift her bladder and uterus. She notes having completed her pre-admit testing this morning. Pt indicates her appetite has returned since her episode of thrush. She remarks running a high fever and abdominal pain on 09/06/21 [facility] found out she had another bladder infection. Pt specifically denies changes in vision or hearing, trouble with swallowing or taste, CP, SOB, heartburn or upset stomach, change in bowel habits, problems urinating, unusual joint or muscle pains, numbness or tingling in extremities, or skin lesions of concern. Pt reports feeling slightly dizzy and fatigued after taking her 100 mg of losartan. She inquires if she should cut the losartan in half. At this time, she is otherwise doing well and has brought no other complaints to my attention today. For a list of the medical issues addressed today, see the assessment and plan below. PMH:   Past Medical History:   Diagnosis Date    Adverse effect of anesthesia 2005    took a long time to go to sleep with last colonoscopy    CAD (coronary artery disease) 2010    MI? 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    Peripheral neuropathy     BOTH FEET    Shingles 2018       PSH:  has a past surgical history that includes hx colonoscopy; colonoscopy,diagnostic (9/9/2016); colonoscopy (N/A, 9/9/2016); hx cataract removal (Bilateral); hx orthopaedic; and hx orthopaedic. Allergies: is allergic to alendronate and sulfamethoxazole-trimethoprim.     Meds:   Current Outpatient Medications   Medication Sig    losartan (COZAAR) 100 mg tablet Take 100 mg by mouth Every morning.  sodium chloride (Walt 128) 5 % ophthalmic solution Administer 1 Drop to both eyes as needed.  omeprazole (PriLOSEC OTC) 20 mg tablet Take 20 mg by mouth as needed.  simvastatin (ZOCOR) 20 mg tablet TAKE 1 TABLET NIGHTLY    cholecalciferol, vitamin D3, (VITAMIN D3) 2,000 unit tab Take 1 Tablet by mouth daily.  timolol maleate 0.5 % DrpD ophthalmic solution Administer 1 Drop to both eyes daily.  aspirin delayed-release (ASPIR-LOW) 81 mg tablet Take 81 mg by mouth daily. Indications: prevention for a blood clot going to the brain, prevention of transient ischemic attack     No current facility-administered medications for this visit. Fam hx: family history includes Atrial Fibrillation in her mother and sister; Cancer in her father; Hypertension in her mother; Other in her brother. Soc hx:  reports that she quit smoking about 62 years ago. She has a 1.00 pack-year smoking history. She has never used smokeless tobacco. She reports current alcohol use. She reports that she does not use drugs. Review of Systems - History obtained from the patient  General ROS: negative  Psychological ROS: negative  Ophthalmic ROS: negative  ENT ROS: negative  Respiratory ROS: no cough, shortness of breath, or wheezing  Cardiovascular ROS: no chest pain or dyspnea on exertion  Gastrointestinal ROS: no abdominal pain, change in bowel habits, or black or bloody stools  Genito-Urinary ROS: negative  Musculoskeletal ROS: negative  Neurological ROS: negative  Dermatological ROS: negative    OBJECTIVE:   Vitals:   Visit Vitals  /71 (BP 1 Location: Left arm, BP Patient Position: Sitting, BP Cuff Size: Adult)   Pulse 88   Temp 97.8 °F (36.6 °C) (Oral)   Resp 18   Ht 5' 5.5\" (1.664 m)   Wt 168 lb (76.2 kg)   SpO2 98%   BMI 27.53 kg/m²     Gen: Pleasant 81 y.o. female in NAD. NECK: Supple. No LAD.  No thyromegaly. HEART: RRR, No M/G/R.     LUNGS: CTAB No W/R. ABDOMEN: S, NT, ND, BS+. NEURO: Alert and oriented x 3. Cranial nerves grossly intact. No focal sensory or motor deficits noted. SKIN: Warm. Dry. No rashes or other lesions noted. ASSESSMENT/ PLAN:     Diagnoses and all orders for this visit:    1. Pre-op evaluation      1. Pre-op evaluation  Pt's physical exam is normal. She is cleared for surgery. I advised that she discontinue her 81 mg Aspirin 5 days prior to the surgery. Her appetite is coming back. I suggested cutting her losartan in half and monitoring her pressure afterwards due her weight loss and reports of her feeling light headed on the medication . I advised her to get her COVID Booster and Flu shot after she has recovered from her surgery. I have reviewed the patient's medications and risks/side effects/benefits were discussed. Diagnosis(-es) explained to patient and questions answered. Literature provided where appropriate.      Written by Oneil Amni, as dictated by Johann John MD.

## 2021-10-13 NOTE — PROGRESS NOTES
Room:     Identified pt with two pt identifiers(name and ). Reviewed record in preparation for visit and have obtained necessary documentation. All patient medications has been reviewed. Chief Complaint   Patient presents with    Pre-op Exam     Planned surg on 10/19/2021     Pt getting surgery at [facility], urinary sling. Pt would like to discuss the flu shot with Dr Prince Rasmussen.     Health Maintenance Due   Topic    DTaP/Tdap/Td series (1 - Tdap)    Shingrix Vaccine Age 49> (1 of 2)    Bone Densitometry (Dexa) Screening     Medicare Yearly Exam     Flu Vaccine (1)       Vitals:    10/13/21 1322   BP: 131/71   Pulse: 88   Resp: 18   Temp: 97.8 °F (36.6 °C)   TempSrc: Oral   SpO2: 98%   Weight: 168 lb (76.2 kg)   Height: 5' 5.5\" (1.664 m)   PainSc:   0 - No pain       4. Have you been to the ER, urgent care clinic since your last visit? Hospitalized since your last visit? No    5. Have you seen or consulted any other health care providers outside of the [address] [place] [address] since your last visit? Include any pap smears or colon screening. No    Patient is accompanied by self I have received verbal consent from [place] to discuss any/all medical information while they are present in the room.

## 2021-10-14 ENCOUNTER — HOSPITAL ENCOUNTER (OUTPATIENT)
Dept: PREADMISSION TESTING | Age: 86
Discharge: HOME | End: 2021-10-14
Payer: MEDICARE

## 2021-10-14 DIAGNOSIS — Z01.812 PRE-PROCEDURE LAB EXAM: ICD-10-CM

## 2021-10-14 LAB
BACTERIA SPEC CULT: NORMAL
CC UR VC: NORMAL
SERVICE CMNT-IMP: NORMAL

## 2021-10-14 PROCEDURE — U0005 INFEC AGEN DETEC AMPLI PROBE: HCPCS

## 2021-10-15 LAB
SARS-COV-2, XPLCVT: NOT DETECTED
SOURCE, COVRS: NORMAL

## 2021-10-18 ENCOUNTER — TELEPHONE (OUTPATIENT)
Dept: INTERNAL MEDICINE CLINIC | Age: 86
End: 2021-10-18

## 2021-10-18 NOTE — TELEPHONE ENCOUNTER
Dr. Renita Coulter's office must have pre op faxed to them asap as pt's procedure/surgery is tomorrow, 10-19-21. Fax #[phone]  Attn: Dr. Steffany Rivera      If this has been done, they did not get it.

## 2021-10-18 NOTE — TELEPHONE ENCOUNTER
I completed this patient's form on on 10/13/2021 prior to going out on PTO on 10/14/2021. I left the form in my box with a note requesting that the form be attached to the note and fax to the patient's surgeon.

## 2021-10-18 NOTE — TELEPHONE ENCOUNTER
Pt calling again stating surgery tomorrow and needs form sent for clearance. Send to: Fax #[phone]  Attn: Dr. Bhumika Hernandez, Nurse Janelle  Phone [phone] per tiara: fax the office notes from surgical clearance visit.

## 2021-10-19 ENCOUNTER — ANESTHESIA EVENT (OUTPATIENT)
Dept: SURGERY | Age: 86
DRG: 983 | End: 2021-10-19
Payer: MEDICARE

## 2021-10-19 ENCOUNTER — ANESTHESIA (OUTPATIENT)
Dept: SURGERY | Age: 86
DRG: 983 | End: 2021-10-19
Payer: MEDICARE

## 2021-10-19 ENCOUNTER — APPOINTMENT (OUTPATIENT)
Dept: GENERAL RADIOLOGY | Age: 86
DRG: 983 | End: 2021-10-19
Attending: EMERGENCY MEDICINE
Payer: MEDICARE

## 2021-10-19 ENCOUNTER — HOSPITAL ENCOUNTER (INPATIENT)
Age: 86
LOS: 2 days | Discharge: HOME | DRG: 983 | End: 2021-10-21
Attending: EMERGENCY MEDICINE | Admitting: FAMILY MEDICINE
Payer: MEDICARE

## 2021-10-19 ENCOUNTER — HOSPITAL ENCOUNTER (OUTPATIENT)
Age: 86
Setting detail: OUTPATIENT SURGERY
Discharge: HOME | DRG: 983 | End: 2021-10-19
Attending: OBSTETRICS & GYNECOLOGY | Admitting: OBSTETRICS & GYNECOLOGY
Payer: MEDICARE

## 2021-10-19 ENCOUNTER — APPOINTMENT (OUTPATIENT)
Dept: CT IMAGING | Age: 86
DRG: 983 | End: 2021-10-19
Attending: FAMILY MEDICINE
Payer: MEDICARE

## 2021-10-19 VITALS
HEART RATE: 57 BPM | DIASTOLIC BLOOD PRESSURE: 136 MMHG | WEIGHT: 167.99 LBS | RESPIRATION RATE: 19 BRPM | OXYGEN SATURATION: 95 % | HEIGHT: 66 IN | TEMPERATURE: 97.6 F | SYSTOLIC BLOOD PRESSURE: 162 MMHG | BODY MASS INDEX: 27 KG/M2

## 2021-10-19 DIAGNOSIS — N81.2 INCOMPLETE UTEROVAGINAL PROLAPSE: Primary | Chronic | ICD-10-CM

## 2021-10-19 DIAGNOSIS — R55 SYNCOPE AND COLLAPSE: Primary | ICD-10-CM

## 2021-10-19 PROBLEM — I63.9 CVA (CEREBRAL VASCULAR ACCIDENT) (HCC): Status: ACTIVE | Noted: 2021-10-19

## 2021-10-19 LAB
ALBUMIN SERPL-MCNC: 3.3 G/DL (ref 3.5–5)
ALBUMIN/GLOB SERPL: 1.1 {RATIO} (ref 1.1–2.2)
ALP SERPL-CCNC: 74 U/L (ref 45–117)
ALT SERPL-CCNC: 26 U/L (ref 12–78)
ANION GAP SERPL CALC-SCNC: 7 MMOL/L (ref 5–15)
AST SERPL-CCNC: 20 U/L (ref 15–37)
BASOPHILS # BLD: 0 K/UL (ref 0–0.1)
BASOPHILS NFR BLD: 0 % (ref 0–1)
BILIRUB SERPL-MCNC: 0.4 MG/DL (ref 0.2–1)
BUN SERPL-MCNC: 24 MG/DL (ref 6–20)
BUN/CREAT SERPL: 32 (ref 12–20)
CALCIUM SERPL-MCNC: 9.5 MG/DL (ref 8.5–10.1)
CHLORIDE SERPL-SCNC: 110 MMOL/L (ref 97–108)
CK SERPL-CCNC: 113 U/L (ref 26–192)
CO2 SERPL-SCNC: 22 MMOL/L (ref 21–32)
COMMENT, HOLDF: NORMAL
CREAT SERPL-MCNC: 0.76 MG/DL (ref 0.55–1.02)
D DIMER PPP FEU-MCNC: 2.62 MG/L FEU (ref 0–0.65)
DIFFERENTIAL METHOD BLD: ABNORMAL
EOSINOPHIL # BLD: 0 K/UL (ref 0–0.4)
EOSINOPHIL NFR BLD: 0 % (ref 0–7)
ERYTHROCYTE [DISTWIDTH] IN BLOOD BY AUTOMATED COUNT: 13 % (ref 11.5–14.5)
GLOBULIN SER CALC-MCNC: 2.9 G/DL (ref 2–4)
GLUCOSE SERPL-MCNC: 187 MG/DL (ref 65–100)
HCT VFR BLD AUTO: 37.2 % (ref 35–47)
HGB BLD-MCNC: 12.3 G/DL (ref 11.5–16)
IMM GRANULOCYTES # BLD AUTO: 0.2 K/UL (ref 0–0.04)
IMM GRANULOCYTES NFR BLD AUTO: 1 % (ref 0–0.5)
LYMPHOCYTES # BLD: 1.1 K/UL (ref 0.8–3.5)
LYMPHOCYTES NFR BLD: 7 % (ref 12–49)
MAGNESIUM SERPL-MCNC: 2 MG/DL (ref 1.6–2.4)
MCH RBC QN AUTO: 30.8 PG (ref 26–34)
MCHC RBC AUTO-ENTMCNC: 33.1 G/DL (ref 30–36.5)
MCV RBC AUTO: 93 FL (ref 80–99)
MONOCYTES # BLD: 0.3 K/UL (ref 0–1)
MONOCYTES NFR BLD: 2 % (ref 5–13)
NEUTS SEG # BLD: 14.7 K/UL (ref 1.8–8)
NEUTS SEG NFR BLD: 90 % (ref 32–75)
NRBC # BLD: 0 K/UL (ref 0–0.01)
NRBC BLD-RTO: 0 PER 100 WBC
PLATELET # BLD AUTO: 199 K/UL (ref 150–400)
PMV BLD AUTO: 10.6 FL (ref 8.9–12.9)
POTASSIUM SERPL-SCNC: 4.2 MMOL/L (ref 3.5–5.1)
PROT SERPL-MCNC: 6.2 G/DL (ref 6.4–8.2)
RBC # BLD AUTO: 4 M/UL (ref 3.8–5.2)
RBC MORPH BLD: ABNORMAL
SAMPLES BEING HELD,HOLD: NORMAL
SODIUM SERPL-SCNC: 139 MMOL/L (ref 136–145)
TROPONIN-HIGH SENSITIVITY: 4 NG/L (ref 0–51)
WBC # BLD AUTO: 16.3 K/UL (ref 3.6–11)

## 2021-10-19 PROCEDURE — 76010000153 HC OR TIME 1.5 TO 2 HR: Performed by: OBSTETRICS & GYNECOLOGY

## 2021-10-19 PROCEDURE — 85379 FIBRIN DEGRADATION QUANT: CPT

## 2021-10-19 PROCEDURE — 0ULG7DZ OCCLUSION OF VAGINA WITH INTRALUMINAL DEVICE, VIA NATURAL OR ARTIFICIAL OPENING: ICD-10-PCS | Performed by: PHYSICIAN ASSISTANT

## 2021-10-19 PROCEDURE — 74011250636 HC RX REV CODE- 250/636: Performed by: OBSTETRICS & GYNECOLOGY

## 2021-10-19 PROCEDURE — 77030019908 HC STETH ESOPH SIMS -A: Performed by: ANESTHESIOLOGY

## 2021-10-19 PROCEDURE — 0UDB7ZZ EXTRACTION OF ENDOMETRIUM, VIA NATURAL OR ARTIFICIAL OPENING: ICD-10-PCS | Performed by: PHYSICIAN ASSISTANT

## 2021-10-19 PROCEDURE — 76060000035 HC ANESTHESIA 2 TO 2.5 HR: Performed by: OBSTETRICS & GYNECOLOGY

## 2021-10-19 PROCEDURE — 85025 COMPLETE CBC W/AUTO DIFF WBC: CPT

## 2021-10-19 PROCEDURE — 77030019927 HC TBNG IRR CYSTO BAXT -A: Performed by: OBSTETRICS & GYNECOLOGY

## 2021-10-19 PROCEDURE — 71045 X-RAY EXAM CHEST 1 VIEW: CPT

## 2021-10-19 PROCEDURE — 74011250637 HC RX REV CODE- 250/637: Performed by: ANESTHESIOLOGY

## 2021-10-19 PROCEDURE — 74011000250 HC RX REV CODE- 250: Performed by: NURSE ANESTHETIST, CERTIFIED REGISTERED

## 2021-10-19 PROCEDURE — 51798 US URINE CAPACITY MEASURE: CPT

## 2021-10-19 PROCEDURE — 77030040922 HC BLNKT HYPOTHRM STRY -A

## 2021-10-19 PROCEDURE — 77030013079 HC BLNKT BAIR HGGR 3M -A: Performed by: ANESTHESIOLOGY

## 2021-10-19 PROCEDURE — C1771 REP DEV, URINARY, W/SLING: HCPCS | Performed by: OBSTETRICS & GYNECOLOGY

## 2021-10-19 PROCEDURE — 0TJB8ZZ INSPECTION OF BLADDER, VIA NATURAL OR ARTIFICIAL OPENING ENDOSCOPIC: ICD-10-PCS | Performed by: PHYSICIAN ASSISTANT

## 2021-10-19 PROCEDURE — 77030003666 HC NDL SPINAL BD -A: Performed by: OBSTETRICS & GYNECOLOGY

## 2021-10-19 PROCEDURE — 74011000250 HC RX REV CODE- 250: Performed by: OBSTETRICS & GYNECOLOGY

## 2021-10-19 PROCEDURE — 77030010507 HC ADH SKN DERMBND J&J -B: Performed by: OBSTETRICS & GYNECOLOGY

## 2021-10-19 PROCEDURE — 84484 ASSAY OF TROPONIN QUANT: CPT

## 2021-10-19 PROCEDURE — 77030002966 HC SUT PDS J&J -A: Performed by: OBSTETRICS & GYNECOLOGY

## 2021-10-19 PROCEDURE — 0HQ9XZZ REPAIR PERINEUM SKIN, EXTERNAL APPROACH: ICD-10-PCS | Performed by: PHYSICIAN ASSISTANT

## 2021-10-19 PROCEDURE — 65660000000 HC RM CCU STEPDOWN

## 2021-10-19 PROCEDURE — 65270000029 HC RM PRIVATE

## 2021-10-19 PROCEDURE — 2709999900 HC NON-CHARGEABLE SUPPLY

## 2021-10-19 PROCEDURE — 77030031139 HC SUT VCRL2 J&J -A: Performed by: OBSTETRICS & GYNECOLOGY

## 2021-10-19 PROCEDURE — 82550 ASSAY OF CK (CPK): CPT

## 2021-10-19 PROCEDURE — 93005 ELECTROCARDIOGRAM TRACING: CPT

## 2021-10-19 PROCEDURE — 74011250636 HC RX REV CODE- 250/636: Performed by: NURSE ANESTHETIST, CERTIFIED REGISTERED

## 2021-10-19 PROCEDURE — 2709999900 HC NON-CHARGEABLE SUPPLY: Performed by: OBSTETRICS & GYNECOLOGY

## 2021-10-19 PROCEDURE — 70450 CT HEAD/BRAIN W/O DYE: CPT

## 2021-10-19 PROCEDURE — 77030014008 HC SPNG HEMSTAT J&J -C: Performed by: OBSTETRICS & GYNECOLOGY

## 2021-10-19 PROCEDURE — 74011250636 HC RX REV CODE- 250/636: Performed by: FAMILY MEDICINE

## 2021-10-19 PROCEDURE — 99285 EMERGENCY DEPT VISIT HI MDM: CPT

## 2021-10-19 PROCEDURE — 74011250636 HC RX REV CODE- 250/636: Performed by: ANESTHESIOLOGY

## 2021-10-19 PROCEDURE — 80053 COMPREHEN METABOLIC PANEL: CPT

## 2021-10-19 PROCEDURE — 94762 N-INVAS EAR/PLS OXIMTRY CONT: CPT

## 2021-10-19 PROCEDURE — 87040 BLOOD CULTURE FOR BACTERIA: CPT

## 2021-10-19 PROCEDURE — 36415 COLL VENOUS BLD VENIPUNCTURE: CPT

## 2021-10-19 PROCEDURE — 77030003029 HC SUT VCRL J&J -B: Performed by: OBSTETRICS & GYNECOLOGY

## 2021-10-19 PROCEDURE — 77030040361 HC SLV COMPR DVT MDII -B: Performed by: OBSTETRICS & GYNECOLOGY

## 2021-10-19 PROCEDURE — 76210000016 HC OR PH I REC 1 TO 1.5 HR: Performed by: OBSTETRICS & GYNECOLOGY

## 2021-10-19 PROCEDURE — 77030026438 HC STYL ET INTUB CARD -A: Performed by: ANESTHESIOLOGY

## 2021-10-19 PROCEDURE — 76210000026 HC REC RM PH II 1 TO 1.5 HR: Performed by: OBSTETRICS & GYNECOLOGY

## 2021-10-19 PROCEDURE — 74011250637 HC RX REV CODE- 250/637: Performed by: FAMILY MEDICINE

## 2021-10-19 PROCEDURE — 77030021052 HC RNG RETRCTR STAY COOP -A: Performed by: OBSTETRICS & GYNECOLOGY

## 2021-10-19 PROCEDURE — 77030040830 HC CATH URETH FOL MDII -A: Performed by: OBSTETRICS & GYNECOLOGY

## 2021-10-19 PROCEDURE — 88305 TISSUE EXAM BY PATHOLOGIST: CPT

## 2021-10-19 PROCEDURE — 77030008684 HC TU ET CUF COVD -B: Performed by: ANESTHESIOLOGY

## 2021-10-19 PROCEDURE — 83735 ASSAY OF MAGNESIUM: CPT

## 2021-10-19 DEVICE — CONTINENCE SYSTEM
Type: IMPLANTABLE DEVICE | Site: VAGINA | Status: FUNCTIONAL
Brand: GYNECARE TVT EXACT

## 2021-10-19 RX ORDER — ROCURONIUM BROMIDE 10 MG/ML
INJECTION, SOLUTION INTRAVENOUS AS NEEDED
Status: DISCONTINUED | OUTPATIENT
Start: 2021-10-19 | End: 2021-10-19 | Stop reason: HOSPADM

## 2021-10-19 RX ORDER — ATORVASTATIN CALCIUM 10 MG/1
10 TABLET, FILM COATED ORAL
Status: DISCONTINUED | OUTPATIENT
Start: 2021-10-19 | End: 2021-10-21 | Stop reason: HOSPADM

## 2021-10-19 RX ORDER — GLYCOPYRROLATE 0.2 MG/ML
INJECTION INTRAMUSCULAR; INTRAVENOUS AS NEEDED
Status: DISCONTINUED | OUTPATIENT
Start: 2021-10-19 | End: 2021-10-19 | Stop reason: HOSPADM

## 2021-10-19 RX ORDER — SODIUM CHLORIDE 0.9 % (FLUSH) 0.9 %
5-40 SYRINGE (ML) INJECTION EVERY 8 HOURS
Status: DISCONTINUED | OUTPATIENT
Start: 2021-10-19 | End: 2021-10-21 | Stop reason: HOSPADM

## 2021-10-19 RX ORDER — SODIUM CHLORIDE 0.9 % (FLUSH) 0.9 %
5-40 SYRINGE (ML) INJECTION EVERY 8 HOURS
Status: DISCONTINUED | OUTPATIENT
Start: 2021-10-19 | End: 2021-10-19 | Stop reason: HOSPADM

## 2021-10-19 RX ORDER — DEXAMETHASONE SODIUM PHOSPHATE 4 MG/ML
INJECTION, SOLUTION INTRA-ARTICULAR; INTRALESIONAL; INTRAMUSCULAR; INTRAVENOUS; SOFT TISSUE AS NEEDED
Status: DISCONTINUED | OUTPATIENT
Start: 2021-10-19 | End: 2021-10-19 | Stop reason: HOSPADM

## 2021-10-19 RX ORDER — HYDROCODONE BITARTRATE AND ACETAMINOPHEN 5; 325 MG/1; MG/1
1 TABLET ORAL
Qty: 18 TABLET | Refills: 0 | Status: SHIPPED
Start: 2021-10-19 | End: 2021-10-21

## 2021-10-19 RX ORDER — OXYCODONE AND ACETAMINOPHEN 5; 325 MG/1; MG/1
1 TABLET ORAL AS NEEDED
Status: DISCONTINUED | OUTPATIENT
Start: 2021-10-19 | End: 2021-10-19 | Stop reason: HOSPADM

## 2021-10-19 RX ORDER — SODIUM CHLORIDE, SODIUM LACTATE, POTASSIUM CHLORIDE, CALCIUM CHLORIDE 600; 310; 30; 20 MG/100ML; MG/100ML; MG/100ML; MG/100ML
125 INJECTION, SOLUTION INTRAVENOUS CONTINUOUS
Status: DISCONTINUED | OUTPATIENT
Start: 2021-10-19 | End: 2021-10-19 | Stop reason: HOSPADM

## 2021-10-19 RX ORDER — ONDANSETRON 2 MG/ML
INJECTION INTRAMUSCULAR; INTRAVENOUS AS NEEDED
Status: DISCONTINUED | OUTPATIENT
Start: 2021-10-19 | End: 2021-10-19 | Stop reason: HOSPADM

## 2021-10-19 RX ORDER — SODIUM CHLORIDE 9 MG/ML
75 INJECTION, SOLUTION INTRAVENOUS CONTINUOUS
Status: DISCONTINUED | OUTPATIENT
Start: 2021-10-19 | End: 2021-10-21 | Stop reason: HOSPADM

## 2021-10-19 RX ORDER — ACETAMINOPHEN 325 MG/1
650 TABLET ORAL ONCE
Status: DISCONTINUED | OUTPATIENT
Start: 2021-10-19 | End: 2021-10-19 | Stop reason: HOSPADM

## 2021-10-19 RX ORDER — MIDAZOLAM HYDROCHLORIDE 1 MG/ML
1 INJECTION, SOLUTION INTRAMUSCULAR; INTRAVENOUS AS NEEDED
Status: DISCONTINUED | OUTPATIENT
Start: 2021-10-19 | End: 2021-10-19 | Stop reason: HOSPADM

## 2021-10-19 RX ORDER — FENTANYL CITRATE 50 UG/ML
INJECTION, SOLUTION INTRAMUSCULAR; INTRAVENOUS AS NEEDED
Status: DISCONTINUED | OUTPATIENT
Start: 2021-10-19 | End: 2021-10-19 | Stop reason: HOSPADM

## 2021-10-19 RX ORDER — FENTANYL CITRATE 50 UG/ML
25 INJECTION, SOLUTION INTRAMUSCULAR; INTRAVENOUS
Status: DISCONTINUED | OUTPATIENT
Start: 2021-10-19 | End: 2021-10-19 | Stop reason: HOSPADM

## 2021-10-19 RX ORDER — SODIUM CHLORIDE 0.9 % (FLUSH) 0.9 %
5-40 SYRINGE (ML) INJECTION AS NEEDED
Status: DISCONTINUED | OUTPATIENT
Start: 2021-10-19 | End: 2021-10-21 | Stop reason: HOSPADM

## 2021-10-19 RX ORDER — ACETAMINOPHEN 325 MG/1
650 TABLET ORAL
Status: DISCONTINUED | OUTPATIENT
Start: 2021-10-19 | End: 2021-10-21 | Stop reason: HOSPADM

## 2021-10-19 RX ORDER — SODIUM CHLORIDE 9 MG/ML
25 INJECTION, SOLUTION INTRAVENOUS CONTINUOUS
Status: DISCONTINUED | OUTPATIENT
Start: 2021-10-19 | End: 2021-10-19 | Stop reason: HOSPADM

## 2021-10-19 RX ORDER — PHENYLEPHRINE HCL IN 0.9% NACL 0.4MG/10ML
SYRINGE (ML) INTRAVENOUS AS NEEDED
Status: DISCONTINUED | OUTPATIENT
Start: 2021-10-19 | End: 2021-10-19 | Stop reason: HOSPADM

## 2021-10-19 RX ORDER — EPHEDRINE SULFATE/0.9% NACL/PF 50 MG/5 ML
SYRINGE (ML) INTRAVENOUS AS NEEDED
Status: DISCONTINUED | OUTPATIENT
Start: 2021-10-19 | End: 2021-10-19 | Stop reason: HOSPADM

## 2021-10-19 RX ORDER — HYDROMORPHONE HYDROCHLORIDE 1 MG/ML
0.2 INJECTION, SOLUTION INTRAMUSCULAR; INTRAVENOUS; SUBCUTANEOUS
Status: DISCONTINUED | OUTPATIENT
Start: 2021-10-19 | End: 2021-10-19 | Stop reason: HOSPADM

## 2021-10-19 RX ORDER — SUCCINYLCHOLINE CHLORIDE 20 MG/ML
INJECTION INTRAMUSCULAR; INTRAVENOUS AS NEEDED
Status: DISCONTINUED | OUTPATIENT
Start: 2021-10-19 | End: 2021-10-19 | Stop reason: HOSPADM

## 2021-10-19 RX ORDER — LIDOCAINE HYDROCHLORIDE 10 MG/ML
0.1 INJECTION, SOLUTION EPIDURAL; INFILTRATION; INTRACAUDAL; PERINEURAL AS NEEDED
Status: DISCONTINUED | OUTPATIENT
Start: 2021-10-19 | End: 2021-10-19 | Stop reason: HOSPADM

## 2021-10-19 RX ORDER — BUPIVACAINE HYDROCHLORIDE 5 MG/ML
INJECTION, SOLUTION EPIDURAL; INTRACAUDAL AS NEEDED
Status: DISCONTINUED | OUTPATIENT
Start: 2021-10-19 | End: 2021-10-19 | Stop reason: HOSPADM

## 2021-10-19 RX ORDER — IBUPROFEN 600 MG/1
600 TABLET ORAL
Qty: 50 TABLET | Refills: 0 | Status: SHIPPED | OUTPATIENT
Start: 2021-10-19 | End: 2021-11-04

## 2021-10-19 RX ORDER — SODIUM CHLORIDE 0.9 % (FLUSH) 0.9 %
5-40 SYRINGE (ML) INJECTION AS NEEDED
Status: DISCONTINUED | OUTPATIENT
Start: 2021-10-19 | End: 2021-10-19 | Stop reason: HOSPADM

## 2021-10-19 RX ORDER — FENTANYL CITRATE 50 UG/ML
50 INJECTION, SOLUTION INTRAMUSCULAR; INTRAVENOUS AS NEEDED
Status: DISCONTINUED | OUTPATIENT
Start: 2021-10-19 | End: 2021-10-19 | Stop reason: HOSPADM

## 2021-10-19 RX ORDER — SODIUM CHLORIDE 9 MG/ML
INJECTION INTRAMUSCULAR; INTRAVENOUS; SUBCUTANEOUS AS NEEDED
Status: DISCONTINUED | OUTPATIENT
Start: 2021-10-19 | End: 2021-10-19 | Stop reason: HOSPADM

## 2021-10-19 RX ORDER — NEOSTIGMINE METHYLSULFATE 1 MG/ML
INJECTION, SOLUTION INTRAVENOUS AS NEEDED
Status: DISCONTINUED | OUTPATIENT
Start: 2021-10-19 | End: 2021-10-19 | Stop reason: HOSPADM

## 2021-10-19 RX ORDER — MIDAZOLAM HYDROCHLORIDE 1 MG/ML
0.5 INJECTION, SOLUTION INTRAMUSCULAR; INTRAVENOUS
Status: DISCONTINUED | OUTPATIENT
Start: 2021-10-19 | End: 2021-10-19 | Stop reason: HOSPADM

## 2021-10-19 RX ORDER — EPINEPHRINE 1 MG/ML
INJECTION, SOLUTION, CONCENTRATE INTRAVENOUS AS NEEDED
Status: DISCONTINUED | OUTPATIENT
Start: 2021-10-19 | End: 2021-10-19 | Stop reason: HOSPADM

## 2021-10-19 RX ORDER — MORPHINE SULFATE 2 MG/ML
2 INJECTION, SOLUTION INTRAMUSCULAR; INTRAVENOUS
Status: DISCONTINUED | OUTPATIENT
Start: 2021-10-19 | End: 2021-10-19 | Stop reason: HOSPADM

## 2021-10-19 RX ORDER — SODIUM CHLORIDE 9 MG/ML
75 INJECTION, SOLUTION INTRAVENOUS CONTINUOUS
Status: DISCONTINUED | OUTPATIENT
Start: 2021-10-19 | End: 2021-10-19 | Stop reason: SDUPTHER

## 2021-10-19 RX ORDER — PANTOPRAZOLE SODIUM 20 MG/1
20 TABLET, DELAYED RELEASE ORAL
Status: DISCONTINUED | OUTPATIENT
Start: 2021-10-20 | End: 2021-10-20

## 2021-10-19 RX ORDER — PROPOFOL 10 MG/ML
INJECTION, EMULSION INTRAVENOUS AS NEEDED
Status: DISCONTINUED | OUTPATIENT
Start: 2021-10-19 | End: 2021-10-19 | Stop reason: HOSPADM

## 2021-10-19 RX ORDER — LIDOCAINE HYDROCHLORIDE 20 MG/ML
INJECTION, SOLUTION EPIDURAL; INFILTRATION; INTRACAUDAL; PERINEURAL AS NEEDED
Status: DISCONTINUED | OUTPATIENT
Start: 2021-10-19 | End: 2021-10-19 | Stop reason: HOSPADM

## 2021-10-19 RX ORDER — DIPHENHYDRAMINE HYDROCHLORIDE 50 MG/ML
12.5 INJECTION, SOLUTION INTRAMUSCULAR; INTRAVENOUS AS NEEDED
Status: DISCONTINUED | OUTPATIENT
Start: 2021-10-19 | End: 2021-10-19 | Stop reason: HOSPADM

## 2021-10-19 RX ORDER — ONDANSETRON 2 MG/ML
4 INJECTION INTRAMUSCULAR; INTRAVENOUS AS NEEDED
Status: DISCONTINUED | OUTPATIENT
Start: 2021-10-19 | End: 2021-10-19 | Stop reason: HOSPADM

## 2021-10-19 RX ORDER — ASPIRIN 81 MG/1
81 TABLET ORAL DAILY
Status: DISCONTINUED | OUTPATIENT
Start: 2021-10-20 | End: 2021-10-21 | Stop reason: HOSPADM

## 2021-10-19 RX ADMIN — ROCURONIUM BROMIDE 10 MG: 10 SOLUTION INTRAVENOUS at 10:12

## 2021-10-19 RX ADMIN — FENTANYL CITRATE 50 MCG: 50 INJECTION, SOLUTION INTRAMUSCULAR; INTRAVENOUS at 10:12

## 2021-10-19 RX ADMIN — WATER 2 G: 1 INJECTION INTRAMUSCULAR; INTRAVENOUS; SUBCUTANEOUS at 10:17

## 2021-10-19 RX ADMIN — ONDANSETRON HYDROCHLORIDE 4 MG: 2 INJECTION, SOLUTION INTRAMUSCULAR; INTRAVENOUS at 11:39

## 2021-10-19 RX ADMIN — SODIUM CHLORIDE 75 ML/HR: 9 INJECTION, SOLUTION INTRAVENOUS at 19:04

## 2021-10-19 RX ADMIN — Medication 10 ML: at 22:00

## 2021-10-19 RX ADMIN — FENTANYL CITRATE 25 MCG: 50 INJECTION, SOLUTION INTRAMUSCULAR; INTRAVENOUS at 12:02

## 2021-10-19 RX ADMIN — ACETAMINOPHEN 650 MG: 325 TABLET ORAL at 21:29

## 2021-10-19 RX ADMIN — Medication 40 MCG: at 10:13

## 2021-10-19 RX ADMIN — GLYCOPYRROLATE 0.4 MG: 0.2 INJECTION, SOLUTION INTRAMUSCULAR; INTRAVENOUS at 11:41

## 2021-10-19 RX ADMIN — LIDOCAINE HYDROCHLORIDE 80 MG: 20 INJECTION, SOLUTION EPIDURAL; INFILTRATION; INTRACAUDAL; PERINEURAL at 10:12

## 2021-10-19 RX ADMIN — ATORVASTATIN CALCIUM 10 MG: 10 TABLET, FILM COATED ORAL at 21:29

## 2021-10-19 RX ADMIN — NEOSTIGMINE METHYLSULFATE 3 MG: 1 INJECTION, SOLUTION INTRAVENOUS at 11:41

## 2021-10-19 RX ADMIN — SUCCINYLCHOLINE CHLORIDE 110 MG: 20 INJECTION, SOLUTION INTRAMUSCULAR; INTRAVENOUS at 10:12

## 2021-10-19 RX ADMIN — PROPOFOL 20 MG: 10 INJECTION, EMULSION INTRAVENOUS at 11:15

## 2021-10-19 RX ADMIN — SODIUM CHLORIDE, POTASSIUM CHLORIDE, SODIUM LACTATE AND CALCIUM CHLORIDE 125 ML/HR: 600; 310; 30; 20 INJECTION, SOLUTION INTRAVENOUS at 09:48

## 2021-10-19 RX ADMIN — PROPOFOL 75 MG: 10 INJECTION, EMULSION INTRAVENOUS at 10:12

## 2021-10-19 RX ADMIN — Medication 5 MG: at 11:07

## 2021-10-19 RX ADMIN — Medication 10 MG: at 10:42

## 2021-10-19 RX ADMIN — SODIUM CHLORIDE 75 ML/HR: 9 INJECTION, SOLUTION INTRAVENOUS at 20:55

## 2021-10-19 RX ADMIN — ROCURONIUM BROMIDE 10 MG: 10 SOLUTION INTRAVENOUS at 10:30

## 2021-10-19 RX ADMIN — FENTANYL CITRATE 25 MCG: 50 INJECTION INTRAMUSCULAR; INTRAVENOUS at 12:33

## 2021-10-19 RX ADMIN — DEXAMETHASONE SODIUM PHOSPHATE 4 MG: 4 INJECTION, SOLUTION INTRAMUSCULAR; INTRAVENOUS at 10:29

## 2021-10-19 RX ADMIN — PROPOFOL 25 MG: 10 INJECTION, EMULSION INTRAVENOUS at 10:13

## 2021-10-19 RX ADMIN — OXYCODONE AND ACETAMINOPHEN 1 TABLET: 5; 325 TABLET ORAL at 13:12

## 2021-10-19 RX ADMIN — Medication 40 MCG: at 10:12

## 2021-10-19 RX ADMIN — FENTANYL CITRATE 25 MCG: 50 INJECTION, SOLUTION INTRAMUSCULAR; INTRAVENOUS at 12:00

## 2021-10-19 RX ADMIN — PHENYLEPHRINE HYDROCHLORIDE 20 MCG/MIN: 10 INJECTION INTRAVENOUS at 10:16

## 2021-10-19 NOTE — PERIOP NOTES
60ML OF 0.25% BUPIVICAINE WITH 0.3ML OF EPINEPHRINE (1MG/ML) WAS GIVEN TO THE STERILE FIELD TO BE USED DURING SURGERY.

## 2021-10-19 NOTE — DISCHARGE INSTRUCTIONS
Post-Operative Care Instructions  following pelvic reconstruction surgery    [facility]  [phone]    For the next four weeks, these instructions should be followed as carefully as possible. Please contact my office if you have any questions and ask for my nurse. Please review and follow these instructions: In the first couple of days, take it easy but remain mildly active. Be aware that you will experience some pain and discomfort in the vagina and anal area. This is normal and will lessen within a few days. Week 1: For the first week at home you should perform NO household activities such as laundry, vacuuming, shopping, or gardening. DO NOT lift anything heavier than a one gallon milk jug. If possible avoid climbing stairs more than once per day. DO NOT use a tampon or insert anything in the vagina unless you were prescribed a vaginal estrogen cream. NO intercourse for at least six weeks. Take a recommended laxative to reduce the risk of straining with bowel movements. Please note that a normal bowel movement will not damage your repair. You may experience some vaginal bleeding. A small amount is normal. If your flow is heavier than a period, please call my office. Discharge may occur until the sutures dissolve in 4-6 weeks. Weeks 2: Gradually increase your physical activity, but specifically you should still not lift anything heavier than the milk jug. Walking is good for you-just no aerobics, jogging or impact exercises. You may begin to drive at this time if you feel your comfort and reaction time have returned. Make sure you have an appointment with me 4 weeks after your surgery. Weeks 4: Gradually resume normal activities. Sexual activity can be resumed after six weeks if you were cleared at your post-operative appointment. You may take a shower after 48 hours have passed. Do not clean the vagina with any soaps. Water is sufficient. Please avoid sitting in bath water until Dr. Latoya Petersen gives you the ok. Please call the office or seek immediate medical attention if you have vaginal bleeding greater than 1 pad an hour, malodorous vaginal discharge that looks like pus, chest pain or shortness of breath, uncontrollable nausea and vomiting, a fever greater than 100.5 degrees, or uncontrollable pain. Dont hesitate to call my office with any questions or concerns and ask for my nurse. A physician is on call 24 hours a day, seven days a week for any urgent issues. After business hours and on weekends and holidays, call [phone] and ask to have the doctor on call paged. ______________________________________________________________________    Anesthesia Discharge Instructions    After general anesthesia or intervenous sedation, for 24 hours or while taking prescription Narcotics:  · Limit your activities  · Do not drive or operate hazardous machinery  · If you have not urinated within 8 hours after discharge, please contact your surgeon on call. · Do not make important personal or business decisions  · Do not drink alcoholic beverages    Report the following to your surgeon:  · Excessive pain, swelling, redness or odor of or around the surgical area  · Temperature over 100.5 degrees  · Nausea and vomiting lasting longer than 4 hours or if unable to take medication  · Any signs of decreased circulation or nerve impairment to extremity:  Change in color, persistent numbness, tingling, coldness or increased pain.   · Any questions

## 2021-10-19 NOTE — ED TRIAGE NOTES
Pt coming in for a witnessed syncope episode at home. According to ems the pt had a procedure today for a uterine prolapse and was sent home today at 2:15. Pt walked up a few steps and became light headed and syncoped, ems states that the pt did not hit her head. Ems arrived and stated that the pt was pale, confused, bradycardic. Pt does not remember the episode.

## 2021-10-19 NOTE — PERIOP NOTES
During void trial, patient missed the hat in the toilet. Some output was unmeasurable. 100 mL in hat. Patient was instructed to notify provider if she has not voided within 8 hours.

## 2021-10-19 NOTE — ED PROVIDER NOTES
HPI       81y F here with syncope. Had an outpatient surgery today (bladder tacked up). Did well and went home. Once getting home felt weak and dizzy. EMS was called. When they arrived she had 2 witnessed syncopal events. Took a little while to get her from upstairs to downstairs, and when they did get back to the ambulance, her HR was still in the 40's so given 0.5mg atropine with improvement in HR and more awake. States she was in her usual state of health prior to this. No vomiting. No diarrhea. No rash. No complaints at this time. Past Medical History:   Diagnosis Date    Adverse effect of anesthesia 2005    took a long time to go to sleep with last colonoscopy    CAD (coronary artery disease) 2010    MI?  Chronic pain     hip rt    Fuchs' corneal dystrophy     Gastrointestinal disorder     GERD    GERD (gastroesophageal reflux disease)     Glaucoma     High cholesterol     Hypertension     Ill-defined condition     osteopenia    Ill-defined condition     pulled shoulder lt.  and elbow replacement left    Peripheral neuropathy     BOTH FEET    Shingles 2018       Past Surgical History:   Procedure Laterality Date    COLONOSCOPY N/A 9/9/2016    COL[facility]  9/9/2016         HX CATARACT REMOVAL Bilateral     HX COLONOSCOPY      HX ORTHOPAEDIC      HERNIATED DISC 2017    HX ORTHOPAEDIC           Family History:   Problem Relation Age of Onset    Hypertension Mother     Atrial Fibrillation Mother     Cancer Father     Atrial Fibrillation Sister     Other Brother         detached retina    Anesth Problems Neg Hx        Social History     Socioeconomic History    Marital status:      Spouse name: Not on file    Number of children: Not on file    Years of education: Not on file    Highest education level: Not on file   Occupational History    Not on file   Tobacco Use    Smoking status: Former Smoker Packs/day: 0.50     Years: 2.00     Pack years: 1.00     Quit date: 1959     Years since quittin.8    Smokeless tobacco: Never Used   Vaping Use    Vaping Use: Never used   Substance and Sexual Activity    Alcohol use: Yes     Comment: rare    Drug use: No    Sexual activity: Not Currently   Other Topics Concern    Not on file   Social History Narrative    Not on file     Social Determinants of Health     Financial Resource Strain:     Difficulty of Paying Living Expenses:    Food Insecurity:     Worried About Running Out of Food in the Last Year:     [address] in the Last Year:    Transportation Needs:     Lack of Transportation (Medical):  Lack of Transportation (Non-Medical):    Physical Activity:     Days of Exercise per Week:     Minutes of Exercise per Session:    Stress:     Feeling of Stress :    Social Connections:     Frequency of Communication with Friends and Family:     Frequency of Social Gatherings with Friends and Family:     Attends [employer] Services:     Active Member of Clubs or Organizations:     Attends Club or Organization Meetings:     Marital Status:    Intimate Partner Violence:     Fear of Current or Ex-Partner:     Emotionally Abused:     Physically Abused:     Sexually Abused: ALLERGIES: Alendronate and Sulfamethoxazole-trimethoprim    Review of Systems   Review of Systems   Constitutional: (-) weight loss. HEENT: (-) stiff neck   Eyes: (-) discharge. Respiratory: (-) cough. Cardiovascular: (-) syncope. Gastrointestinal: (-) blood in stool. Genitourinary: (-) hematuria. Musculoskeletal: (-) myalgias. Neurological: (-) seizure. Skin: (-) petechiae  Lymph/Immunologic: (-) enlarged lymph nodes  All other systems reviewed and are negative. There were no vitals filed for this visit. Physical Exam Nursing note and vitals reviewed. Constitutional: oriented to person, place, and time. appears elderly and frail. No distress. Head: Normocephalic and atraumatic. Sclera anicteric  Nose: No rhinorrhea  Mouth/Throat: Oropharynx is clear and moist. Pharynx normal  Eyes: Conjunctivae are normal. Pupils are equal, round, and reactive to light. Right eye exhibits no discharge. Left eye exhibits no discharge. Neck: Painless normal range of motion. Neck supple. No LAD. Cardiovascular: Normal rate, regular rhythm, normal heart sounds and intact distal pulses. Exam reveals no gallop and no friction rub. No murmur heard. Pulmonary/Chest:  No respiratory distress. No wheezes. No rales. No rhonchi. No increased work of breathing. No accessory muscle use. Good air exchange throughout. Abdominal: soft, non-tender, no rebound or guarding. No hepatosplenomegaly. Normal bowel sounds throughout. Back: no tenderness to palpation, no deformities, no CVA tenderness  Extremities/Musculoskeletal: Normal range of motion. no tenderness. No edema. Distal extremities are neurovasc intact. Lymphadenopathy:   No adenopathy. Neurological:  Alert and oriented to person, place, and time. Coordination normal. CN 2-12 intact. Motor and sensory function intact. Skin: Skin is warm and dry. No rash noted. No pallor. MDM 81y F here with syncope x 2. Will check labs and ECG. Likely admit. Procedures      ED EKG interpretation:  Rhythm: normal sinus rhythm; and regular . Rate (approx.): 83; Axis: normal; P wave: normal; QRS interval: normal ; ST/T wave: normal;  This EKG was interpreted by Daniel Mireles MD,ED Provider. Perfect Serve Consult for Admission  5:26 PM    ED Room Number: ER10/10  Patient Name and age:  Kristen Goldberg 81 y.o.  female  Working Diagnosis:   1.  Syncope and collapse        COVID-19 Suspicion:  no  Sepsis present:  no  Reassessment needed: N/A  Code Status:  Full Code  Readmission: no  Isolation Requirements:  no  Recommended Level of Care:  telemetry  Department:[facility] Adult ED - 21   Other:  85y F here with syncope x 2. Had an outpatient surgery today (bladder tack) and did well. Went home and they had syncope x 2. Per EMS after syncope was bradycardic in the 40's and got 0.5 atropine with improvement.

## 2021-10-19 NOTE — ROUTINE PROCESS
Patient: Ángela Gerber MRN: [id number]  SSN: [id number]   YOB: 1935  Age: 81 y.o. Sex: female     Patient is status post Procedure(s):  DILATATION AND CURETTAGE/LE FORT COLPOCLEISIS/POSTERIOR COLPORRHAPHY/ CYSTOSCOPY/TVT EXACT MIDURETHRAL SLING  . ..    Surgeon(s) and Role:     Kelsie Bingham MD - Primary    Local/Dose/Irrigation:  78GN                  Peripheral IV 10/19/21 Right Antecubital (Active)   Site Assessment Clean, dry, & intact 10/19/21 0951   Phlebitis Assessment 0 10/19/21 0951   Infiltration Assessment 0 10/19/21 0951   Dressing Status Clean, dry, & intact 10/19/21 0951   Dressing Type Tape;Transparent 10/19/21 0951   Hub Color/Line Status Pink; Infusing 10/19/21 0951            Airway - Endotracheal Tube 10/19/21 Oral (Active)                   Dressing/Packing:  Incision 10/19/21 Vagina-Dressing/Treatment: Peripad;Skin glue (10/19/21 9456)    Splint/Cast:  ]    Other:

## 2021-10-19 NOTE — BRIEF OP NOTE
Brief Postoperative Note    Patient: Karla Pulido  YOB: 1935  MRN: [id number]    Date of Procedure: 10/19/2021     Pre-Op Diagnosis: PROLAPSE/STRESS INCONTINENCE    Post-Op Diagnosis: Same as preoperative diagnosis. Procedure(s):  DILATATION AND CURETTAGE/LE FORT COLPOCLEISIS/POSTERIOR COLPORRHAPHY/ CYSTOSCOPY/TVT EXACT MIDURETHRAL SLING  . Alexandre Shrestha Surgeon(s):  Brisa Santacruz MD    Surgical Assistant: Surg Asst-1: Kwadwo Mcintyre    Anesthesia: General     Estimated Blood Loss (mL): less than 630     Complications: None    Specimens:   ID Type Source Tests Collected by Time Destination   1 : ENDOMETRIAL CURETTNIGS Fresh Endometrial Curetting  Brisa Santacruz MD 10/19/2021 1031 Pathology        Implants:   Implant Name Type Inv. Item Serial No.  Lot No. LRB No. Used Action   SLING INCONT RETROPUBIC CONTINENCE SYS GYNECARE TVT EXACT - SN/A  SLING INCONT RETROPUBIC CONTINENCE SYS GYNECARE TVT EXACT N/A [facility] [id number] N/A 1 Implanted       Drains: * No LDAs found *    Findings: Stage 3-4 uterine prolapse, normal post procedure cystoscopy with B UO efflux and normal rectal exam. Uterus sounded to 7 cm, minimal curetting noted.     Electronically Signed by Richie Menard MD on 10/19/2021 at 11:54 AM

## 2021-10-19 NOTE — OP NOTES
Date: 10/19/21  Patient: Radha Parkinson  Surgery Performed: Dilation & Curettage, Lefort Colpocleisis, TVT Exact midurethral sling, posterior repair, perineorrhaphy, cystoscopy  Pre-operative diagnosis: prolapse and stress incontinence  Post-operative diagnosis: same  Surgeon: Pepper Marie MD  Surgical Assistants: SA x 2  Anesthesia: GEN  Findings: Stage 3-4 uterine prolapse, normal appearing bladder on cystoscopy with bilaterally effluxing ureters, no foreign body in bladder  Specimens removed: endometrial currettings  Prosthetic devices: TVT Exact midurethral sing. Drains: Cordero catheter  Indications: Patient with symptomatic pelvic organ prolapse underwent the above procedures understanding the alternatives, purpose, risks, benefits, complications, and jose l-operative course. Technique:  Patient was taken to the OR and placed on the OR table in supine position. She was given a general anesthetic. She was then repositioned in supportive candy cane stir-ups with care taken to safety positioning all 4 extremities. She was prepped and draped in the usual sterile fashion and a Cordero catheter was inserted, which drained clear urine for the remainder of the case. The cervix was held with a single tooth tenaculum. The cervical os was gently dilated to allow passage of a small curette after sounding to 7 cm. Minimal tissue was obtained from the endometrial cavity. Hemostasis was assured. I then began the colpocleisis. Approximately 20cc of 0.25% Marcaine with epinephrine was injected under the vaginal epithelium for hemostasis and hydrodissection after making my markings and leaving tissue under the urethra for the sling. Then, a large rectangular piece of vaginal skin was sharply dissected from the anterior and posterior vagina. Vicryl sutures were used to reapproximate the vaginal epithelium laterally and at the cervix to create a tunnel for any future unanticipated uterine drainage.  Then, a series of imbricating vicryl sutures were placed through the anterior and posterior endopelvic fascia to reduce the denuded vagina using PDS and vicryl sutures. This was an extensive repair given the size of her prolapse. Cystoscopy for lower urinary tract safety was performed. No suture or foreign body was noted in the bladder and the urothelium appeared normal.  Brisk, bilateral efflux was seen from both ureters. I then proceeded with the sling. The 17 Kiswahili cystoscope was used to divert the urethra and bladder contra laterally to the side of the needle passage. The trocar for the Gynecare TVT exact was placed through the right-sided tunnel through the endopelvic fascia and up through the abdomen, just above the pubic symphysis through the skin marking already made. This procedure was repeated on the patient's left. I used sterile saline to hydrodissect the retrupubic space and 25% maracaine with epi suburethrally. Cystoscopy was then performed and showed no perforation of the bladder as well as bilateral ureteral jet. Urethroscopy was then performed to show no damage to the urethra. The Cordero catheter was replaced. Tape was brought through the level of the skin. The tape was carefully adjusted to lie in a tension-free manner. The plastic sleeves were removed from the mesh; the mesh was trimmed and the vaginal mucosa was closed with 2-0 vicryl suture. The suprapubic skin incisions were closed with dermabond. The Cordero catheter was then replaced. Next, I performed a posterior repair and then perineorrhaphy. A large triangular shaped piece of perineal skin was excised over the distal posterior wall and isolated the rectocele and then plicated this in the midline with a series of 2-0 PDS sutures. Then perineorrhaphy sutures were placed to greatly reduce the size of the genital hiatus using further 2-0 PDS sutures. 2-0 vicryl was used to close these incisions.  Rectal exam was normal.   At the termination of the case, all counts were correct. The patient was then awoken from anesthesia and taken to the recovery room in stable condition.

## 2021-10-19 NOTE — ANESTHESIA PREPROCEDURE EVALUATION
Anesthetic History   No history of anesthetic complications            Review of Systems / Medical History  Patient summary reviewed, nursing notes reviewed and pertinent labs reviewed    Pulmonary  Within defined limits                 Neuro/Psych         TIA     Cardiovascular    Hypertension: well controlled          CAD    Exercise tolerance: >4 METS     GI/Hepatic/Renal     GERD: well controlled           Endo/Other  Within defined limits           Other Findings              Physical Exam    Airway  Mallampati: II  TM Distance: > 6 cm  Neck ROM: normal range of motion   Mouth opening: Normal     Cardiovascular  Regular rate and rhythm,  S1 and S2 normal,  no murmur, click, rub, or gallop             Dental  No notable dental hx       Pulmonary  Breath sounds clear to auscultation               Abdominal  GI exam deferred       Other Findings            Anesthetic Plan    ASA: 3  Anesthesia type: general          Induction: Intravenous  Anesthetic plan and risks discussed with: Patient

## 2021-10-19 NOTE — ANESTHESIA POSTPROCEDURE EVALUATION
Procedure(s):  DILATATION AND CURETTAGE/LE FORT COLPOCLEISIS/POSTERIOR COLPORRHAPHY/ CYSTOSCOPY/TVT EXACT MIDURETHRAL SLING  . ..    general    Anesthesia Post Evaluation        Patient participation: complete - patient participated  Level of consciousness: awake  Pain management: adequate  Airway patency: patent  Anesthetic complications: no  Cardiovascular status: hemodynamically stable  Respiratory status: acceptable  Hydration status: acceptable  Comments: The patient is ready for PACU discharge. Yulia Craven,                    Post anesthesia nausea and vomiting:  controlled      INITIAL Post-op Vital signs:   Vitals Value Taken Time   /70 10/19/21 1220   Temp 36.2 °C (97.2 °F) 10/19/21 1206   Pulse 74 10/19/21 1225   Resp 22 10/19/21 1225   SpO2 98 % 10/19/21 1224   Vitals shown include unvalidated device data.

## 2021-10-20 ENCOUNTER — APPOINTMENT (OUTPATIENT)
Dept: NON INVASIVE DIAGNOSTICS | Age: 86
DRG: 983 | End: 2021-10-20
Attending: FAMILY MEDICINE
Payer: MEDICARE

## 2021-10-20 ENCOUNTER — APPOINTMENT (OUTPATIENT)
Dept: CT IMAGING | Age: 86
DRG: 983 | End: 2021-10-20
Attending: NURSE PRACTITIONER
Payer: MEDICARE

## 2021-10-20 LAB
ANION GAP SERPL CALC-SCNC: 2 MMOL/L (ref 5–15)
APPEARANCE UR: CLEAR
BACTERIA URNS QL MICRO: NEGATIVE /HPF
BASOPHILS # BLD: 0.1 K/UL (ref 0–0.1)
BASOPHILS NFR BLD: 0 % (ref 0–1)
BILIRUB UR QL: NEGATIVE
BUN SERPL-MCNC: 22 MG/DL (ref 6–20)
BUN/CREAT SERPL: 35 (ref 12–20)
CALCIUM SERPL-MCNC: 8.4 MG/DL (ref 8.5–10.1)
CHLORIDE SERPL-SCNC: 113 MMOL/L (ref 97–108)
CK SERPL-CCNC: 83 U/L (ref 26–192)
CO2 SERPL-SCNC: 24 MMOL/L (ref 21–32)
COLOR UR: ABNORMAL
COMMENT, HOLDF: NORMAL
CREAT SERPL-MCNC: 0.62 MG/DL (ref 0.55–1.02)
DIFFERENTIAL METHOD BLD: ABNORMAL
ECHO AO ROOT DIAM: 3.28 CM
ECHO AV AREA PEAK VELOCITY: 2.2 CM2
ECHO AV AREA/BSA PEAK VELOCITY: 1.2 CM2/M2
ECHO AV PEAK GRADIENT: 8.78 MMHG
ECHO AV PEAK VELOCITY: 148.16 CM/S
ECHO LA AREA 4C: 21.74 CM2
ECHO LA VOL 2C: 58.64 ML (ref 22–52)
ECHO LA VOL 4C: 60.79 ML (ref 22–52)
ECHO LA VOL BP: 63.45 ML (ref 22–52)
ECHO LA VOL/BSA BIPLANE: 34.67 ML/M2 (ref 16–28)
ECHO LA VOLUME INDEX A2C: 32.04 ML/M2 (ref 16–28)
ECHO LA VOLUME INDEX A4C: 33.22 ML/M2 (ref 16–28)
ECHO LV INTERNAL DIMENSION DIASTOLIC: 5.05 CM (ref 3.9–5.3)
ECHO LV INTERNAL DIMENSION SYSTOLIC: 3.7 CM
ECHO LV IVSD: 1.12 CM (ref 0.6–0.9)
ECHO LV MASS 2D: 260.1 G (ref 67–162)
ECHO LV MASS INDEX 2D: 142.1 G/M2 (ref 43–95)
ECHO LV POSTERIOR WALL DIASTOLIC: 1.44 CM (ref 0.6–0.9)
ECHO LVOT DIAM: 2.06 CM
ECHO LVOT PEAK GRADIENT: 3.82 MMHG
ECHO LVOT PEAK VELOCITY: 97.78 CM/S
ECHO MV A VELOCITY: 114.48 CM/S
ECHO MV AREA PHT: 1.93 CM2
ECHO MV E DECELERATION TIME (DT): 393.8 MS
ECHO MV E VELOCITY: 65.77 CM/S
ECHO MV E/A RATIO: 0.57
ECHO MV PRESSURE HALF TIME (PHT): 114.2 MS
ECHO PV PEAK INSTANTANEOUS GRADIENT SYSTOLIC: 4.8 MMHG
ECHO RV TAPSE: 2.3 CM (ref 1.5–2)
EOSINOPHIL # BLD: 0 K/UL (ref 0–0.4)
EOSINOPHIL NFR BLD: 0 % (ref 0–7)
EPITH CASTS URNS QL MICRO: ABNORMAL /LPF
ERYTHROCYTE [DISTWIDTH] IN BLOOD BY AUTOMATED COUNT: 13.3 % (ref 11.5–14.5)
GLUCOSE SERPL-MCNC: 126 MG/DL (ref 65–100)
GLUCOSE UR STRIP.AUTO-MCNC: NEGATIVE MG/DL
HCT VFR BLD AUTO: 34.4 % (ref 35–47)
HGB BLD-MCNC: 11.1 G/DL (ref 11.5–16)
HGB UR QL STRIP: ABNORMAL
IMM GRANULOCYTES # BLD AUTO: 0.1 K/UL (ref 0–0.04)
IMM GRANULOCYTES NFR BLD AUTO: 0 % (ref 0–0.5)
KETONES UR QL STRIP.AUTO: 15 MG/DL
LEUKOCYTE ESTERASE UR QL STRIP.AUTO: NEGATIVE
LYMPHOCYTES # BLD: 1.5 K/UL (ref 0.8–3.5)
LYMPHOCYTES NFR BLD: 10 % (ref 12–49)
MCH RBC QN AUTO: 30.7 PG (ref 26–34)
MCHC RBC AUTO-ENTMCNC: 32.3 G/DL (ref 30–36.5)
MCV RBC AUTO: 95.3 FL (ref 80–99)
MONOCYTES # BLD: 0.8 K/UL (ref 0–1)
MONOCYTES NFR BLD: 6 % (ref 5–13)
MUCOUS THREADS URNS QL MICRO: ABNORMAL /LPF
NEUTS SEG # BLD: 12.2 K/UL (ref 1.8–8)
NEUTS SEG NFR BLD: 84 % (ref 32–75)
NITRITE UR QL STRIP.AUTO: NEGATIVE
NRBC # BLD: 0 K/UL (ref 0–0.01)
NRBC BLD-RTO: 0 PER 100 WBC
PH UR STRIP: 5 [PH] (ref 5–8)
PLATELET # BLD AUTO: 161 K/UL (ref 150–400)
PMV BLD AUTO: 10.6 FL (ref 8.9–12.9)
POTASSIUM SERPL-SCNC: 4.4 MMOL/L (ref 3.5–5.1)
PROT UR STRIP-MCNC: NEGATIVE MG/DL
RBC # BLD AUTO: 3.61 M/UL (ref 3.8–5.2)
RBC #/AREA URNS HPF: ABNORMAL /HPF (ref 0–5)
SAMPLES BEING HELD,HOLD: NORMAL
SODIUM SERPL-SCNC: 139 MMOL/L (ref 136–145)
SP GR UR REFRACTOMETRY: 1.01 (ref 1–1.03)
TROPONIN-HIGH SENSITIVITY: 5 NG/L (ref 0–51)
TROPONIN-HIGH SENSITIVITY: 5 NG/L (ref 0–51)
TSH SERPL DL<=0.05 MIU/L-ACNC: 0.35 UIU/ML (ref 0.36–3.74)
UR CULT HOLD, URHOLD: NORMAL
UROBILINOGEN UR QL STRIP.AUTO: 0.2 EU/DL (ref 0.2–1)
WBC # BLD AUTO: 14.6 K/UL (ref 3.6–11)
WBC URNS QL MICRO: ABNORMAL /HPF (ref 0–4)

## 2021-10-20 PROCEDURE — 93306 TTE W/DOPPLER COMPLETE: CPT | Performed by: SPECIALIST

## 2021-10-20 PROCEDURE — 74011250636 HC RX REV CODE- 250/636: Performed by: FAMILY MEDICINE

## 2021-10-20 PROCEDURE — 99223 1ST HOSP IP/OBS HIGH 75: CPT | Performed by: SPECIALIST

## 2021-10-20 PROCEDURE — 74011250637 HC RX REV CODE- 250/637: Performed by: HOSPITALIST

## 2021-10-20 PROCEDURE — 71275 CT ANGIOGRAPHY CHEST: CPT

## 2021-10-20 PROCEDURE — 74011250637 HC RX REV CODE- 250/637: Performed by: FAMILY MEDICINE

## 2021-10-20 PROCEDURE — 65660000000 HC RM CCU STEPDOWN

## 2021-10-20 PROCEDURE — 36415 COLL VENOUS BLD VENIPUNCTURE: CPT

## 2021-10-20 PROCEDURE — 97530 THERAPEUTIC ACTIVITIES: CPT

## 2021-10-20 PROCEDURE — 84443 ASSAY THYROID STIM HORMONE: CPT

## 2021-10-20 PROCEDURE — 74011000636 HC RX REV CODE- 636: Performed by: RADIOLOGY

## 2021-10-20 PROCEDURE — 80048 BASIC METABOLIC PNL TOTAL CA: CPT

## 2021-10-20 PROCEDURE — 93306 TTE W/DOPPLER COMPLETE: CPT

## 2021-10-20 PROCEDURE — 85025 COMPLETE CBC W/AUTO DIFF WBC: CPT

## 2021-10-20 PROCEDURE — 81001 URINALYSIS AUTO W/SCOPE: CPT

## 2021-10-20 PROCEDURE — 84484 ASSAY OF TROPONIN QUANT: CPT

## 2021-10-20 PROCEDURE — 97165 OT EVAL LOW COMPLEX 30 MIN: CPT

## 2021-10-20 PROCEDURE — 82550 ASSAY OF CK (CPK): CPT

## 2021-10-20 RX ORDER — ETOMIDATE 2 MG/ML
INJECTION INTRAVENOUS
Status: DISPENSED
Start: 2021-10-20 | End: 2021-10-21

## 2021-10-20 RX ORDER — PROPOFOL 10 MG/ML
INJECTION, EMULSION INTRAVENOUS
Status: DISPENSED
Start: 2021-10-20 | End: 2021-10-21

## 2021-10-20 RX ORDER — PANTOPRAZOLE SODIUM 20 MG/1
20 TABLET, DELAYED RELEASE ORAL
Status: DISCONTINUED | OUTPATIENT
Start: 2021-10-20 | End: 2021-10-21 | Stop reason: HOSPADM

## 2021-10-20 RX ADMIN — ATORVASTATIN CALCIUM 10 MG: 10 TABLET, FILM COATED ORAL at 22:00

## 2021-10-20 RX ADMIN — ACETAMINOPHEN 650 MG: 325 TABLET ORAL at 13:21

## 2021-10-20 RX ADMIN — PANTOPRAZOLE SODIUM 20 MG: 20 TABLET, DELAYED RELEASE ORAL at 20:19

## 2021-10-20 RX ADMIN — IOPAMIDOL 80 ML: 755 INJECTION, SOLUTION INTRAVENOUS at 00:13

## 2021-10-20 RX ADMIN — Medication 10 ML: at 20:17

## 2021-10-20 RX ADMIN — SODIUM CHLORIDE 75 ML/HR: 9 INJECTION, SOLUTION INTRAVENOUS at 20:11

## 2021-10-20 RX ADMIN — ASPIRIN 81 MG: 81 TABLET, COATED ORAL at 13:21

## 2021-10-20 NOTE — PROGRESS NOTES
Care Management:    Transition of Care Plan:     · RUR: 9%  · Disposition: home (Sister will be staying with her until 10-30-21)  · Transportation: son    Met with patient in her ED room. She confirmed her address. CM updated her phone number. Patient lives alone in a 3 level home. Her bedrooms are on top level. He living area on the middle floor. She said she typically does the stairs multiple times a day. Her 10 year younger sister-Sandra Rodgers is here from [place] to stay with her after her surgery yesterday. Her sister will be here until 10-30-21. Patient's son will transport her home. DME: cane  ADLs: independent  [facility]  Previous HH/SNF/rehab: none  Insurance: Medicare, [employer]    Patient said she worked with therapy. They recommended that she use a walker. Discussed how to obtain a walker. Explained we could send to a [facility] or they can purchase one at [place] [address] for $35. CM confirmed they do have them in stock. Her son will transport her home. Patient said the MDs told her if the Echo results were okay, she could be discharged. Per chart review, Echo results are in chart. CM spoke with hospitalist. Patient's blood pressure is still running low, she will stay another night. Will update patient. Medicare pt has received, reviewed, and signed 1st IM letter informing them of their right to appeal the discharge. Signed copy has been placed on pt bedside chart.       Reason for Admission:  R/o CVA, suspect vaso vagal response to post op surgery (bladder tuck)                   RUR Score:     9%                Plan for utilizing home health:          PCP: First and Last name:  Keagan Nance MD     Name of Practice: [facility]   Are you a current patient: Yes/No: yes   Approximate date of last visit: 10-13-21   Can you participate in a virtual visit with your PCP: unknown                    Current Advanced Directive/Advance Care Plan: Full Code    Healthcare Decision Maker:              Primary Decision Maker: Obdulia Morrell - Child - [phone]                  Transition of Care Plan:                      Care Management Interventions  PCP Verified by CM: Yes (Dr. Rishi Mcgraw)  Last Visit to PCP: 10/13/21  Palliative Care Criteria Met (RRAT>21 & CHF Dx)?: No  Mode of Transport at Discharge: Other (see comment) (son Marino Kumari )  Transition of Care Consult (CM Consult):  Other  Discharge Durable Medical Equipment: No  Physical Therapy Consult: Yes  Occupational Therapy Consult: Yes  Speech Therapy Consult: No  Support Systems: Child(maury) (son lives nearby)  [facility] Provided?: No  Discharge Location  Discharge Placement: [facility]

## 2021-10-20 NOTE — ED NOTES
Dr. Shell Nathan, the pts surgeon from this morning, called the ER for an update on the pt. The  Is concerned that the pt still has not urinated since before the surgery. Verbal orders taken that if the pt still cannot urinate either via pyrwic or bedside commode by 2230 then a bladder scan will need to be done. If the pt is retaining urine then place14/16 [facility]ony Cordero Catheter.

## 2021-10-20 NOTE — ED NOTES
Bedside shift change report given to Yanick Carballo (oncoming nurse) by Deyanira Baker (offgoing nurse). Report included the following information SBAR, Kardex, ED Summary and Recent Results.

## 2021-10-20 NOTE — ED NOTES
TRANSFER - OUT REPORT:    Verbal report given to [address] RN(name) on Jeremías Ou  being transferred to 6S for routine progression of care       Report consisted of patients Situation, Background, Assessment and   Recommendations(SBAR). Information from the following report(s) SBAR was reviewed with the receiving nurse. Lines:   Peripheral IV 10/19/21 Left Wrist (Active)       Peripheral IV 10/19/21 Right (Active)   Phlebitis Assessment 0 10/20/21 0004   Infiltration Assessment 0 10/20/21 0004   Dressing Status Clean, dry, & intact 10/20/21 0004        Opportunity for questions and clarification was provided.       Patient transported with:   Monitor

## 2021-10-20 NOTE — PROGRESS NOTES
Problem: Self Care Deficits Care Plan (Adult)  Goal: *Acute Goals and Plan of Care (Insert Text)  Description:   FUNCTIONAL STATUS PRIOR TO ADMISSION: Patient was modified independent using a single point cane for functional mobility. Pt did not require assist for ADL/IADL tasks. HOME SUPPORT: The patient lived alone with family living locally, however did not require assistance prior to admission. Occupational Therapy Goals  Initiated 10/20/2021  1. Patient will perform grooming standing at sink with independence within 7 day(s). 2.  Patient will perform bathing with independence within 7 day(s). 3.  Patient will perform lower body dressing with independence within 7 day(s). 4.  Patient will perform toilet transfers with independence within 7 day(s). 5.  Patient will demonstrate knowledge of at least 2 home safety/fall prevention techniques without verbal cues within 7 day(s). 6.  Patient will utilize energy conservation techniques during functional activities with verbal cues within 7 day(s). Outcome: Not Met   OCCUPATIONAL THERAPY EVALUATION  Patient: Re Santos (12 y.o. female)  Date: 10/20/2021  Primary Diagnosis: CVA (cerebral vascular accident) [facility]) [I63.9]        Precautions: fall       ASSESSMENT  Based on the objective data described below, the patient presents below her functional baseline post admission to [facility] ED postop syncope and bradycardia (per H&P report). She is primarily limited in her ability to independently perform ADL/IADL tasks by decreased standing balance, fatigue and generalized weakness, and impaired safety awareness/need for assistance. Pt's BP remained stable throughout session, noted in vitals chart below. Performed supine>sit, sit>stand with CGA, however demonstrated unsteady gait and required up to min A with ambulation.  Recommend use of RW for safety and stability during mobility and functional activity (please also defer to PT's recommendation upon evaluation). Recommend SNF at d/c, if SNF not an option recommend [address] with supervision in the home for all OOB mobility and ADL/IADL task performance. Vitals:    10/20/21 1501 10/20/21 1503 10/20/21 1509   BP: 132/67 122/74 (!) 140/66   BP 1 Location: Left upper arm Left upper arm Left upper arm   BP Patient Position: At rest;Supine Sitting Standing  Comment: after ambulating ~60 feet   Pulse: 86 98 (!) 104     Current Level of Function Impacting Discharge (ADLs/self-care): Infer independent to stand-by assistance for all ADLs. Supine>sit CGA, sit>stand CGA, mobility up to min A for safety/stability with gait belt     Functional Outcome Measure: The patient scored Total: 60/100 on the Barthel Index outcome measure which is indicative of being minimally independent in basic self-care. Other factors to consider for discharge: PLOF modified independent with SPC for community mobility. Patient will benefit from skilled therapy intervention to address the above noted impairments. PLAN :  Recommendations and Planned Interventions: self care training, functional mobility training, therapeutic exercise, balance training, therapeutic activities, endurance activities, patient education and home safety training    Frequency/Duration: Patient will be followed by occupational therapy 5 times a week to address goals. Recommendation for discharge: (in order for the patient to meet his/her long term goals)  Therapy up to 5 days/week in SNF setting. Recommend SNF at d/c, if SNF not an option recommend [address] with supervision in the home for all OOB mobility and ADL/IADL task performance.      This discharge recommendation:  Has not yet been discussed the attending provider and/or case management    IF patient discharges home will need the following DME: walker: rolling - defer to PT recommendation upon evaluation       SUBJECTIVE:   Patient stated If I'm walking around at home I reach and steady myself with furniture.     OBJECTIVE DATA SUMMARY:   HISTORY:   Past Medical History:   Diagnosis Date    Adverse effect of anesthesia 2005    took a long time to go to sleep with last colonoscopy    CAD (coronary artery disease) 2010    MI? 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    Peripheral neuropathy     BOTH FEET    Shingles 2018     Past Surgical History:   Procedure Laterality Date    COLONOSCOPY N/A 9/9/2016    [facility]  9/9/2016         HX BLADDER SUSPENSION  10/2021    HX CATARACT REMOVAL Bilateral     HX COLONOSCOPY      HX DILATION AND CURETTAGE  10/2021    HX ORTHOPAEDIC      HERNIATED [address] 2017    HX [address]         Expanded or extensive additional review of patient history:     Home Situation  Home Environment: Private residence ([facility])  # Steps to Enter: 0  One/Two Story Residence:  (three stories)  Living Alone: Yes  Support Systems: Child(maury) (son lives nearby)  Current DME Used/Available at Home: Lana Yas, straight  Tub or Shower Type: Tub/Shower combination      EXAMINATION OF PERFORMANCE DEFICITS:  Cognitive/Behavioral Status:  Neurologic State: Alert  Orientation Level: Oriented X4  Cognition:  (decreased safety awareness)  Perception: Appears intact  Perseveration: No perseveration noted  Safety/Judgement: Decreased awareness of need for assistance;Decreased awareness of need for safety    Skin: visually intact    Edema: none noted    Hearing:   Auditory  Auditory Impairment: Hearing aid(s)  Hearing Aids/Status: With patient    Vision/Perceptual:    Tracking: Able to track stimulus in all quadrants w/o difficulty    Saccades: Within Defined Limits         Visual Fields:  (Able to detect stimulus in all quadrants)  Diplopia: No    Acuity: Within Defined Limits    Corrective Lenses: Glasses    Range of Motion:    AROM: Within functional limits  PROM: Within functional limits                      Strength:    Strength: Generally decreased, functional                Coordination:  Coordination: Generally decreased, functional  Fine Motor Skills-Upper: Left Intact; Right Intact    Gross Motor Skills-Upper: Left Intact; Right Intact    Tone & Sensation:    Tone: Normal  Sensation: Intact (baseline neuropathy)                      Balance:  Sitting: Intact (with BUE prximal support at EOB)  Standing: Impaired; With support  Standing - Static: Good  Standing - Dynamic : Fair;Constant support    Functional Mobility and Transfers for ADLs:  Bed Mobility:  Supine to Sit: Stand-by assistance  Sit to Supine: Stand-by assistance  Scooting: Stand-by assistance    Transfers:  Sit to Stand: Stand-by assistance  Stand to Sit: Stand-by assistance  Toilet Transfer : Stand-by assistance (inferred)    ADL Assessment:  Feeding: Independent (inferred)    Oral Facial Hygiene/Grooming: Stand-by assistance (inferred)    Bathing: Stand-by assistance (inferred)    Upper Body Dressing: Independent    Lower Body Dressing: Stand-by assistance (inferred)    Toileting: Stand by assistance (inferred)                ADL Intervention and task modifications:                                     Cognitive Retraining  Attention to Task: Single task  Following Commands: Follows one step commands/directions  Safety/Judgement: Decreased awareness of need for assistance;Decreased awareness of need for safety  Cues: Verbal cues provided; Tactile cues provided    Functional Measure:    Barthel Index:  Bathin  Bladder: 10  Bowels: 10  Groomin  Dressin  Feeding: 10  Mobility: 0  Stairs: 5  Toilet Use: 5  Transfer (Bed to Chair and Back): 10  Total: 60/100      The Barthel ADL Index: Guidelines  1. The index should be used as a record of what a patient does, not as a record of what a patient could do. 2. The main aim is to establish degree of independence from any help, physical or verbal, however minor and for whatever reason. 3. The need for supervision renders the patient not independent. 4. A patient's performance should be established using the best available evidence. Asking the patient, friends/relatives and nurses are the usual sources, but direct observation and common sense are also important. However direct testing is not needed. 5. Usually the patient's performance over the preceding 24-48 hours is important, but occasionally longer periods will be relevant. 6. Middle categories imply that the patient supplies over 50 per cent of the effort. 7. Use of aids to be independent is allowed. Score Interpretation (from [facility])    Independent   60-79 Minimally independent   40-59 Partially dependent   20-39 Very dependent   <20 Totally dependent     -Erica Alcocer., Barthel, DBhavanaW. (1965). Functional evaluation: the Barthel Index. [facility] ([address] (1997). The Barthel activities of daily living index: self-reporting versus actual performance in the old (> or = 75 years). Journal of [address] [place] [address] 45(7), 14 [facility], .J.M.F, Orly Longo., Gurmeet Argue. (1999). Measuring the change in disability after inpatient rehabilitation; comparison of the responsiveness of the Barthel Index and Functional [place] Measure. Journal of Neurology, Neurosurgery, and Psychiatry, 66(4), 791-172. AVELINO Carney.ROBINSON, BOYD Cornelius, & Courtney Starks MIWONA. (2004) Assessment of post-stroke quality of life in cost-effectiveness studies: The usefulness of the Barthel Index and the EuroQoL-5D.  Quality of Life Research, 15, 596-39     Occupational Therapy Evaluation Charge Determination   History Examination Decision-Making   LOW Complexity : Brief history review  LOW Complexity : 1-3 performance deficits relating to physical, cognitive , or psychosocial skils that result in activity limitations and / or participation restrictions  MEDIUM Complexity : Patient may present with comorbidities that affect occupational performnce. Miniml to moderate modification of tasks or assistance (eg, physical or verbal ) with assesment(s) is necessary to enable patient to complete evaluation       Based on the above components, the patient evaluation is determined to be of the following complexity level: LOW   Pain Rating:  Pt did not report pain this session. Activity Tolerance:   Fair, limited by decreased standing balance     After treatment patient left in no apparent distress:    Supine in bed and Call bell within reach, RN notified    COMMUNICATION/EDUCATION:   The patients plan of care was discussed with: Physical therapist and Registered nurse. Patient/family agree to work toward stated goals and plan of care. This patients plan of care is appropriate for delegation to [facility].     Thank you for this referral.  Lakesha Blackmon, OT  Time Calculation: 23 mins

## 2021-10-20 NOTE — PROGRESS NOTES
Overnight Hospitalist Progress Note    Name: Leander Wolfe  YOB: 1935  MRN: [id number]  Admission Date: 10/19/2021    Date of service: 10/20/21, 3:06 AM          ____________________________________________________________________________    Noted critical D-dimer result 2.62. Patient admitted in the evening on 10/19/2021 after several syncopal episodes with bradycardia. She had no chest pain shortness of breath or cough. Asked by Dr. Hanane Reagan to order CTA if D-dimer elevated. CTA: No pulmonary embolism or acute abnormality in the chest.  Cardiomegaly, PA hypertension and small hiatal hernia noted. Patient currently boarding in the ED awaiting transfer to telemetry bed.   She has no reported complaints at present      ____________________________________________________________________________    Carlos Tatum, LONI, RN, NP-C  [phone] or via [address] [place] [address]

## 2021-10-20 NOTE — ADVANCED PRACTICE NURSE
[facility] [address] [place] [address] Drive, [address] [place] [address] [address]   Domonique Samson   [phone]  Katlyn Gasca  10/20/2021  4:48 PM      10/19/21    ECHO ADULT COMPLETE 10/20/2021 10/20/2021    Interpretation Summary  · LV: Estimated LVEF is 55 - 60%. Normal cavity size, wall thickness and systolic function (ejection fraction normal). Wall motion: normal.  · MV: Mild mitral annular calcification. · Echo study was technically difficulty. Signed by: Laci Mcgee MD on 10/20/2021  1:02 PM        Normal echo. Normal EF and valve structures. Needs no further cardiac testing at this time. OK for discharge from a Cardiology standpoint when ready. Patient can follow up outpatient, if she chooses, for any further testing. Will see again as needed.       Rodger Reyna PA-C

## 2021-10-20 NOTE — CONSULTS
[facility]      Cardiology Care Note                                    Initial visit      Patient Name: Brandy Jordan - :1935 - TYV:[id number]  Primary Cardiologist: none  Consulting Cardiologist: Suyapa Campos MD  Reason for Consult: syncope     Subjective:   Feeling better. No c/o CP or SOB at this time. No dizziness or light headedness. No palpitations       Assessment and Plan     1. Syncope   - suspect related to post op anesthesia, miguel ángel and hypotension (BP by EMS on arrival 108/44)   - HR 44 on EMS arrival.  Atropine 0.5 x 2 given. HR improved, as did patient cognition   - No changed on EKG indicative of ACS. Hs Trop 4,5,5.   - echo pending  2. POD 1 - [place] colpoclesis and sling for Prolapse and incontinence    - OB/Gyn following   - Ddimer elevation related to surgery  3. HTN   - PTA Cozaar. - continue to hold  4. GERD      Syncopal episode as related to post [facility] colpoclesis and sling for Prolapse and incontinence, with general anesthesia. HR 44 as well as /44 - per EMS report. Improved now that HR and BP have recovered. EKG and Hs trop suggest no cardiac involvement. Echo pending. Needs no further cardiac testing at this time. Patient seen on the day of progress note and examined  and agree with Advance Practice Provider (PEDRO, NP,PA)  assessment and plans. The patient feels much better. She denies any previous episode of syncope. She has had no chest pain or shortness of breath. Suspect syncope possibly related to anesthesia but also vasovagal with bradycardia and hypotension    BUN was slightly elevated. Check orthostatics continue to hold Cozaar. IV hydration. She has ruled out for myocardial infarction with serial cardiac markers. No acute ischemic changes on EKG.     Nonetheless there are Q waves in inferior leads suggestive of previous inferior myocardial infarction age undetermined. Q waves in inferior leads were present several years ago as well. At some point consider proceeding as an outpatient with a stress test nonetheless. (Last stress test approximately 11 years ago.)    Proceed with echo.            ____________________________________________________________      HPI:  Patient had a bladder tack surgery done today by OB/GYN, did well, went home, at home patient felt dizzy and lightheaded, EMS was called, when they arrived patient had 2 witnessed syncopal events. It took a little while for her to get from upstairs to downstairs, she had some bradycardia, was given 0.5 mg of atropine, heart rate improved, she was brought to the hospital and was requested to be admitted to the hospitalist service. Currently the patient is resting in bed, reports that she still feels \"foggy in her head\" and weak but denies any other complaints or problems. I spoke with patient's son Preethi Caldera and he corroborated the story. 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       Review of Systems:    [] Patient unable to provide secondary to condition    CONSTITUTIONAL: negative     ENT/MOUTH: negative     EYES: negative    CARDIOVASCULAR: negative     RESPIRATORY: negative      GASTROINTESTINAL: negative     GENITOURINARY: negative     MUSCULOSKELETAL: negative      SKIN: negative    NEUROLOGICAL: negative     PSYCHOLOGICAL: negative      HEME/LYMPH: negative    ENDOCRINE: negative        Past Medical History:   Diagnosis Date    Adverse effect of anesthesia 2005    took a long time to go to sleep with last colonoscopy    CAD (coronary artery disease) 2010    MI?  Chronic pain     hip rt    Fuchs' corneal dystrophy     Gastrointestinal disorder     GERD    GERD (gastroesophageal reflux disease)     Glaucoma     High cholesterol     Hypertension     Ill-defined condition     osteopenia    Ill-defined condition     pulled shoulder lt.  and elbow replacement left    Peripheral neuropathy     BOTH FEET    Shingles 2018     Past Surgical History:   Procedure Laterality Date    COLONOSCOPY N/A 9/9/2016    COLON[facility]ul [facility]  9/9/2016         HX BLADDER SUSPENSION  10/2021    HX CATARACT REMOVAL Bilateral     HX COLONOSCOPY      HX DILATION AND CURETTAGE  10/2021    HX ORTHOPAEDIC      HERNIATED DISC 2017    HX ORTHOPAEDIC       Current Facility-Administered Medications   Medication Dose Route Frequency    aspirin delayed-release tablet 81 mg  81 mg Oral DAILY    pantoprazole (PROTONIX) tablet 20 mg  20 mg Oral ACB    atorvastatin (LIPITOR) tablet 10 mg  10 mg Oral QHS    sodium chloride (NS) flush 5-40 mL  5-40 mL IntraVENous Q8H    sodium chloride (NS) flush 5-40 mL  5-40 mL IntraVENous PRN    acetaminophen (TYLENOL) tablet 650 mg  650 mg Oral Q4H PRN    0.9% sodium chloride infusion  75 mL/hr IntraVENous CONTINUOUS     Current Outpatient Medications   Medication Sig    ibuprofen (MOTRIN) 600 mg tablet Take 1 Tablet by mouth every six (6) hours as needed for Pain.    HYDROcodone-acetaminophen (Norco) 5-325 mg per tablet Take 1 Tablet by mouth every four (4) hours as needed for Pain for up to 3 days. Max Daily Amount: 6 Tablets. Indications: pain    losartan (COZAAR) 100 mg tablet Take 100 mg by mouth Every morning.  sodium chloride (Walt 128) 5 % ophthalmic solution Administer 1 Drop to both eyes as needed.  omeprazole (PriLOSEC OTC) 20 mg tablet Take 20 mg by mouth as needed.  simvastatin (ZOCOR) 20 mg tablet TAKE 1 TABLET NIGHTLY    cholecalciferol, vitamin D3, (VITAMIN D3) 2,000 unit tab Take 1 Tablet by mouth daily.  timolol maleate 0.5 % DrpD ophthalmic solution Administer 1 Drop to both eyes daily.  aspirin delayed-release (ASPIR-LOW) 81 mg tablet Take 81 mg by mouth daily. Indications: prevention for a blood clot going to the brain, prevention of transient ischemic attack       Allergies   Allergen Reactions    Alendronate Nausea Only     Other reaction(s): Abdominal Pain    Sulfamethoxazole-Trimethoprim Other (comments)     thrush        FmHx: family history includes Atrial Fibrillation in her mother and sister; Cancer in her father; Hypertension in her mother; Other in her brother. Social Hx :  reports that she quit smoking about 62 years ago. She has a 1.00 pack-year smoking history. She has never used smokeless tobacco. She reports current alcohol use. She reports that she does not use drugs. Objective:    Physical Exam    Vitals:   Vitals:    10/20/21 0100 10/20/21 0200 10/20/21 0300 10/20/21 0400   BP: (!) 118/53 (!) 108/48 (!) 115/50 (!) 118/54   Pulse: 67 (!) 50 (!) 58 60   Resp: 14 14 16    Temp:       SpO2: 94% 95% 94%        General:    Alert, cooperative, no distress, appears stated age. Neck:   Supple, no carotid bruit and no JVD. Back:     Symmetric, normal curvature. Lungs:     clear to auscultation bilaterally. Heart[de-identified]    Regular rate and rhythm, S1, S2 normal, no murmur, click, rub or gallop.    Abdomen: Soft, non-tender. Bowel sounds normal.    Extremities:   Extremities normal, atraumatic, no cyanosis or edema. Vascular:   Pulses - 2+ radials   Skin:   Skin color normal. No rashes or lesions on visible areas   Neurologic:   CN II-XII grossly intact. Telemetry: normal sinus rhythm    ECG:   EKG Results     Procedure 720 Value Units Date/Time    EKG, 12 LEAD, INITIAL [[id number]] Collected: 10/19/21 1610    Order Status: Completed Updated: 10/19/21 1612     Ventricular Rate 83 BPM      Atrial Rate 83 BPM      P-R Interval 134 ms      QRS Duration 82 ms      Q-T Interval 408 ms      QTC Calculation (Bezet) 479 ms      Calculated P Axis 50 degrees      Calculated R Axis -8 degrees      Calculated T Axis 26 degrees      Diagnosis --     Normal sinus rhythm  Minimal voltage criteria for LVH, may be normal variant ( R in aVL )  Inferior infarct (cited on or before 31-JUL-2011)  Abnormal ECG  When compared with ECG of 13-OCT-2021 10:38,  T wave inversion more evident in Inferior leads  Nonspecific T wave abnormality now evident in Anterolateral leads            Data Review:     Radiology:   XR Results (most recent):  Results from Hospital Encounter encounter on 10/19/21    XR CHEST PORT    Narrative  Indication: Syncope    Comparison: 6/6/2017    Portable exam of the chest obtained at 1700 demonstrates normal heart size. There is no acute process in the lung fields. The osseous structures are  unremarkable. Impression  No acute process. Recent Labs     10/20/21  0331 10/19/21  2134   CPK 83 113     Recent Labs     10/20/21  0331 10/19/21  1615    139   K 4.4 4.2   * 110*   CO2 24 22   BUN 22* 24*   CREA 0.62 0.76   * 187*   CA 8.4* 9.5     Recent Labs     10/20/21  0331 10/19/21  1615   WBC 14.6* 16.3*   HGB 11.1* 12.3   HCT 34.4* 37.2    199     Recent Labs     10/19/21  1615   AP 74     No results for input(s): CHOL, LDLC in the last 72 hours.     No lab exists for component: TGL, HDLC,  HBA1C  Recent Labs     10/20/21  0331   TSH 0.35*       Tyler R. Waunita Kocher, MD      [facility] [address] [address]  [address]  [phone]      Jarvis Sampson MD

## 2021-10-20 NOTE — PROGRESS NOTES
Urogyn note-    S- patient states she is doing well, no pain, minimal bleeding, was able to void, no CP or SOB. Recounted the episodes from yesterday to me \"walked up the stairs a little too fast and then felt winded and then apparently fainted\". O-  Visit Vitals  BP (!) 118/54   Pulse 60   Temp 98.1 °F (36.7 °C)   Resp 16   SpO2 94%       Intake/Output Summary (Last 24 hours) at 10/20/2021 0708  Last data filed at 10/19/2021 2251  Gross per 24 hour   Intake --   Output 50 ml   Net -50 ml     NAD  ABD- soft, mildly distended, expected mons bruising but incisions intact  Pelvic- No prolapse noted, no active bleeding  LExt- no CT    A- POD 1 [place] colpoclesis and sling for Prolapse and incontinence readmitted POD 0 after syncopal episode. P-  - Ok to monitor voiding. If not voiding regularly q 4-5 hours with regular PO intake, then bladder scan. Call me on my cell [phone] if any concerns. Bleeding and exam wnl. Pain well controlled. Would avoid narcotics as minimal pain and only requiring ibuprofen and tylenol prn. CV/ Neuro- It seems the working dx is post op syncope with vasovagal as etiology. Test to date wnl. D Dimer elevated. Echo pending. Appreciate hospitalist's recommendation. Call with any updates or questions [phone]. DVT prophylaxis.

## 2021-10-20 NOTE — ED NOTES
Bedside shift change report given to Randall Blandon (oncoming nurse) by Carmen Mcqueen (offgoing nurse). Report included the following information SBAR, Kardex, ED Summary and Recent Results.

## 2021-10-20 NOTE — PROGRESS NOTES
Hospitalist Progress Note    NAME: Tod Ennis   :  1935   MRN:  [id number]     Subjective:   Daily Progress Note: 10/20/2021 12:41 PM      Chief complaint: Admitted for postop syncope and bradycardia  Patient seen and examined, chart was reviewed patient still in the emergency room. Patient denies dizziness, nausea, vomiting, diarrhea at this time. Seen by cardiology and OB/GYN waiting for echo and other results. Current Facility-Administered Medications   Medication Dose Route Frequency    etomidate (AMIDATE) 2 mg/mL injection        propofoL (DIPRIVAN) 10 mg/mL injection        aspirin delayed-release tablet 81 mg  81 mg Oral DAILY    pantoprazole (PROTONIX) tablet 20 mg  20 mg Oral ACB    atorvastatin (LIPITOR) tablet 10 mg  10 mg Oral QHS    sodium chloride (NS) flush 5-40 mL  5-40 mL IntraVENous Q8H    sodium chloride (NS) flush 5-40 mL  5-40 mL IntraVENous PRN    acetaminophen (TYLENOL) tablet 650 mg  650 mg Oral Q4H PRN    0.9% sodium chloride infusion  75 mL/hr IntraVENous CONTINUOUS     Current Outpatient Medications   Medication Sig    ibuprofen (MOTRIN) 600 mg tablet Take 1 Tablet by mouth every six (6) hours as needed for Pain.  HYDROcodone-acetaminophen (Norco) 5-325 mg per tablet Take 1 Tablet by mouth every four (4) hours as needed for Pain for up to 3 days. Max Daily Amount: 6 Tablets. Indications: pain    losartan (COZAAR) 100 mg tablet Take 100 mg by mouth Every morning.  sodium chloride (Walt 128) 5 % ophthalmic solution Administer 1 Drop to both eyes as needed.  omeprazole (PriLOSEC OTC) 20 mg tablet Take 20 mg by mouth as needed.  simvastatin (ZOCOR) 20 mg tablet TAKE 1 TABLET NIGHTLY    cholecalciferol, vitamin D3, (VITAMIN D3) 2,000 unit tab Take 1 Tablet by mouth daily.  timolol maleate 0.5 % DrpD ophthalmic solution Administer 1 Drop to both eyes daily.  aspirin delayed-release (ASPIR-LOW) 81 mg tablet Take 81 mg by mouth daily. Indications: prevention for a blood clot going to the brain, prevention of transient ischemic attack          Objective:     Visit Vitals  BP (!) 115/50   Pulse 60   Temp 98.1 °F (36.7 °C)   Resp 16   Ht 5' 5\" (1.651 m)   Wt 75.8 kg (167 lb)   SpO2 94%   BMI 27.79 kg/m²      O2 Device: None (Room air)    Temp (24hrs), Av °F (36.7 °C), Min:97.6 °F (36.4 °C), Max:98.7 °F (37.1 °C)        PHYSICAL EXAM:  General well developed and nourished  Neck supple  CVS RRR  Respiratory symmetric expansion, no distress  Abdomen obese soft  Ext moves all  Neuro AAOx3  Psych Normal affect  Skin no visible rash      Data Review    Recent Results (from the past 24 hour(s))   EKG, 12 LEAD, INITIAL    Collection Time: 10/19/21  4:10 PM   Result Value Ref Range    Ventricular Rate 83 BPM    Atrial Rate 83 BPM    P-R Interval 134 ms    QRS Duration 82 ms    Q-T Interval 408 ms    QTC Calculation (Bezet) 479 ms    Calculated P Axis 50 degrees    Calculated R Axis -8 degrees    Calculated T Axis 26 degrees    Diagnosis       Normal sinus rhythm  Minimal voltage criteria for LVH, may be normal variant ( R in aVL )  Inferior infarct (cited on or before 2011)  Abnormal ECG  When compared with ECG of 13-OCT-2021 10:38,  T wave inversion more evident in Inferior leads  Nonspecific T wave abnormality now evident in Anterolateral leads     CBC WITH AUTOMATED DIFF    Collection Time: 10/19/21  4:15 PM   Result Value Ref Range    WBC 16.3 (H) 3.6 - 11.0 K/uL    RBC 4.00 3.80 - 5.20 M/uL    HGB 12.3 11.5 - 16.0 g/dL    HCT 37.2 35.0 - 47.0 %    MCV 93.0 80.0 - 99.0 FL    MCH 30.8 26.0 - 34.0 PG    MCHC 33.1 30.0 - 36.5 g/dL    RDW 13.0 11.5 - 14.5 %    PLATELET 782 918 - 906 K/uL    MPV 10.6 8.9 - 12.9 FL    NRBC 0.0 0  WBC    ABSOLUTE NRBC 0.00 0.00 - 0.01 K/uL    NEUTROPHILS 90 (H) 32 - 75 %    LYMPHOCYTES 7 (L) 12 - 49 %    MONOCYTES 2 (L) 5 - 13 %    EOSINOPHILS 0 0 - 7 %    BASOPHILS 0 0 - 1 %    IMMATURE GRANULOCYTES 1 (H) 0.0 - 0.5 %    ABS. NEUTROPHILS 14.7 (H) 1.8 - 8.0 K/UL    ABS. LYMPHOCYTES 1.1 0.8 - 3.5 K/UL    ABS. MONOCYTES 0.3 0.0 - 1.0 K/UL    ABS. EOSINOPHILS 0.0 0.0 - 0.4 K/UL    ABS. BASOPHILS 0.0 0.0 - 0.1 K/UL    ABS. IMM. GRANS. 0.2 (H) 0.00 - 0.04 K/UL    DF SMEAR SCANNED      RBC COMMENTS NORMOCYTIC, NORMOCHROMIC     METABOLIC PANEL, COMPREHENSIVE    Collection Time: 10/19/21  4:15 PM   Result Value Ref Range    Sodium 139 136 - 145 mmol/L    Potassium 4.2 3.5 - 5.1 mmol/L    Chloride 110 (H) 97 - 108 mmol/L    CO2 22 21 - 32 mmol/L    Anion gap 7 5 - 15 mmol/L    Glucose 187 (H) 65 - 100 mg/dL    BUN 24 (H) 6 - 20 MG/DL    Creatinine 0.76 0.55 - 1.02 MG/DL    BUN/Creatinine ratio 32 (H) 12 - 20      GFR est AA >60 >60 ml/min/1.73m2    GFR est non-AA >60 >60 ml/min/1.73m2    Calcium 9.5 8.5 - 10.1 MG/DL    Bilirubin, total 0.4 0.2 - 1.0 MG/DL    ALT (SGPT) 26 12 - 78 U/L    AST (SGOT) 20 15 - 37 U/L    Alk. phosphatase 74 45 - 117 U/L    Protein, total 6.2 (L) 6.4 - 8.2 g/dL    Albumin 3.3 (L) 3.5 - 5.0 g/dL    Globulin 2.9 2.0 - 4.0 g/dL    A-G Ratio 1.1 1.1 - 2.2     TROPONIN-HIGH SENSITIVITY    Collection Time: 10/19/21  4:15 PM   Result Value Ref Range    Troponin-High Sensitivity 4 0 - 51 ng/L   SAMPLES BEING HELD    Collection Time: 10/19/21  4:15 PM   Result Value Ref Range    SAMPLES BEING HELD 1BLU     COMMENT        Add-on orders for these samples will be processed based on acceptable specimen integrity and analyte stability, which may vary by analyte.    CK    Collection Time: 10/19/21  9:34 PM   Result Value Ref Range     26 - 192 U/L   TROPONIN-HIGH SENSITIVITY    Collection Time: 10/19/21  9:34 PM   Result Value Ref Range    Troponin-High Sensitivity 5 0 - 51 ng/L   MAGNESIUM    Collection Time: 10/19/21  9:34 PM   Result Value Ref Range    Magnesium 2.0 1.6 - 2.4 mg/dL   D DIMER    Collection Time: 10/19/21  9:35 PM   Result Value Ref Range    D-dimer 2.62 (H) 0.00 - 0.65 mg/L FEU   CULTURE, BLOOD, PAIRED    Collection Time: 10/19/21  9:54 PM    Specimen: Blood   Result Value Ref Range    Special Requests: NO SPECIAL REQUESTS      Culture result: NO GROWTH AFTER 5 HOURS     URINALYSIS W/MICROSCOPIC    Collection Time: 10/20/21  1:45 AM   Result Value Ref Range    Color YELLOW/STRAW      Appearance CLEAR CLEAR      Specific gravity 1.015 1.003 - 1.030      pH (UA) 5.0 5.0 - 8.0      Protein Negative NEG mg/dL    Glucose Negative NEG mg/dL    Ketone 15 (A) NEG mg/dL    Bilirubin Negative NEG      Blood LARGE (A) NEG      Urobilinogen 0.2 0.2 - 1.0 EU/dL    Nitrites Negative NEG      Leukocyte Esterase Negative NEG      WBC 0-4 0 - 4 /hpf    RBC 20-50 0 - 5 /hpf    Epithelial cells FEW FEW /lpf    Bacteria Negative NEG /hpf    Mucus TRACE (A) NEG /lpf   URINE CULTURE HOLD SAMPLE    Collection Time: 10/20/21  1:45 AM    Specimen: Serum   Result Value Ref Range    Urine culture hold        Urine on hold in Microbiology dept for 2 days. If unpreserved urine is submitted, it cannot be used for addtional testing after 24 hours, recollection will be required.    TSH 3RD GENERATION    Collection Time: 10/20/21  3:31 AM   Result Value Ref Range    TSH 0.35 (L) 0.36 - 2.92 uIU/mL   METABOLIC PANEL, BASIC    Collection Time: 10/20/21  3:31 AM   Result Value Ref Range    Sodium 139 136 - 145 mmol/L    Potassium 4.4 3.5 - 5.1 mmol/L    Chloride 113 (H) 97 - 108 mmol/L    CO2 24 21 - 32 mmol/L    Anion gap 2 (L) 5 - 15 mmol/L    Glucose 126 (H) 65 - 100 mg/dL    BUN 22 (H) 6 - 20 MG/DL    Creatinine 0.62 0.55 - 1.02 MG/DL    BUN/Creatinine ratio 35 (H) 12 - 20      GFR est AA >60 >60 ml/min/1.73m2    GFR est non-AA >60 >60 ml/min/1.73m2    Calcium 8.4 (L) 8.5 - 10.1 MG/DL   CBC WITH AUTOMATED DIFF    Collection Time: 10/20/21  3:31 AM   Result Value Ref Range    WBC 14.6 (H) 3.6 - 11.0 K/uL    RBC 3.61 (L) 3.80 - 5.20 M/uL    HGB 11.1 (L) 11.5 - 16.0 g/dL    HCT 34.4 (L) 35.0 - 47.0 %    MCV 95.3 80.0 - 99.0 FL    MCH 30.7 26.0 - 34.0 PG    MCHC 32.3 30.0 - 36.5 g/dL    RDW 13.3 11.5 - 14.5 %    PLATELET 188 168 - 870 K/uL    MPV 10.6 8.9 - 12.9 FL    NRBC 0.0 0  WBC    ABSOLUTE NRBC 0.00 0.00 - 0.01 K/uL    NEUTROPHILS 84 (H) 32 - 75 %    LYMPHOCYTES 10 (L) 12 - 49 %    MONOCYTES 6 5 - 13 %    EOSINOPHILS 0 0 - 7 %    BASOPHILS 0 0 - 1 %    IMMATURE GRANULOCYTES 0 0.0 - 0.5 %    ABS. NEUTROPHILS 12.2 (H) 1.8 - 8.0 K/UL    ABS. LYMPHOCYTES 1.5 0.8 - 3.5 K/UL    ABS. MONOCYTES 0.8 0.0 - 1.0 K/UL    ABS. EOSINOPHILS 0.0 0.0 - 0.4 K/UL    ABS. BASOPHILS 0.1 0.0 - 0.1 K/UL    ABS. IMM. GRANS. 0.1 (H) 0.00 - 0.04 K/UL    DF AUTOMATED     CK    Collection Time: 10/20/21  3:31 AM   Result Value Ref Range    CK 83 26 - 192 U/L   TROPONIN-HIGH SENSITIVITY    Collection Time: 10/20/21  3:31 AM   Result Value Ref Range    Troponin-High Sensitivity 5 0 - 51 ng/L   SAMPLES BEING HELD    Collection Time: 10/20/21  3:31 AM   Result Value Ref Range    SAMPLES BEING HELD 1BLUE     COMMENT        Add-on orders for these samples will be processed based on acceptable specimen integrity and analyte stability, which may vary by analyte. ECHO ADULT COMPLETE    Collection Time: 10/20/21 11:46 AM   Result Value Ref Range    IVSd 1.12 (A) 0.60 - 0.90 cm    LVIDd 5.05 3.90 - 5.30 cm    LVIDs 3.70 cm    LVOT d 2.06 cm    LVPWd 1.44 (A) 0.60 - 0.90 cm    LVOT Peak Gradient 3.82 mmHg    LVOT Peak Velocity 97.78 cm/s    LA Volume 63.45 22.0 - 52.0 mL    LA Area 4C 21.74 cm2    LA Vol 2C 58.64 (A) 22.00 - 52.00 mL    LA Vol 4C 60.79 (A) 22.00 - 52.00 mL    Aortic Valve Area by Continuity of Peak Velocity 2.20 cm2    AoV PG 8.78 mmHg    Aortic Valve Systolic Peak Velocity 264. 16 cm/s    MV A Suresh 114.48 cm/s    Mitral Valve E Wave Deceleration Time 393.80 ms    MV E Suresh 65.77 cm/s    Mitral Valve Pressure Half-time 114.20 ms    MVA (PHT) 1.93 cm2    Pulmonic Valve Systolic Peak Instantaneous Gradient 4.80 mmHg    Tapse 2.30 (A) 1.50 - 2.00 cm    Ao Root D 3.28 cm    MV E/A 0.57     LV Mass .1 67.0 - 162.0 g    LV Mass AL Index 142.1 43.0 - 95.0 g/m2    LA Vol Index 34.67 16.00 - 28.00 ml/m2    LA Vol Index 32.04 16.00 - 28.00 ml/m2    LA Vol Index 33.22 16.00 - 28.00 ml/m2    ISHA/BSA Pk Suresh 1.2 cm2/m2     No results found for this visit on 10/19/21. All Micro Results     Procedure Component Value Units Date/Time    CULTURE, BLOOD, PAIRED [[id number]] Collected: 10/19/21 9000    Order Status: Completed Specimen: Blood Updated: 10/20/21 0508     Special Requests: NO SPECIAL REQUESTS        Culture result: NO GROWTH AFTER 5 HOURS       URINE CULTURE HOLD SAMPLE [[id number]] Collected: 10/20/21 0145    Order Status: Completed Specimen: Serum Updated: 10/20/21 0153     Urine culture hold       Urine on hold in Microbiology dept for 2 days. If unpreserved urine is submitted, it cannot be used for addtional testing after 24 hours, recollection will be required. Radiology reports and films for the last 24 hours have been reviewed. CTA chest   IMPRESSION  1. No acute pulmonary embolism or other acute abnormality in the chest.     2. Mild cardiomegaly and pulmonary artery hypertension.     3. Small to moderate hiatal hernia. A fluid-filled esophagus may represent  reflux or delayed passage of ingested contents.       Assessment/Plan:     Syncope/Bradycardia-seen by cardiology, suspect vasovagal from recent postop surgery, needed atropine by EMS PTA as heart rate was 44. Cardiac enzymes negative. D-dimer elevated but CT angio chest negative. Echocardiogram pending. May need Holter/event monitor at [place]. Defer further management to cardiology team    Leukocytosis-likely reactive, trended down to 14 K continue to monitor. Check pro Chiki.  Follow blood culture  Hyperlipidemia-continue home medications  Hypertension-continue to monitor BP off meds,  check orthostatic blood pressure.    Kun Morales colpoclesis and sling for Prolapse and incontinence   OB/Gyn following  Abnormal TSH 0.3- repeat TSH and get FT4  Hiatal Hernia seen on CTA chest- cont PPI and f/u PCP as OP    DVT PRx scd  AD FC  NOK    Dispo: 24hrs get PT/OT eval              Signed By: Verna Lopez MD     October 20, 2021

## 2021-10-20 NOTE — ED NOTES
Assumed care of pt, report from [facility], [place], operated by [facility].

## 2021-10-20 NOTE — H&P
History & Physical    Primary Care Provider: Humberto Fishman MD  Source of Information: Patient     History of Presenting Illness:   [place] is a 81 y.o. female who presents with syncope    History was primarily obtained from the patient    Patient had a bladder tack surgery done today by OB/GYN, did well, went home, at home patient felt dizzy and lightheaded, EMS was called, when they arrived patient had 2 witnessed syncopal events. It took a little while for her to get from upstairs to downstairs, she had some bradycardia, was given 0.5 mg of atropine, heart rate improved, she was brought to the hospital and was requested to be admitted to the hospitalist service. Currently the patient is resting in bed, reports that she still feels \"foggy in her head\" and weak but denies any other complaints or problems. I spoke with patient's son Romulo Quispe and he corroborated the story. Patient denies any other complaints or problems    The patient denies any Headache, blurry vision, sore throat, trouble swallowing, trouble with speech, chest pain, SOB, cough, fever, chills, N/V/D, abd pain, urinary symptoms, constipation, recent travels, sick contacts, focal or generalized neurological symptoms,  falls, injuries, rashes, contact with COVID-19 diagnosed patients, hematemesis, melena, hemoptysis, hematuria, rashes, denies starting any new medications and denies any other concerns or problems besides as mentioned above. Review of Systems:  A comprehensive review of systems was negative except for that written in the History of Present Illness. All other systems reviewed, pertinent positives and negatives noted in HPI    Past Medical History:   Diagnosis Date    Adverse effect of anesthesia 2005    took a long time to go to sleep with last colonoscopy    CAD (coronary artery disease) 2010    MI?    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    Peripheral neuropathy     BOTH FEET    Shingles 2018      Past Surgical History:   Procedure Laterality Date    COLONOSCOPY N/A 9/9/2016    [facility]  9/9/2016         HX CATARACT REMOVAL Bilateral     HX COLONOSCOPY      HX ORTHOPAEDIC      HERNIATED [facility] [address] 2017    HX ORTHOPAEDIC       Prior to Admission medications    Medication Sig Start Date End Date Taking? Authorizing Provider   ibuprofen (MOTRIN) 600 mg tablet Take 1 Tablet by mouth every six (6) hours as needed for Pain. 10/19/21   Trace Temple MD   HYDROcodone-acetaminophen (Norco) 5-325 mg per tablet Take 1 Tablet by mouth every four (4) hours as needed for Pain for up to 3 days. Max Daily Amount: 6 Tablets. Indications: pain 10/19/21 10/22/21  Trace Temple MD   losartan (COZAAR) 100 mg tablet Take 100 mg by mouth Every morning. Provider, Historical   sodium chloride (Walt 128) 5 % ophthalmic solution Administer 1 Drop to both eyes as needed. Provider, Historical   omeprazole (PriLOSEC OTC) 20 mg tablet Take 20 mg by mouth as needed. Provider, Historical   simvastatin (ZOCOR) 20 mg tablet TAKE 1 TABLET NIGHTLY 6/9/20   Andrew Lazaro MD   cholecalciferol, vitamin D3, (VITAMIN D3) 2,000 unit tab Take 1 Tablet by mouth daily. Provider, Historical   timolol maleate 0.5 % DrpD ophthalmic solution Administer 1 Drop to both eyes daily. Provider, Historical   aspirin delayed-release (ASPIR-LOW) 81 mg tablet Take 81 mg by mouth daily.  Indications: prevention for a blood clot going to the brain, prevention of transient ischemic attack 7/31/11   Other, MD Wilfrid     Allergies   Allergen Reactions    Alendronate Nausea Only Other reaction(s): Abdominal Pain    Sulfamethoxazole-Trimethoprim Other (comments)     thrush      Family History   Problem Relation Age of Onset    Hypertension Mother     Atrial Fibrillation Mother     Cancer Father     Atrial Fibrillation Sister     Other Brother         detached retina    Anesth Problems Neg Hx       Family history was discussed with the patient, all pertinent and relevant details are mentioned as above, no other pertinent and relevant family history was noted on my discussion with the patient.   Patient specifically denies any history of Gaucher disease in the family  SOCIAL HISTORY:  Patient resides:  Independently x   Assisted Living    SNF    With family care       Smoking history:   None    Former x   Chronic      Alcohol history:   None    Social x   Chronic      Ambulates:   Independently x   w/cane    w/walker    w/wc    CODE STATUS:  DNR    Full x   Other      Objective:     Physical Exam:     Visit Vitals  BP (!) 129/58   Pulse 70   Temp 98.7 °F (37.1 °C)   Resp 16   SpO2 95%      O2 Device: None (Room air)    General : alert x 3, awake, no acute distress, resting in bed, pleasant female, appears to be stated age  [de-identified]: PEERL, EOMI, moist mucus membrane, TM clear  Neck: supple, no JVD, no meningeal signs  Chest: Clear to auscultation bilaterally   CVS: S1 S2 heard, Capillary refill less than 2 seconds  Abd: soft/ Non tender, non distended, BS physiological,   Ext: no clubbing, no cyanosis, no edema, brisk 2+ DP pulses  Neuro/Psych: pleasant mood and affect, CN 2-12 grossly intact, sensory grossly within normal limit, Strength 5/5 in all extremities, DTR 1+ x 4  Skin: warm     EKG: Normal sinus rhythm, nonspecific ST changes with possible LVH      Data Review:     Recent Days:  Recent Labs     10/19/21  1615   WBC 16.3*   HGB 12.3   HCT 37.2        Recent Labs     10/19/21  1615      K 4.2   *   CO2 22   *   BUN 24*   CREA 0.76   CA 9.5   ALB 3.3*   ALT 26     No results for input(s): PH, PCO2, PO2, HCO3, FIO2 in the last 72 hours. 24 Hour Results:  Recent Results (from the past 24 hour(s))   EKG, 12 LEAD, INITIAL    Collection Time: 10/19/21  4:10 PM   Result Value Ref Range    Ventricular Rate 83 BPM    Atrial Rate 83 BPM    P-R Interval 134 ms    QRS Duration 82 ms    Q-T Interval 408 ms    QTC Calculation (Bezet) 479 ms    Calculated P Axis 50 degrees    Calculated R Axis -8 degrees    Calculated T Axis 26 degrees    Diagnosis       Normal sinus rhythm  Minimal voltage criteria for LVH, may be normal variant ( R in aVL )  Inferior infarct (cited on or before 31-JUL-2011)  Abnormal ECG  When compared with ECG of 13-OCT-2021 10:38,  T wave inversion more evident in Inferior leads  Nonspecific T wave abnormality now evident in Anterolateral leads     CBC WITH AUTOMATED DIFF    Collection Time: 10/19/21  4:15 PM   Result Value Ref Range    WBC 16.3 (H) 3.6 - 11.0 K/uL    RBC 4.00 3.80 - 5.20 M/uL    HGB 12.3 11.5 - 16.0 g/dL    HCT 37.2 35.0 - 47.0 %    MCV 93.0 80.0 - 99.0 FL    MCH 30.8 26.0 - 34.0 PG    MCHC 33.1 30.0 - 36.5 g/dL    RDW 13.0 11.5 - 14.5 %    PLATELET 580 968 - 030 K/uL    MPV 10.6 8.9 - 12.9 FL    NRBC 0.0 0  WBC    ABSOLUTE NRBC 0.00 0.00 - 0.01 K/uL    NEUTROPHILS 90 (H) 32 - 75 %    LYMPHOCYTES 7 (L) 12 - 49 %    MONOCYTES 2 (L) 5 - 13 %    EOSINOPHILS 0 0 - 7 %    BASOPHILS 0 0 - 1 %    IMMATURE GRANULOCYTES 1 (H) 0.0 - 0.5 %    ABS. NEUTROPHILS 14.7 (H) 1.8 - 8.0 K/UL    ABS. LYMPHOCYTES 1.1 0.8 - 3.5 K/UL    ABS. MONOCYTES 0.3 0.0 - 1.0 K/UL    ABS. EOSINOPHILS 0.0 0.0 - 0.4 K/UL    ABS. BASOPHILS 0.0 0.0 - 0.1 K/UL    ABS. IMM.  GRANS. 0.2 (H) 0.00 - 0.04 K/UL    DF SMEAR SCANNED      RBC COMMENTS NORMOCYTIC, NORMOCHROMIC     METABOLIC PANEL, COMPREHENSIVE    Collection Time: 10/19/21  4:15 PM   Result Value Ref Range    Sodium 139 136 - 145 mmol/L    Potassium 4.2 3.5 - 5.1 mmol/L    Chloride 110 (H) 97 - 108 mmol/L    CO2 22 21 - 32 mmol/L    Anion gap 7 5 - 15 mmol/L    Glucose 187 (H) 65 - 100 mg/dL    BUN 24 (H) 6 - 20 MG/DL    Creatinine 0.76 0.55 - 1.02 MG/DL    BUN/Creatinine ratio 32 (H) 12 - 20      GFR est AA >60 >60 ml/min/1.73m2    GFR est non-AA >60 >60 ml/min/1.73m2    Calcium 9.5 8.5 - 10.1 MG/DL    Bilirubin, total 0.4 0.2 - 1.0 MG/DL    ALT (SGPT) 26 12 - 78 U/L    AST (SGOT) 20 15 - 37 U/L    Alk. phosphatase 74 45 - 117 U/L    Protein, total 6.2 (L) 6.4 - 8.2 g/dL    Albumin 3.3 (L) 3.5 - 5.0 g/dL    Globulin 2.9 2.0 - 4.0 g/dL    A-G Ratio 1.1 1.1 - 2.2     TROPONIN-HIGH SENSITIVITY    Collection Time: 10/19/21  4:15 PM   Result Value Ref Range    Troponin-High Sensitivity 4 0 - 51 ng/L   SAMPLES BEING HELD    Collection Time: 10/19/21  4:15 PM   Result Value Ref Range    SAMPLES BEING HELD 1BLU     COMMENT        Add-on orders for these samples will be processed based on acceptable specimen integrity and analyte stability, which may vary by analyte. Imaging:   CT HEAD WO CONT    Result Date: 10/19/2021  No acute abnormality     XR CHEST PORT    Result Date: 10/19/2021  No acute process. Assessment/Plan     Syncope  Leukocytosis  Hyperlipidemia  Hypertension        Patient will be admitted on a telemetry bed, likely vasovagal syncope, orthostatics  every 4 hours, IV hydration, TSH, B12, folate levels, echocardiogram, CT of the head does not show any acute pathology, telemetry monitoring, cycle troponins, will obtain D-dimer and further intervention per hospital course, continue to monitor  Likely reactive, patient with recent surgery, will give 1 dose of Rocephin IV, check UA and blood cultures, continue to monitor  Continue statin,   optimize blood pressure control, continue to monitor          GI DVT prophylaxis: Patient will be on SCD               Please note that this dictation was completed with [employer], the [employer] voice recognition software.   Quite often unanticipated grammatical, syntax, homophones, and other interpretive errors are inadvertently transcribed by the computer software. Please disregard these errors. Please excuse any errors that have escaped final proofreading.          Signed By: Gina Dunlap MD     October 19, 2021

## 2021-10-21 VITALS
RESPIRATION RATE: 15 BRPM | OXYGEN SATURATION: 94 % | WEIGHT: 173.06 LBS | HEART RATE: 96 BPM | HEIGHT: 65 IN | BODY MASS INDEX: 28.83 KG/M2 | TEMPERATURE: 97.6 F | DIASTOLIC BLOOD PRESSURE: 60 MMHG | SYSTOLIC BLOOD PRESSURE: 106 MMHG

## 2021-10-21 LAB
ANION GAP SERPL CALC-SCNC: 5 MMOL/L (ref 5–15)
BASOPHILS # BLD: 0.1 K/UL (ref 0–0.1)
BASOPHILS NFR BLD: 1 % (ref 0–1)
BUN SERPL-MCNC: 19 MG/DL (ref 6–20)
BUN/CREAT SERPL: 23 (ref 12–20)
CALCIUM SERPL-MCNC: 9.3 MG/DL (ref 8.5–10.1)
CHLORIDE SERPL-SCNC: 109 MMOL/L (ref 97–108)
CO2 SERPL-SCNC: 26 MMOL/L (ref 21–32)
CREAT SERPL-MCNC: 0.81 MG/DL (ref 0.55–1.02)
DIFFERENTIAL METHOD BLD: ABNORMAL
EOSINOPHIL # BLD: 0.4 K/UL (ref 0–0.4)
EOSINOPHIL NFR BLD: 4 % (ref 0–7)
ERYTHROCYTE [DISTWIDTH] IN BLOOD BY AUTOMATED COUNT: 13.5 % (ref 11.5–14.5)
GLUCOSE SERPL-MCNC: 107 MG/DL (ref 65–100)
HCT VFR BLD AUTO: 31.9 % (ref 35–47)
HGB BLD-MCNC: 10.3 G/DL (ref 11.5–16)
IMM GRANULOCYTES # BLD AUTO: 0 K/UL (ref 0–0.04)
IMM GRANULOCYTES NFR BLD AUTO: 0 % (ref 0–0.5)
LYMPHOCYTES # BLD: 2.8 K/UL (ref 0.8–3.5)
LYMPHOCYTES NFR BLD: 29 % (ref 12–49)
MAGNESIUM SERPL-MCNC: 2 MG/DL (ref 1.6–2.4)
MCH RBC QN AUTO: 30.9 PG (ref 26–34)
MCHC RBC AUTO-ENTMCNC: 32.3 G/DL (ref 30–36.5)
MCV RBC AUTO: 95.8 FL (ref 80–99)
MONOCYTES # BLD: 0.7 K/UL (ref 0–1)
MONOCYTES NFR BLD: 7 % (ref 5–13)
NEUTS SEG # BLD: 5.7 K/UL (ref 1.8–8)
NEUTS SEG NFR BLD: 59 % (ref 32–75)
NRBC # BLD: 0 K/UL (ref 0–0.01)
NRBC BLD-RTO: 0 PER 100 WBC
PLATELET # BLD AUTO: 162 K/UL (ref 150–400)
PMV BLD AUTO: 10.9 FL (ref 8.9–12.9)
POTASSIUM SERPL-SCNC: 3.8 MMOL/L (ref 3.5–5.1)
PROCALCITONIN SERPL-MCNC: <0.05 NG/ML
RBC # BLD AUTO: 3.33 M/UL (ref 3.8–5.2)
SODIUM SERPL-SCNC: 140 MMOL/L (ref 136–145)
T4 FREE SERPL-MCNC: 1.5 NG/DL (ref 0.8–1.5)
TSH SERPL DL<=0.05 MIU/L-ACNC: 0.65 UIU/ML (ref 0.36–3.74)
WBC # BLD AUTO: 9.6 K/UL (ref 3.6–11)

## 2021-10-21 PROCEDURE — 97161 PT EVAL LOW COMPLEX 20 MIN: CPT

## 2021-10-21 PROCEDURE — 83735 ASSAY OF MAGNESIUM: CPT

## 2021-10-21 PROCEDURE — 97530 THERAPEUTIC ACTIVITIES: CPT

## 2021-10-21 PROCEDURE — 84439 ASSAY OF FREE THYROXINE: CPT

## 2021-10-21 PROCEDURE — 84145 PROCALCITONIN (PCT): CPT

## 2021-10-21 PROCEDURE — 85025 COMPLETE CBC W/AUTO DIFF WBC: CPT

## 2021-10-21 PROCEDURE — 80048 BASIC METABOLIC PNL TOTAL CA: CPT

## 2021-10-21 PROCEDURE — 74011250637 HC RX REV CODE- 250/637: Performed by: HOSPITALIST

## 2021-10-21 PROCEDURE — 74011250637 HC RX REV CODE- 250/637: Performed by: FAMILY MEDICINE

## 2021-10-21 PROCEDURE — 97116 GAIT TRAINING THERAPY: CPT

## 2021-10-21 PROCEDURE — 36415 COLL VENOUS BLD VENIPUNCTURE: CPT

## 2021-10-21 PROCEDURE — 84443 ASSAY THYROID STIM HORMONE: CPT

## 2021-10-21 RX ADMIN — ASPIRIN 81 MG: 81 TABLET, COATED ORAL at 09:26

## 2021-10-21 RX ADMIN — ACETAMINOPHEN 650 MG: 325 TABLET ORAL at 03:19

## 2021-10-21 RX ADMIN — PANTOPRAZOLE SODIUM 20 MG: 20 TABLET, DELAYED RELEASE ORAL at 09:26

## 2021-10-21 NOTE — PROGRESS NOTES
PHYSICAL THERAPY EVALUATION/DISCHARGE  Patient: Brandy Jordan (71 y.o. female)  Date: 10/21/2021  Primary Diagnosis: CVA (cerebral vascular accident) [facility]) [I63.9]       Precautions:     ASSESSMENT  Based on the objective data described below, the patient presents with overall mobility at/near baseline function s/p admission for syncope following procedure for uterine prolapse. At baseline, pt lives alone and mobilized independently with occasional use of SPC. Pt's sister (8 years younger than her), will be staying with her for the next 10 days to assist with mobility and ADLs as needed. This date, pt transferred supine>sit with SBA, sit<>stand with SBA, and ambulated with CGA. Gait was mildly unsteady, encouraged pt to use SPC at home and for sister to assist as needed. Pt reports she had no difficulty with stairs postop. Orthostatics obtained and listed below. Recommending HH PT once medically cleared for d/c. Pt has no further acute care PT needs, will sign off.           10/21/21 0848 10/21/21 0854 10/21/21 0856   BP: (!) 106/52 (!) 142/62 106/60   BP 1 Location: Left upper arm Left upper arm Left upper arm   BP Patient Position: Supine Sitting Standing   Pulse: 82 98 96        Functional Outcome Measure: The patient scored 70/100 on the Barthel outcome measure which is indicative of 30% impairment and dependence on others for basic mobility and self care tasks. Other factors to consider for discharge: supportive family, independent baseline, at/near baseline      Further skilled acute physical therapy is not indicated at this time.      PLAN :  Recommendation for discharge: (in order for the patient to meet his/her long term goals)  Physical therapy at least 2 days/week in the home AND ensure assist and/or supervision for safety with mobility and ADLs as needed    This discharge recommendation:  Has been made in collaboration with the attending provider and/or case management    IF patient discharges home will need the following DME: patient owns DME required for discharge       SUBJECTIVE:   Patient stated You have provided some helpful tips.     OBJECTIVE DATA SUMMARY:   HISTORY:    Past Medical History:   Diagnosis Date    Adverse effect of anesthesia 2005    took a long time to go to sleep with last colonoscopy    CAD (coronary artery disease) 2010    MI? 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    Peripheral neuropathy     BOTH FEET    Shingles 2018     Past Surgical History:   Procedure Laterality Date    COLONOSCOPY N/A 9/9/2016    [facility]  [facility]         HX BLADDER SUSPENSION  10/2021    HX CATARACT REMOVAL Bilateral     HX COLONOSCOPY      HX DILATION AND CURETTAGE  10/2021    HX ORTHOPAEDIC      HERNIATED [address] 2017    HX ORTHOPAEDIC         Prior level of function: lives alone, family lives locally. Sister will be staying with her for 10 days.   Was mobilizing independently with occasional use of SPC    Home Situation  Home Environment: Private residence  # Steps to Enter: 0  One/Two Story Residence:  (tri level)  Living Alone: Yes  Support Systems: Other Family Member(s), Child(maury) (sister is staying with her for 10 days)  Patient Expects to be Discharged to[de-identified] House  Current DME Used/Available at Home: Cane, straight  Tub or Shower Type: Tub/Shower combination    EXAMINATION/PRESENTATION/DECISION MAKING:   Critical Behavior:  Neurologic State: Alert, Appropriate for age, Eyes open spontaneously  Orientation Level: Oriented X4  Cognition: Appropriate decision making, Appropriate for age attention/concentration, Appropriate safety awareness, Follows commands  Safety/Judgement: Decreased awareness of need for assistance, Decreased awareness of need for safety  Hearing: Auditory  Auditory Impairment: Hearing aid(s)  Hearing Aids/Status: With patient    Range Of Motion:  AROM: Within functional limits           PROM: Within functional limits           Strength:    Strength: Generally decreased, functional                    Tone & Sensation:   Tone: Normal              Sensation: Impaired (baseline neuropathy )               Coordination:  Coordination: Within functional limits  Vision:      Functional Mobility:  Bed Mobility:  Supine to Sit: Stand-by assistance  Sit to Supine: Stand-by assistance  Scooting: Stand-by assistance    Transfers:  Sit to Stand: Stand-by assistance  Stand to Sit: Stand-by assistance    Balance:   Sitting: Intact  Standing: Impaired  Standing - Static: Good  Standing - Dynamic : Fair     Ambulation/Gait Training:  Distance (ft): 75 Feet (ft)  Assistive Device: Gait belt  Ambulation - Level of Assistance: Contact guard assistance  Gait Abnormalities: Decreased step clearance;Trunk sway increased  Base of Support: Narrowed  Speed/Venita: Slow;Shuffled  Step Length: Right shortened;Left shortened    Functional Measure:  Barthel Index:    Bathin  Bladder: 10  Bowels: 10  Groomin  Dressin  Feeding: 10  Mobility: 10  Stairs: 5  Toilet Use: 5  Transfer (Bed to Chair and Back): 10  Total: 70/100       The Barthel ADL Index: Guidelines  1. The index should be used as a record of what a patient does, not as a record of what a patient could do. 2. The main aim is to establish degree of independence from any help, physical or verbal, however minor and for whatever reason. 3. The need for supervision renders the patient not independent. 4. A patient's performance should be established using the best available evidence.  Asking the patient, friends/relatives and nurses are the usual sources, but direct observation and common sense are also important. However direct testing is not needed. 5. Usually the patient's performance over the preceding 24-48 hours is important, but occasionally longer periods will be relevant. 6. Middle categories imply that the patient supplies over 50 per cent of the effort. 7. Use of aids to be independent is allowed. Aldair Ray., Barthel, D.W. (1090). Functional evaluation: the Barthel Index. [facility] (14)2. ANDRA Stewart, Shraddha Oseguera., Uma Cabrera, [address] Measuring the change indisability after inpatient rehabilitation; comparison of the responsiveness of the Barthel Index and Functional Temperance Measure. Journal of Neurology, Neurosurgery, and Psychiatry, 66(4), 810-713. Aminata Mancilla, GETJBhavanaA, BOYD Cornelius, & Anirudh Mejia M.A. (2004.) Assessment of post-stroke quality of life in cost-effectiveness studies: The usefulness of the Barthel Index and the EuroQoL-5D. Quality of Life Research, 15, 141-82         Physical Therapy Evaluation Charge Determination   History Examination Presentation Decision-Making   HIGH Complexity :3+ comorbidities / personal factors will impact the outcome/ POC  HIGH Complexity : 4+ Standardized tests and measures addressing body structure, function, activity limitation and / or participation in recreation  LOW Complexity : Stable, uncomplicated  Other outcome measures Barthel  MEDIUM      Based on the above components, the patient evaluation is determined to be of the following complexity level: LOW     Activity Tolerance:   Good and Fair      After treatment patient left in no apparent distress:   Supine in bed, Call bell within reach and Caregiver / family present    COMMUNICATION/EDUCATION:   The patients plan of care was discussed with: Occupational therapist and Registered nurse.      Fall prevention education was provided and the patient/caregiver indicated understanding., Patient/family have participated as able in goal setting and plan of care. and Patient/family agree to work toward stated goals and plan of care.     Thank you for this referral.  Jalen Lagos, PT, DPT   Time Calculation: 26 mins

## 2021-10-21 NOTE — PROGRESS NOTES
Orthostatics:    Vitals:    10/21/21 0848 10/21/21 0854 10/21/21 0856   BP: (!) 106/52 (!) 142/62 106/60   BP 1 Location: Left upper arm Left upper arm Left upper arm   BP Patient Position: Supine Sitting Standing   Pulse: 82 98 96     Dino Ayers PT, DPT

## 2021-10-21 NOTE — DISCHARGE SUMMARY
Physician Discharge Summary     Patient ID:    Re Santos  [id number]  [unfilled]  1935    Admit date: 10/19/2021    Discharge date and time: 10/21/2021    Hospital Diagnoses and Treatment Rendered:     Patient had a bladder tack surgery done today by OB/GYN, did well, went home, at home patient felt dizzy and lightheaded, EMS was called, when they arrived patient had 2 witnessed syncopal events. It took a little while for her to get from upstairs to downstairs, she had some bradycardia, was given 0.5 mg of atropine, heart rate improved, she was brought to the hospital and was requested to be admitted to the hospitalist service. Currently the patient is resting in bed, reports that she still feels \"foggy in her head\" and weak but denies any other complaints or problems. I spoke with patient's son Maqrues Durham and he corroborated the story. Patient denies any other complaints or problems  Patient denies any complaints or problems today, eager to go home    Syncope/Bradycardia-seen by cardiology, suspect vasovagal from recent postop surgery, needed atropine by EMS PTA as heart rate was 44. Cardiac enzymes negative. D-dimer elevated but CT angio chest negative. Echocardiogram with . LV: Estimated LVEF is 55 - 60%. Normal cavity size, wall thickness and systolic function (ejection fraction normal). Wall motion: normal  , cardiology recommends no additional work-up at this point of time, outpatient follow-up, heart rate improved    Leukocytosis-resolved likely reactive, trended down to 14 K continue to monitor. Check pro Chiki.  Follow blood culture  Hyperlipidemia-continue home medications  Hypertension-continues to have some labile blood pressure, asymptomatic,   [place] colpoclesis and sling for Prolapse and incontinence    outpatient follow-up.   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 ICD-10-CM: M54.41, G89.29  ICD-9-CM: 724.2, 724.3, 338.29  6/5/2017 - Present        Displacement of lumbar intervertebral disc without myelopathy ICD-10-CM: M51.26  ICD-9-CM: 722.10  6/5/2017 - Present        Lumbosacral spondylosis without myelopathy ICD-10-CM: M47.817  ICD-9-CM: 721.3  6/5/2017 - Present        Right foot drop ICD-10-CM: M21.371  ICD-9-CM: 736.79  6/5/2017 - Present        Allergic conjunctivitis ICD-10-CM: H10.10  ICD-9-CM: 372.14  4/25/2016 - Present        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  ICD-9-CM: 371.57  2/3/2016 - Present        Vitreous floaters ICD-10-CM: O72.664  ICD-9-CM: 379.24  2/3/2016 - Present        Posterior capsular opacification ICD-10-CM: H26.499  ICD-9-CM: 366.50  2/3/2016 - Present        Primary open angle glaucoma ICD-10-CM: H40.1190  ICD-9-CM: 365.11, 365.70  2/3/2016 - Present        Hyperglycemia ICD-10-CM: R73.9  ICD-9-CM: 790.29  10/7/2011 - Present        TIA (transient ischemic attack) ICD-10-CM: G45.9  ICD-9-CM: 435.9  8/8/2011 - Present        Hyperlipidemia ICD-10-CM: E78.5  ICD-9-CM: 272.4  8/8/2011 - Present        EKG abnormalities ICD-10-CM: R94.31  ICD-9-CM: 794.31  8/8/2011 - Present        HTN (hypertension) ICD-10-CM: I10  ICD-9-CM: 401.9  4/1/2011 - Present        Vertigo ICD-10-CM: R42  ICD-9-CM: 780.4  4/1/2011 - Present        GERD (gastroesophageal reflux disease) ICD-10-CM: K21.9  ICD-9-CM: 530.81  4/1/2011 - Present        Hearing loss in left ear ICD-10-CM: H91.92  ICD-9-CM: 389.9  4/1/2011 - Present Discharge Medications:   Current Discharge Medication List      CONTINUE these medications which have NOT CHANGED    Details   ibuprofen (MOTRIN) 600 mg tablet Take 1 Tablet by mouth every six (6) hours as needed for Pain. Qty: 50 Tablet, Refills: 0  Start date: 10/19/2021      omeprazole (PriLOSEC OTC) 20 mg tablet Take 20 mg by mouth as needed. simvastatin (ZOCOR) 20 mg tablet TAKE 1 TABLET NIGHTLY  Qty: 90 Tab, Refills: 3    Associated Diagnoses: Mixed hyperlipidemia      cholecalciferol, vitamin D3, (VITAMIN D3) 2,000 unit tab Take 1 Tablet by mouth daily. timolol maleate 0.5 % DrpD ophthalmic solution Administer 1 Drop to both eyes daily. aspirin delayed-release (ASPIR-LOW) 81 mg tablet Take 81 mg by mouth daily. Indications: prevention for a blood clot going to the brain, prevention of transient ischemic attack         STOP taking these medications       HYDROcodone-acetaminophen (Norco) 5-325 mg per tablet Comments:   Reason for Stopping:         losartan (COZAAR) 100 mg tablet Comments:   Reason for Stopping:         sodium chloride (Walt 128) 5 % ophthalmic solution Comments:   Reason for Stopping: Follow up Care:    1. Charmaine Torres MD in 1-2 weeks. Please call to set up an appointment shortly after discharge. Diet:  Cardiac Diet    Disposition:  Home. Advanced Directive:   FULL x   DNR      Discharge Exam:  See today's note. CONSULTATIONS: Cardiology    Significant Diagnostic Studies:   10/19/2021: BUN 24 MG/DL* (Ref range: 6 - 20 MG/DL); Calcium 9.5 MG/DL (Ref range: 8.5 - 10.1 MG/DL); CO2 22 mmol/L (Ref range: 21 - 32 mmol/L); Creatinine 0.76 MG/DL (Ref range: 0.55 - 1.02 MG/DL); Glucose 187 mg/dL* (Ref range: 65 - 100 mg/dL); HCT 37.2 % (Ref range: 35.0 - 47.0 %); HGB 12.3 g/dL (Ref range: 11.5 - 16.0 g/dL); Potassium 4.2 mmol/L (Ref range: 3.5 - 5.1 mmol/L);  Sodium 139 mmol/L (Ref range: 136 - 145 mmol/L)  10/20/2021: BUN 22 MG/DL* (Ref range: 6 - 20 MG/DL); Calcium 8.4 MG/DL* (Ref range: 8.5 - 10.1 MG/DL); CO2 24 mmol/L (Ref range: 21 - 32 mmol/L); Creatinine 0.62 MG/DL (Ref range: 0.55 - 1.02 MG/DL); Glucose 126 mg/dL* (Ref range: 65 - 100 mg/dL); HCT 34.4 %* (Ref range: 35.0 - 47.0 %); HGB 11.1 g/dL* (Ref range: 11.5 - 16.0 g/dL); Potassium 4.4 mmol/L (Ref range: 3.5 - 5.1 mmol/L); Sodium 139 mmol/L (Ref range: 136 - 145 mmol/L)  Recent Labs     10/21/21  0335 10/20/21  0331   WBC 9.6 14.6*   HGB 10.3* 11.1*   HCT 31.9* 34.4*    161     Recent Labs     10/21/21  0335 10/20/21  0331 10/19/21  2134 10/19/21  1615    139  --  139   K 3.8 4.4  --  4.2   * 113*  --  110*   CO2 26 24  --  22   BUN 19 22*  --  24*   CREA 0.81 0.62  --  0.76   * 126*  --  187*   CA 9.3 8.4*  --  9.5   MG 2.0  --  2.0  --      Recent Labs     10/19/21  1615   AP 74   TP 6.2*   ALB 3.3*   GLOB 2.9     No results for input(s): INR, PTP, APTT, INREXT in the last 72 hours. No results for input(s): FE, TIBC, PSAT, FERR in the last 72 hours. No results for input(s): PH, PCO2, PO2 in the last 72 hours.   Recent Labs     10/20/21  0331 10/19/21  2134   CPK 83 113     No components found for: [place]    CDMP Checked:   Yes x       Signed:  Douglas Thorpe MD  10/21/2021  8:21 AM

## 2021-10-21 NOTE — PROGRESS NOTES
Transition of Care Plan   RUR- Low  7%   DISPOSITION: Home with assistance from sister + Home health pt ot   F/U with PCP/Specialist     Transport:     Transition of Care Plan: CM and PT discussed home health with patient, she is open to [facility], used previously 3+ years ago. Referral sent in [employer]. The Plan for Transition of Care is related to the following treatment goals: Home health PT/OT    The Patient  was provided with a choice of provider and agrees  with the discharge plan. Yes [x] No []    A Freedom of choice list was provided with basic dialogue that supports the patient's individualized plan of care/goals and shares the quality data associated with the providers. Yes [x] No []    Rosemarie Dangelo, M.S.W.

## 2021-10-21 NOTE — PROGRESS NOTES
Spiritual Care Partner Volunteer visited patient in [place] [address] on 10/21/21. Documented by:   Chaplain Campoverde MDiv, MACE  [phone] PRAY ([phone])

## 2021-10-21 NOTE — DISCHARGE INSTRUCTIONS
Patient to monitor blood pressure at home 3 times a day, be cognizant that patient has blood pressure on the lower side, avoid getting up from the bed from a sitting position abruptly, seek immediate medical attention for lightheadedness dizziness or syncope/passing out    Patient return to ED or seek immediate medical attention for headache, blurry vision, sore throat, trouble swallowing, trouble with speech, chest pain, shortness of breath, cough, fever, chills, nausea, vomiting, diarrhea, falls, injuries, lightheadedness, dizziness, syncope, passing out, blackout, abdominal pain, constipation, diarrhea, blood in stool, blood in urine, blood in sputum, blood in vomitus, generalized or focal weakness or any other acute concerns or problems

## 2021-10-22 ENCOUNTER — TELEPHONE (OUTPATIENT)
Dept: INTERNAL MEDICINE CLINIC | Age: 86
End: 2021-10-22

## 2021-10-22 NOTE — ROUTINE PROCESS
I have reviewed discharge instructions with the patient. The patient verbalized understanding. Discharge medications reviewed with patient and appropriate educational materials and side effects teaching were provided. Pt. Discharged home via family with belongings.

## 2021-10-22 NOTE — TELEPHONE ENCOUNTER
----- Message from Caitlyn Scottie sent at 10/22/2021  2:57 PM EDT -----  Subject: Appointment Request    Reason for Call: Routine Hospital Follow Up    QUESTIONS  Type of Appointment? Established Patient  Reason for appointment request? No appointments available during search  Additional Information for Provider? Patient needs follow up appointment   from Hospital discharge in 7 days. There isn't any appointments available. Please contact patient to advise. [phone]  ---------------------------------------------------------------------------  --------------  Grisel BOTELLO  What is the best way for the office to contact you? OK to leave message on   voicemail  Preferred Call Back Phone Number? [phone]  ---------------------------------------------------------------------------  --------------  SCRIPT ANSWERS  Relationship to Patient? Self  (Patient requests to see provider urgently. )? No  (Has the patient been discharged from the hospital within 2 business days   AND does not have a Telephone Encounter  Follow Up From [address] [place] [address]   documented in [address]?)? No  Do you have any questions for your primary care provider that need to be   answered prior to your appointment? (Use RN Triage if question pertains to   anything on the red flag list)? No  (Patient needs follow up visit after hospital discharge) Book first   available appointment within 7 days OF DISCHARGE, if no appt, proceed to   book the next available time slot within 14 days OF DISCHARGE AND Send   Message to Provider. [facility] Follow Up appointment cannot be booked   beyond 14 Days and should result in a Message to Provider. ? Yes   Have you been diagnosed with, awaiting test results for, or told that you   are suspected of having COVID-19 (Coronavirus)? (If patient has tested   negative or was tested as a requirement for work, school, or travel and   not based on symptoms, answer no)?  No  Within the past two weeks have you developed any of the following symptoms   (answer no if symptoms have been present longer than 2 weeks or began   more than 2 weeks ago)? Fever or Chills, Cough, Shortness of breath or   difficulty breathing, Loss of taste or smell, Sore throat, Nasal   congestion, Sneezing or runny nose, Fatigue or generalized body aches   (answer no if pain is specific to a body part e.g. back pain), Diarrhea,   Headache? No  Have you had close contact with someone with COVID-19 in the last 14 days? No  (Service Expert  click yes below to proceed with [employer] As Usual   Scheduling)?  Yes

## 2021-10-22 NOTE — PROGRESS NOTES
Problem: Falls - Risk of  Goal: *Absence of Falls  Description: Document Leafy Mcdaniel Fall Risk and appropriate interventions in the flowsheet.   Outcome: Resolved/Met  Note: Fall Risk Interventions:  Mobility Interventions: Communicate number of staff needed for ambulation/transfer, Patient to call before getting OOB, PT Consult for mobility concerns              Elimination Interventions: Call light in reach, Patient to call for help with toileting needs, Toileting schedule/hourly rounds              Problem: Patient Education: Go to Patient Education Activity  Goal: Patient/Family Education  Outcome: Resolved/Met     Problem: Patient Education: Go to Patient Education Activity  Goal: Patient/Family Education  Outcome: Resolved/Met

## 2021-10-24 LAB
ATRIAL RATE: 83 BPM
BACTERIA SPEC CULT: NORMAL
CALCULATED P AXIS, ECG09: 50 DEGREES
CALCULATED R AXIS, ECG10: -8 DEGREES
CALCULATED T AXIS, ECG11: 26 DEGREES
DIAGNOSIS, 93000: NORMAL
P-R INTERVAL, ECG05: 134 MS
Q-T INTERVAL, ECG07: 408 MS
QRS DURATION, ECG06: 82 MS
QTC CALCULATION (BEZET), ECG08: 479 MS
SERVICE CMNT-IMP: NORMAL
VENTRICULAR RATE, ECG03: 83 BPM

## 2021-10-28 ENCOUNTER — TELEPHONE (OUTPATIENT)
Dept: CARDIOLOGY CLINIC | Age: 86
End: 2021-10-28

## 2021-10-28 ENCOUNTER — VIRTUAL VISIT (OUTPATIENT)
Dept: INTERNAL MEDICINE CLINIC | Age: 86
End: 2021-10-28
Payer: MEDICARE

## 2021-10-28 DIAGNOSIS — I10 ESSENTIAL HYPERTENSION: ICD-10-CM

## 2021-10-28 DIAGNOSIS — Z09 HOSPITAL DISCHARGE FOLLOW-UP: Primary | ICD-10-CM

## 2021-10-28 PROCEDURE — 99496 TRANSJ CARE MGMT HIGH F2F 7D: CPT | Performed by: FAMILY MEDICINE

## 2021-10-28 PROCEDURE — G8427 DOCREV CUR MEDS BY ELIG CLIN: HCPCS | Performed by: FAMILY MEDICINE

## 2021-10-28 NOTE — TELEPHONE ENCOUNTER
TC to pt, ID verified. Spoke with SISter, as pt is hard of hearing. Advised no available upcoming appts with Dr. Crystal Akers, made follow up with KRISTY Lovell. No further questions.

## 2021-10-28 NOTE — PROGRESS NOTES
Jean Claude Zavala is a 81 y.o. female who was seen by synchronous (real-time) audio and video technology on 10/28/2021 for Hospital Follow Up (Surgery prolasped injury, syncope issues) and Medication Evaluation        Assessment & Plan:   Diagnoses and all orders for this visit:    1. Hospital discharge follow-up    2. Essential hypertension       This patient has been stable since returning home from the hospital.  Blood pressures have fluctuated since she has been taken off of her medication  Pt advised to continue to monitor her BP over the weekend and if she is continuing to run in the 140-150's while sitting and standing then she can begin taking a half a tablet of her Losartan. I will send a note to Dr. Evan Roque about today's visit as well. I spent at least 40 minutes on this visit with this established patient. Subjective:     Patient presents today virtually for a hospital follow-up. Hospital Discharge follow-up: Pt had a syncopal episode after having her bladder tack surgery. Pt's sister remarks her sister was barely inside the home before th episode of syncope occurred. She notes going over to her sister searching for a pulse so she called EMS d/t being unable to find a pulse. Pt reports being admitted to the hospital where she had an abundance of tests run on her. She notes being told her episode of syncope was related to the anesthesia. Pt indicates being told she was to take any of her medications. She states having an echocardiogram while in the hospital and has been advised to move forward with a follow-up by a cardiologist. Pt requests a referral to a cardiologist in th [address] [facility] area. HTN: Pt's sister has been tracking her BP since returning from the hospital. She notes her BP after being released form the hospital was elevated at 138/74 and 166/80. Pt's sister indicates her BP has since normalized to 110-125/70-85. She reports her sister was running a fever of 101.8 on 10/25/21. Pt's sister remarks going to Dr. Carola Ingram on 10/26 who prescribed amoxicillin and to go on tylenol to help with the fever. Pt notes her fever has been broken and is responding well to the amoxicillin. She states her BP has fluctuated over the last three days between 148-166/80-88. Pt's BP this morning has dropped down to 138/80. Pt remarks feeling upset d/t being unable to control her bladder. She notes being able to better control her bladder during the day but at night she is having more difficulty. Prior to Admission medications    Medication Sig Start Date End Date Taking? Authorizing Provider   ibuprofen (MOTRIN) 600 mg tablet Take 1 Tablet by mouth every six (6) hours as needed for Pain. 10/19/21   Alonso Casper MD   omeprazole (PriLOSEC OTC) 20 mg tablet Take 20 mg by mouth as needed. Provider, Historical   simvastatin (ZOCOR) 20 mg tablet TAKE 1 TABLET NIGHTLY 6/9/20   Eunice Enriquez MD   cholecalciferol, vitamin D3, (VITAMIN D3) 2,000 unit tab Take 1 Tablet by mouth daily. Provider, Historical   timolol maleate 0.5 % DrpD ophthalmic solution Administer 1 Drop to both eyes daily. Provider, Historical   aspirin delayed-release (ASPIR-LOW) 81 mg tablet Take 81 mg by mouth daily.  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 06/05/2017    Displacement of lumbar intervertebral disc without myelopathy 06/05/2017    Lumbosacral spondylosis without myelopathy 06/05/2017    Right foot drop 06/05/2017    Allergic conjunctivitis 04/25/2016    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 50 Tablet 0    omeprazole (PriLOSEC OTC) 20 mg tablet Take 20 mg by mouth as needed.  simvastatin (ZOCOR) 20 mg tablet TAKE 1 TABLET NIGHTLY 90 Tab 3    cholecalciferol, vitamin D3, (VITAMIN D3) 2,000 unit tab Take 1 Tablet by mouth daily.  timolol maleate 0.5 % DrpD ophthalmic solution Administer 1 Drop to both eyes daily.  aspirin delayed-release (ASPIR-LOW) 81 mg tablet Take 81 mg by mouth daily. Indications: prevention for a blood clot going to the brain, prevention of transient ischemic attack       Allergies   Allergen Reactions    Alendronate Nausea Only     Other reaction(s): Abdominal Pain    Sulfamethoxazole-Trimethoprim Other (comments)     thrush     Past Medical History:   Diagnosis Date    Adverse effect of anesthesia 2005    took a long time to go to sleep with last colonoscopy    CAD (coronary artery disease) 2010    MI?  Chronic pain     hip rt    Fuchs' corneal dystrophy     Gastrointestinal disorder     GERD    GERD (gastroesophageal reflux disease)     Glaucoma     High cholesterol     Hypertension     Ill-defined condition     osteopenia    Ill-defined condition     pulled shoulder lt.  and elbow replacement left    Peripheral neuropathy     BOTH FEET    Shingles 2018     Past Surgical History:   Procedure Laterality Date    COLONOSCOPY N/A 9/9/2016    COLON[facility]  9/9/2016         HX BLADDER SUSPENSION  10/2021    HX CATARACT REMOVAL Bilateral     HX COLONOSCOPY      HX DILATION AND CURETTAGE  10/2021    HX ORTHOPAEDIC      HERNIATED [address] 2017    HX [de-identified]       Family History   Problem Relation Age of Onset    Hypertension Mother     Atrial Fibrillation Mother     Cancer Father     Atrial Fibrillation Sister     Other Brother         detached retina    Anesth Problems Neg Hx      Social History     Tobacco Use    Smoking status: Former Smoker     Packs/day: 0.50     Years: 2.00     Pack years: 1.00     Quit date: 1959     Years since quittin.8    Smokeless tobacco: Never Used   Substance Use Topics    Alcohol use: Yes     Comment: rare       Review of Systems   Constitutional: Negative. HENT: Negative. Eyes: Negative. Respiratory: Negative. Cardiovascular: Negative. Gastrointestinal: Negative. Genitourinary: Negative. Musculoskeletal: Negative. Skin: Negative. Neurological: Negative. Endo/Heme/Allergies: Negative. Psychiatric/Behavioral: Negative.         Objective:     Patient-Reported Vitals 10/28/2021   Patient-Reported Pulse 81   Patient-Reported Temperature 98.8   Patient-Reported Systolic  387   Patient-Reported Diastolic 88        [INSTRUCTIONS:  \"[x]\" Indicates a positive item  \"[]\" Indicates a negative item  -- DELETE ALL ITEMS NOT EXAMINED]    Constitutional: [x] Appears well-developed and well-nourished [x] No apparent distress      [] Abnormal -     Mental status: [x] Alert and awake  [x] Oriented to person/place/time [x] Able to follow commands    [] Abnormal -     Eyes:   EOM    [x]  Normal    [] Abnormal -   Sclera  [x]  Normal    [] Abnormal -          Discharge [x]  None visible   [] Abnormal -     HENT: [x] Normocephalic, atraumatic  [] Abnormal -   [x] Mouth/Throat: Mucous membranes are moist    External Ears [x] Normal  [] Abnormal -    Neck: [x] No visualized mass [] Abnormal -     Pulmonary/Chest: [x] Respiratory effort normal   [x] No visualized signs of difficulty breathing or respiratory distress        [] Abnormal -      Musculoskeletal:   [x] Normal gait with no signs of ataxia         [x] Normal range of motion of neck        [] Abnormal -     Neurological:        [x] No Facial Asymmetry (Cranial nerve 7 motor function) (limited exam due to video visit)          [x] No gaze palsy        [] Abnormal -          Skin:        [x] No significant exanthematous lesions or discoloration noted on facial skin         [] Abnormal -            Psychiatric:       [x] Normal Affect [] Abnormal -        [x] No Hallucinations    Other pertinent observable physical exam findings:-        We discussed the expected course, resolution and complications of the diagnosis(es) in detail. Medication risks, benefits, costs, interactions, and alternatives were discussed as indicated. I advised her to contact the office if her condition worsens, changes or fails to improve as anticipated. She expressed understanding with the diagnosis(es) and plan. Courtney Trinidad, was evaluated through a synchronous (real-time) audio-video encounter. The patient (or guardian if applicable) is aware that this is a billable service. Verbal consent to proceed has been obtained within the past 12 months. The visit was conducted pursuant to the emergency declaration under the [address] [place] [address] authority and the [employer] General Act. Patient identification was verified, and a caregiver was present when appropriate. The patient was located in a state where the provider was credentialed to provide care.       MD Kiko Garnica, as dictated by Nitish Winters MD.

## 2021-10-28 NOTE — TELEPHONE ENCOUNTER
Patient is trying to get an appointment with Bessy Welch for a f/u from the hospital need an appointment within a weeks time. Please give a call to set her up.   Phone [phone] Sister Faisal carlisle

## 2021-10-28 NOTE — PATIENT INSTRUCTIONS
Bradford Case is a 81 y.o. female  Had GYN Surgery and was admitted in Hospital for Syncope. BP medications were stopped during hospital stay. Being treated for a UTI with Amoxicillin.

## 2021-10-29 ENCOUNTER — TELEPHONE (OUTPATIENT)
Dept: INTERNAL MEDICINE CLINIC | Age: 86
End: 2021-10-29

## 2021-10-29 ENCOUNTER — PATIENT MESSAGE (OUTPATIENT)
Dept: INTERNAL MEDICINE CLINIC | Age: 86
End: 2021-10-29

## 2021-10-29 NOTE — TELEPHONE ENCOUNTER
Called, spoke with Pt. Two pt identifiers confirmed. Pt informed per Dr. Saadia Kramer to get intouch with Dr. Carolyn Sagastume because the reaction is probably from the abx. Pt stated she called them too and waiting for a call back on further steps. Pt verbalized understanding of information discussed w/ no further questions at this time.

## 2021-10-29 NOTE — TELEPHONE ENCOUNTER
States itching and rash on torso and all up the back, like a solid mass of red, then looks \"hivey like cottage cheese\" , started last night, and at 3 am pt itching terribly. States looks similar to shingles. States tried cortisone cream. States cream helped with itch. No other otc meds taken. States had fever the other day and surgeon did urine sample, given antibiotic on Monday for uti. States please call to advise. States will try to upload images on [employer] if able.     Please use: [phone]

## 2021-11-04 ENCOUNTER — TELEPHONE (OUTPATIENT)
Dept: CARDIOLOGY CLINIC | Age: 86
End: 2021-11-04

## 2021-11-04 ENCOUNTER — OFFICE VISIT (OUTPATIENT)
Dept: CARDIOLOGY CLINIC | Age: 86
End: 2021-11-04
Payer: MEDICARE

## 2021-11-04 VITALS
SYSTOLIC BLOOD PRESSURE: 130 MMHG | BODY MASS INDEX: 28.49 KG/M2 | HEIGHT: 65 IN | HEART RATE: 71 BPM | RESPIRATION RATE: 18 BRPM | OXYGEN SATURATION: 98 % | DIASTOLIC BLOOD PRESSURE: 70 MMHG | WEIGHT: 171 LBS

## 2021-11-04 DIAGNOSIS — R00.1 BRADYCARDIA: ICD-10-CM

## 2021-11-04 DIAGNOSIS — R94.31 ABNORMAL ECG: ICD-10-CM

## 2021-11-04 DIAGNOSIS — R55 SYNCOPE, UNSPECIFIED SYNCOPE TYPE: ICD-10-CM

## 2021-11-04 DIAGNOSIS — I10 HTN (HYPERTENSION), BENIGN: Primary | ICD-10-CM

## 2021-11-04 PROCEDURE — 1111F DSCHRG MED/CURRENT MED MERGE: CPT | Performed by: NURSE PRACTITIONER

## 2021-11-04 PROCEDURE — 1090F PRES/ABSN URINE INCON ASSESS: CPT | Performed by: NURSE PRACTITIONER

## 2021-11-04 PROCEDURE — 1101F PT FALLS ASSESS-DOCD LE1/YR: CPT | Performed by: NURSE PRACTITIONER

## 2021-11-04 PROCEDURE — 99214 OFFICE O/P EST MOD 30 MIN: CPT | Performed by: NURSE PRACTITIONER

## 2021-11-04 PROCEDURE — G8400 PT W/DXA NO RESULTS DOC: HCPCS | Performed by: NURSE PRACTITIONER

## 2021-11-04 PROCEDURE — G8417 CALC BMI ABV UP PARAM F/U: HCPCS | Performed by: NURSE PRACTITIONER

## 2021-11-04 PROCEDURE — G8432 DEP SCR NOT DOC, RNG: HCPCS | Performed by: NURSE PRACTITIONER

## 2021-11-04 PROCEDURE — G8427 DOCREV CUR MEDS BY ELIG CLIN: HCPCS | Performed by: NURSE PRACTITIONER

## 2021-11-04 PROCEDURE — G8536 NO DOC ELDER MAL SCRN: HCPCS | Performed by: NURSE PRACTITIONER

## 2021-11-04 PROCEDURE — G8754 DIAS BP LESS 90: HCPCS | Performed by: NURSE PRACTITIONER

## 2021-11-04 PROCEDURE — G0463 HOSPITAL OUTPT CLINIC VISIT: HCPCS | Performed by: NURSE PRACTITIONER

## 2021-11-04 PROCEDURE — G8752 SYS BP LESS 140: HCPCS | Performed by: NURSE PRACTITIONER

## 2021-11-04 RX ORDER — LOSARTAN POTASSIUM 50 MG/1
50 TABLET ORAL DAILY
COMMUNITY
End: 2021-12-21

## 2021-11-04 NOTE — TELEPHONE ENCOUNTER
----- Message from Scott Juares sent at 2021 12:29 PM EDT -----  Regardin wk loop  Per Rick Reyna two week loop for bradycardia, syncope.      Thank you,     Jose Abbasi

## 2021-11-04 NOTE — PROGRESS NOTES
HPI: Sandie Hendircks, a 81y.o. year-old who presents for follow up related to hospital admission for syncope. She had a bladder tack surgery done by OB/GYN, did well, went home, at home patient felt dizzy and lightheaded, EMS was called, when they arrived patient had 2 witnessed syncopal events.  It took a little while for her to get from upstairs to downstairs, she had some bradycardia, was given atropine, heart rate improved, she was brought to the hospital.  She was also noted to have some hypotension and her losartan was held and then decreased on discharge  Echo was ok  ECG showed inferior Q waves which were chronic but the patient reports today that she had stress test after her ECG was abnormal in 2011 and her stress test was normal  Discussed her normal echo today as well - normal EF, no wall motion abnormalities  Reviewed home BP readings which are labile - -150mmHg, most readings are in a normal range  Slightly orthostatic today - lying 150/80, sitting 142/80, standing 130/70  She is trying to remain hydrated as instructed  No chest pain or palpitations  No dyspnea with exertion   No LE edema     Assessment/Plan:  1. Syncope              - suspect related to post op anesthesia, had bradycardia and hypotension (BP by EMS on arrival 108/44)              - HR 44 on EMS arrival.  Atropine 0.5 x 2 given. HR improved. Possibly vasovagal after anesthesia. - no ischemic changes on ECG, Hs Trop not significant, TTE normal   - BP is slightly labile currently on losartan 50mg daily so I asked her to check BP BID and send me readings in 2 weeks to review   - will order 2 week loop monitor to assess for significant bradycardia, arrhythmias, heart block, etc    2. Bradycardia    -will order 2 week loop monitor to assess as above  3. HTN              - continue losartan 50mg daily for now   - continue to monitor BP and sending readings for review in 2 weeks as above  4.   Abnormal ECG   - chronic inferior Q waves on ECG, patient reports normal stress test after being told she had abnormal ECG in the past   - denies any exertional symptoms, no chest pain or dyspnea, no increase in fatigue   - offered to order stress testing to look for ischemia but she and her son agreed that they didn't feel it was necessary right now in the absence of symptoms  5. Dyslipidemia   - on simvastatin per PCP     ECHO ADULT COMPLETE 10/20/2021     Interpretation Summary  · LV: Estimated LVEF is 55 - 60%. Normal cavity size, wall thickness and systolic function (ejection fraction normal). Wall motion: normal.  · MV: Mild mitral annular calcification. · Echo study was technically difficulty. Signed by: Stephon Gudino MD on 10/20/2021  1:02 PM    FmHx: family history includes Atrial Fibrillation in her mother and sister; Cancer in her father; Hypertension in her mother; Other in her brother. Social Hx :  reports that she quit smoking about 62 years ago. She has a 1.00 pack-year smoking history. She has never used smokeless tobacco. She reports current alcohol use. She reports that she does not use drugs. She  has a past medical history of Adverse effect of anesthesia (2005), CAD (coronary artery disease) (2010), Chronic pain, Fuchs' corneal dystrophy, Gastrointestinal disorder, GERD (gastroesophageal reflux disease), Glaucoma, High cholesterol, Hypertension, Ill-defined condition, Ill-defined condition, Peripheral neuropathy, and Shingles (2018). Cardiovascular ROS: no chest pain or dyspnea on exertion  Respiratory ROS: no cough, shortness of breath, or wheezing  Neurological ROS: no TIA or stroke symptoms  All other systems negative except as above. PE  Vitals:    11/04/21 1140 11/04/21 1150   BP: (!) 150/80 130/70   Pulse: 71    Resp: 18    SpO2: 98%    Weight: 171 lb (77.6 kg)    Height: 5' 5\" (1.651 m)     Body mass index is 28.46 kg/m².    General appearance - alert, well appearing, and in no distress  Mental status - affect appropriate to mood  Eyes - sclera anicteric, moist mucous membranes  Neck - supple  Lymphatics - not assessed  Chest - clear to auscultation, no wheezes, rales or rhonchi  Heart - normal rate, regular rhythm, normal S1, S2, no murmurs, rubs, clicks or gallops  Abdomen - soft, nontender, nondistended  Back exam - full range of motion, no tenderness  Neurological - cranial nerves II through XII grossly intact, no focal deficit  Musculoskeletal - no muscular tenderness noted, normal strength  Extremities - peripheral pulses normal, no pedal edema  Skin - normal coloration  no rashes    12 lead ECG:    Recent Labs:  Lab Results   Component Value Date/Time    Cholesterol, total 145 04/23/2021 08:28 AM    HDL Cholesterol 44 04/23/2021 08:28 AM    LDL, calculated 77 04/23/2021 08:28 AM    LDL, calculated 99 02/14/2020 09:17 AM    Triglyceride 133 04/23/2021 08:28 AM     Lab Results   Component Value Date/Time    Creatinine 0.81 10/21/2021 03:35 AM     Lab Results   Component Value Date/Time    BUN 19 10/21/2021 03:35 AM     Lab Results   Component Value Date/Time    Potassium 3.8 10/21/2021 03:35 AM     Lab Results   Component Value Date/Time    Hemoglobin A1c 6.1 (H) 04/23/2021 08:28 AM     Lab Results   Component Value Date/Time    HGB 10.3 (L) 10/21/2021 03:35 AM     Lab Results   Component Value Date/Time    PLATELET 444 06/42/8697 03:35 AM       Reviewed:  Past Medical History:   Diagnosis Date    Adverse effect of anesthesia 2005    took a long time to go to sleep with last colonoscopy    CAD (coronary artery disease) 2010    MI?  Chronic pain     hip rt    Fuchs' corneal dystrophy     Gastrointestinal disorder     GERD    GERD (gastroesophageal reflux disease)     Glaucoma     High cholesterol     Hypertension     Ill-defined condition     osteopenia    Ill-defined condition     pulled shoulder lt.  and elbow replacement left    Peripheral neuropathy     BOTH FEET    Shingles      Social History     Tobacco Use   Smoking Status Former Smoker    Packs/day: 0.50    Years: 2.00    Pack years: 1.00    Quit date: 1959    Years since quittin.8   Smokeless Tobacco Never Used     Social History     Substance and Sexual Activity   Alcohol Use Yes    Comment: rare     Allergies   Allergen Reactions    Alendronate Nausea Only     Other reaction(s): Abdominal Pain    Sulfamethoxazole-Trimethoprim Other (comments)     thrush       Current Outpatient Medications   Medication Sig    losartan (COZAAR) 50 mg tablet Take 50 mg by mouth daily.  simvastatin (ZOCOR) 20 mg tablet TAKE 1 TABLET NIGHTLY    timolol maleate 0.5 % DrpD ophthalmic solution Administer 1 Drop to both eyes daily. No current facility-administered medications for this visit.        Carol Bianchi, MIKAP, [address] [facility]  [facility]  [address], [address] [place] [address],[address]  [address]  () [phone] (k) [phone]

## 2021-11-04 NOTE — PATIENT INSTRUCTIONS
Please check your blood pressure twice daily - first check at least one hour after your losartan, second check in the late afternoon or early evening    Please send me your readings over [facility] in 2 weeks    Please make sure to drink 4-6 glasses of water daily     Please return your heart monitor two weeks after wearing it

## 2021-11-04 NOTE — TELEPHONE ENCOUNTER
Called, no answer left message to call office back regarding her allergic reaction and make sure she is ok.

## 2021-12-20 DIAGNOSIS — E78.2 MIXED HYPERLIPIDEMIA: ICD-10-CM

## 2021-12-21 RX ORDER — LOSARTAN POTASSIUM 50 MG/1
TABLET ORAL
Qty: 90 TABLET | Refills: 3 | Status: SHIPPED | OUTPATIENT
Start: 2021-12-21

## 2021-12-21 RX ORDER — SIMVASTATIN 20 MG/1
20 TABLET, FILM COATED ORAL
Qty: 90 TABLET | Refills: 3 | Status: SHIPPED | OUTPATIENT
Start: 2021-12-21

## 2022-03-18 PROBLEM — N81.2 INCOMPLETE UTEROVAGINAL PROLAPSE: Status: ACTIVE | Noted: 2021-10-19

## 2022-03-18 PROBLEM — M47.817 LUMBOSACRAL SPONDYLOSIS WITHOUT MYELOPATHY: Status: ACTIVE | Noted: 2017-06-05

## 2022-03-19 PROBLEM — M21.371 RIGHT FOOT DROP: Status: ACTIVE | Noted: 2017-06-05

## 2022-03-19 PROBLEM — M54.41 CHRONIC LOW BACK PAIN WITH RIGHT-SIDED SCIATICA: Status: ACTIVE | Noted: 2017-06-05

## 2022-03-19 PROBLEM — G89.29 CHRONIC LOW BACK PAIN WITH RIGHT-SIDED SCIATICA: Status: ACTIVE | Noted: 2017-06-05

## 2022-03-19 PROBLEM — I63.9 CVA (CEREBRAL VASCULAR ACCIDENT) (HCC): Status: ACTIVE | Noted: 2021-10-19

## 2022-03-19 PROBLEM — M51.26 DISPLACEMENT OF LUMBAR INTERVERTEBRAL DISC WITHOUT MYELOPATHY: Status: ACTIVE | Noted: 2017-06-05

## 2022-05-25 ENCOUNTER — OFFICE VISIT (OUTPATIENT)
Dept: INTERNAL MEDICINE CLINIC | Age: 87
End: 2022-05-25
Payer: MEDICARE

## 2022-05-25 VITALS
HEART RATE: 75 BPM | WEIGHT: 171.6 LBS | HEIGHT: 65 IN | BODY MASS INDEX: 28.59 KG/M2 | SYSTOLIC BLOOD PRESSURE: 153 MMHG | TEMPERATURE: 98.1 F | RESPIRATION RATE: 16 BRPM | DIASTOLIC BLOOD PRESSURE: 80 MMHG | OXYGEN SATURATION: 98 %

## 2022-05-25 DIAGNOSIS — Z00.00 MEDICARE ANNUAL WELLNESS VISIT, SUBSEQUENT: Primary | ICD-10-CM

## 2022-05-25 DIAGNOSIS — R73.09 ELEVATED HEMOGLOBIN A1C: ICD-10-CM

## 2022-05-25 DIAGNOSIS — E78.2 MIXED HYPERLIPIDEMIA: ICD-10-CM

## 2022-05-25 DIAGNOSIS — I10 PRIMARY HYPERTENSION: ICD-10-CM

## 2022-05-25 DIAGNOSIS — Z13.6 SCREENING FOR ISCHEMIC HEART DISEASE: ICD-10-CM

## 2022-05-25 LAB
ALBUMIN SERPL-MCNC: 3.8 G/DL (ref 3.5–5)
ALBUMIN/GLOB SERPL: 1.3 {RATIO} (ref 1.1–2.2)
ALP SERPL-CCNC: 95 U/L (ref 45–117)
ALT SERPL-CCNC: 19 U/L (ref 12–78)
ANION GAP SERPL CALC-SCNC: 2 MMOL/L (ref 5–15)
AST SERPL-CCNC: 19 U/L (ref 15–37)
BASOPHILS # BLD: 0 K/UL (ref 0–0.1)
BASOPHILS NFR BLD: 1 % (ref 0–1)
BILIRUB SERPL-MCNC: 0.5 MG/DL (ref 0.2–1)
BUN SERPL-MCNC: 24 MG/DL (ref 6–20)
BUN/CREAT SERPL: 30 (ref 12–20)
CALCIUM SERPL-MCNC: 9.4 MG/DL (ref 8.5–10.1)
CHLORIDE SERPL-SCNC: 109 MMOL/L (ref 97–108)
CHOLEST SERPL-MCNC: 152 MG/DL
CO2 SERPL-SCNC: 30 MMOL/L (ref 21–32)
CREAT SERPL-MCNC: 0.81 MG/DL (ref 0.55–1.02)
DIFFERENTIAL METHOD BLD: NORMAL
EOSINOPHIL # BLD: 0.2 K/UL (ref 0–0.4)
EOSINOPHIL NFR BLD: 2 % (ref 0–7)
ERYTHROCYTE [DISTWIDTH] IN BLOOD BY AUTOMATED COUNT: 12.5 % (ref 11.5–14.5)
EST. AVERAGE GLUCOSE BLD GHB EST-MCNC: 131 MG/DL
GLOBULIN SER CALC-MCNC: 3 G/DL (ref 2–4)
GLUCOSE SERPL-MCNC: 115 MG/DL (ref 65–100)
HBA1C MFR BLD: 6.2 % (ref 4–5.6)
HCT VFR BLD AUTO: 43.8 % (ref 35–47)
HDLC SERPL-MCNC: 45 MG/DL
HDLC SERPL: 3.4 {RATIO} (ref 0–5)
HGB BLD-MCNC: 14.1 G/DL (ref 11.5–16)
IMM GRANULOCYTES # BLD AUTO: 0 K/UL (ref 0–0.04)
IMM GRANULOCYTES NFR BLD AUTO: 0 % (ref 0–0.5)
LDLC SERPL CALC-MCNC: 88 MG/DL (ref 0–100)
LYMPHOCYTES # BLD: 2.3 K/UL (ref 0.8–3.5)
LYMPHOCYTES NFR BLD: 36 % (ref 12–49)
MCH RBC QN AUTO: 30.6 PG (ref 26–34)
MCHC RBC AUTO-ENTMCNC: 32.2 G/DL (ref 30–36.5)
MCV RBC AUTO: 95 FL (ref 80–99)
MONOCYTES # BLD: 0.5 K/UL (ref 0–1)
MONOCYTES NFR BLD: 7 % (ref 5–13)
NEUTS SEG # BLD: 3.5 K/UL (ref 1.8–8)
NEUTS SEG NFR BLD: 54 % (ref 32–75)
NRBC # BLD: 0 K/UL (ref 0–0.01)
NRBC BLD-RTO: 0 PER 100 WBC
PLATELET # BLD AUTO: 187 K/UL (ref 150–400)
PMV BLD AUTO: 10.8 FL (ref 8.9–12.9)
POTASSIUM SERPL-SCNC: 4.9 MMOL/L (ref 3.5–5.1)
PROT SERPL-MCNC: 6.8 G/DL (ref 6.4–8.2)
RBC # BLD AUTO: 4.61 M/UL (ref 3.8–5.2)
SODIUM SERPL-SCNC: 141 MMOL/L (ref 136–145)
TRIGL SERPL-MCNC: 95 MG/DL (ref ?–150)
VLDLC SERPL CALC-MCNC: 19 MG/DL
WBC # BLD AUTO: 6.5 K/UL (ref 3.6–11)

## 2022-05-25 PROCEDURE — G8753 SYS BP > OR = 140: HCPCS | Performed by: FAMILY MEDICINE

## 2022-05-25 PROCEDURE — G8427 DOCREV CUR MEDS BY ELIG CLIN: HCPCS | Performed by: FAMILY MEDICINE

## 2022-05-25 PROCEDURE — G8510 SCR DEP NEG, NO PLAN REQD: HCPCS | Performed by: FAMILY MEDICINE

## 2022-05-25 PROCEDURE — G8400 PT W/DXA NO RESULTS DOC: HCPCS | Performed by: FAMILY MEDICINE

## 2022-05-25 PROCEDURE — 1101F PT FALLS ASSESS-DOCD LE1/YR: CPT | Performed by: FAMILY MEDICINE

## 2022-05-25 PROCEDURE — G8536 NO DOC ELDER MAL SCRN: HCPCS | Performed by: FAMILY MEDICINE

## 2022-05-25 PROCEDURE — G8754 DIAS BP LESS 90: HCPCS | Performed by: FAMILY MEDICINE

## 2022-05-25 PROCEDURE — G8417 CALC BMI ABV UP PARAM F/U: HCPCS | Performed by: FAMILY MEDICINE

## 2022-05-25 PROCEDURE — G0439 PPPS, SUBSEQ VISIT: HCPCS | Performed by: FAMILY MEDICINE

## 2022-05-25 NOTE — PATIENT INSTRUCTIONS

## 2022-05-25 NOTE — PROGRESS NOTES
1. \"Have you been to the ER, urgent care clinic since your last visit? Hospitalized since your last visit? \" No    2. \"Have you seen or consulted any other health care providers outside of the [address] [place] [address] since your last visit? \" No     3. For patients aged 39-70: Has the patient had a colonoscopy / FIT/ Cologuard? Yes - no Care Gap present      If the patient is female:    4. For patients aged 41-77: Has the patient had a mammogram within the past 2 years? NA - based on age or sex      11. For patients aged 21-65: Has the patient had a pap smear?  NA - based on age or sex

## 2022-05-25 NOTE — PROGRESS NOTES
SUBJECTIVE:   Ms. Campos Dodson is a 81 y.o. female who is here for an annual wellness visit. She is well. She only had a cup of black coffee prior to today's visit. HTN: Pt's BP in the office today was elevated at 153/80, which she notes as greater than her blood pressure at home. Pt is compliant in taking Losartan 25mg. She has been cutting her 50mg Losartan tablets in half, so she has a 6 month supply of Losartan. Patient denies chest pain, CHARLES/SOB, edema, headache, visual changes, dizziness, palpitations or syncope. She takes her BP medication every 2 to 3 days. She notes an unstable blood pressure ranging between 116-140. OAB: Her urogynocologist prescribed Trospium 40mg, which lead to significant gastric symptoms. She now takes Mybetriq, which was effective and had no side effects. However there was a prior authorization problem with Mybetriq at the pharmacy, so her gyn will prescribe a new medication. She has to go to the bathroom a couple of times over the night without taking the medication. She now watches her meals by reducing carbs and fatty foods from her diet. Eye Surgery: She has completed laser surgery in December for removal of scar tissue after a cataract surgery. There was an improvement of her vision. She has stopped driving, and she has family and friends to provide her with transportation. Anesthesia Response: She had a prolonged response to anesthesia after her incomplete uterovaginal prolapse surgery. She was referred to a cardiologist for 2 weeks of monitoring, where there were no noted abnormalities. She takes an aspirin a day. There were no falls after a surgery, but she did have several episodes of syncope. Osteopenia: Many years ago she was prescribed Fosmax after an osteopenia diagnosis. This led to a hospital visit, since she had an adverse gastric side effect from the medication. She stays physically active by walking consistently.      She notes a consistently optimistic mood. She notes worsening shortness of breath. She currently uses hearing aids, and she struggles to hear more with her left ear. Pt specifically denies memory changes, trouble with swallowing or taste, CP, SOB, heartburn or upset stomach, change in bowel habits, problems urinating, unusual joint or muscle pains, numbness or tingling in extremities, or skin lesions of concern. She stays mentally active by playing bridge. At this time, she is otherwise doing well and has brought no other complaints to my attention today. For a list of the medical issues addressed today, see the assessment and plan below. PREVENTIVE:  Dexa: Deferred (Osteopenia)   COVID:   1nd:1/30/2021  2st: 02/27/2021  3rd: 12/07/2021     PMH:   Past Medical History:   Diagnosis Date    Adverse effect of anesthesia 2005    took a long time to go to sleep with last colonoscopy    CAD (coronary artery disease) 2010    MI? 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    Peripheral neuropathy     BOTH FEET    Shingles 2018     PSH:  has a past surgical history that includes hx colonoscopy; colonoscopy,diagnostic (9/9/2016); colonoscopy (N/A, 9/9/2016); hx cataract removal (Bilateral); hx orthopaedic; hx orthopaedic; hx dilation and curettage (10/2021); and hx bladder suspension (10/2021). All: is allergic to alendronate, augmentin [amoxicillin-pot clavulanate], and sulfamethoxazole-trimethoprim. MEDS:   Current Outpatient Medications   Medication Sig    losartan (COZAAR) 50 mg tablet TAKE 1 TABLET DAILY    simvastatin (ZOCOR) 20 mg tablet Take 1 Tablet by mouth nightly.  timolol maleate 0.5 % DrpD ophthalmic solution Administer 1 Drop to both eyes daily.  mirabegron ER (Myrbetriq) 50 mg ER tablet Myrbetriq 50 mg tablet,extended release   Take 1 tablet every day by oral route. (Patient not taking: Reported on 5/25/2022)     No current facility-administered medications for this visit. FH: family history includes Atrial Fibrillation in her mother and sister; Cancer in her father; Hypertension in her mother; Other in her brother. SH:  reports that she quit smoking about 63 years ago. She has a 1.00 pack-year smoking history. She has never used smokeless tobacco. She reports current alcohol use. She reports that she does not use drugs. Review of Systems - History obtained from the patient  General ROS: no fever, chills, fatigue, body aches  Psychological ROS: no change in anxiety, depression, SI/HI  Ophthalmic ROS: no blurred vision, myopia, double vision  ENT ROS: no dysphagia, otalgia, otorrhea, rhinorrhea, post nasal drip  Respiratory ROS: no cough, shortness of breath, or wheezing  Cardiovascular ROS: no chest pain or dyspnea on exertion  Gastrointestinal ROS: no abdominal pain, change in bowel habits, or black or bloody stools  Genito-Urinary ROS: no frequency, urgency, incontinence, dysuria, hematouria  Musculoskeletal ROS: no arthralagia, myalgia  Neurological ROS: no headaches, dizziness, lightheadedness, tremors, seizures  Dermatological ROS: no rash or lesions    OBJECTIVE:   Vitals:   Visit Vitals  BP (!) 153/80 (BP 1 Location: Right arm, BP Patient Position: Sitting, BP Cuff Size: Large adult)   Pulse 75   Temp 98.1 °F (36.7 °C) (Temporal)   Resp 16   Ht 5' 5\" (1.651 m)   Wt 171 lb 9.6 oz (77.8 kg)   SpO2 98%   BMI 28.56 kg/m²      Gen: Pleasant 81 y.o.  female in NAD. HEENT: PERRLA. EOMI. OP moist and pink. Neck: Supple. No LAD. HEART: RRR, No M/G/R.      LUNGS: CTAB No W/R. ABDOMEN: S, NT, ND, BS+. EXTREMITIES: Warm. No C/C/E.    MUSCULOSKELETAL: Normal ROM, muscle strength 5/5 all groups. NEURO: Alert and oriented x 3. Cranial nerves grossly intact. No focal sensory or motor deficits noted. SKIN: Warm. Dry. No rashes or other lesions noted. ASSESSMENT/ PLAN: Diagnoses and all orders for this visit:    1. Medicare annual wellness visit, subsequent    2. Primary hypertension  -     METABOLIC PANEL, COMPREHENSIVE; Future  -     CBC WITH AUTOMATED DIFF; Future    3. Mixed hyperlipidemia  -     LIPID PANEL; Future    4. Elevated hemoglobin A1c  -     HEMOGLOBIN A1C WITH EAG; Future    5. Screening for ischemic heart disease  -     LIPID PANEL; Future        ICD-10-CM ICD-9-CM    1. Medicare annual wellness visit, subsequent  Z00.00 V70.0    2. Primary hypertension  A77 608.8 METABOLIC PANEL, COMPREHENSIVE      CBC WITH AUTOMATED DIFF      CBC WITH AUTOMATED DIFF      METABOLIC PANEL, COMPREHENSIVE   3. Mixed hyperlipidemia  E78.2 272.2 LIPID PANEL      LIPID PANEL   4. Elevated hemoglobin A1c  R73.09 790.29 HEMOGLOBIN A1C WITH EAG      HEMOGLOBIN A1C WITH EAG   5. Screening for ischemic heart disease  Z13.6 V81.0 LIPID PANEL      LIPID PANEL        Hypertension: BP seems to be somewhat well controlled. I recommended continuing current dose of Losartan, eating a low sodium diet, and increasing exercise. Recheck CMP and CBC. Hyperlipidemia: Lipid panel shows cholesterol seems to be well controlled. I recommended eating a low fat diet, and increasing exercise. Recheck lipid panel. Elevated hemoglobin A1c: I advised pt to avoid sugars and starches, and to increase exercise when possible. Recheck hemoglobin A1c. Screening for ischemic heart disease: I ordered a lipid panel. Follow-up and Dispositions    · Return in about 6 months (around 11/25/2022), or if symptoms worsen or fail to improve. I have reviewed the patient's medications and risks/side effects/benefits were discussed. Diagnosis(-es) explained to patient and questions answered. Literature provided where appropriate.      Written by Fabian Sport, Jarrod Salgado, as dictated by Darrian Do MD.

## 2022-05-25 NOTE — PROGRESS NOTES
This is the Subsequent Medicare Annual Wellness Exam, performed 12 months or more after the Initial AWV or the last Subsequent AWV    I have reviewed the patient's medical history in detail and updated the computerized patient record. Assessment/Plan   Education and counseling provided:  Are appropriate based on today's review and evaluation  Influenza Vaccine  Cardiovascular screening blood test  Screening for glaucoma    1. Medicare annual wellness visit, subsequent  2. Primary hypertension  -     METABOLIC PANEL, COMPREHENSIVE; Future  -     CBC WITH AUTOMATED DIFF; Future  3. Mixed hyperlipidemia  -     LIPID PANEL; Future  4. Elevated hemoglobin A1c  -     HEMOGLOBIN A1C WITH EAG; Future  5. Screening for ischemic heart disease  -     LIPID PANEL; Future       Depression Risk Factor Screening     3 most recent PHQ Screens 5/25/2022   Little interest or pleasure in doing things Not at all   Feeling down, depressed, irritable, or hopeless Not at all   Total Score PHQ 2 0       Alcohol & Drug Abuse Risk Screen    Do you average more than 1 drink per night or more than 7 drinks a week:  No    On any one occasion in the past three months have you have had more than 3 drinks containing alcohol:  No          Functional Ability and Level of Safety    Hearing: decreased      Activities of Daily Living: The home contains: grab bars  Patient needs help with:  transportation      Ambulation: with no difficulty     Fall Risk:  Fall Risk Assessment, last 12 mths 5/25/2022   Able to walk? Yes   Fall in past 12 months? 0   Do you feel unsteady?  0   Are you worried about falling 0   Is the gait abnormal? -      Abuse Screen:  Patient is not abused       Cognitive Screening    Has your family/caregiver stated any concerns about your memory: no         Health Maintenance Due     Health Maintenance Due   Topic Date Due    DTaP/Tdap/Td series (1 - Tdap) Never done    Shingrix Vaccine Age 50> (1 of 2) Never done    Bone Densitometry (Dexa) Screening  Never done    Pneumococcal 65+ years (2 - PCV) 11/10/2015    COVID-19 Vaccine (3 - Booster for Pfizer series) 07/27/2021    Lipid Screen  04/23/2022       Patient Care Team   Patient Care Team:  Stacey Crowder MD as PCP - General (Family Medicine)  Stacey Crowder MD as PCP - [facility] Provider  Alison Maki MD (Ophthalmology)    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    TIA (transient ischemic attack) G45.9    Hyperlipidemia E78.5    EKG abnormalities R94.31    Hyperglycemia R73.9    Allergic conjunctivitis H10.10    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 M54.41, G89.29    Displacement of lumbar intervertebral 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  Chronic pain     hip rt    Fuchs' corneal dystrophy     Gastrointestinal disorder     GERD    GERD (gastroesophageal reflux disease)     Glaucoma     High cholesterol     Hypertension     Ill-defined condition     osteopenia    Ill-defined condition     pulled shoulder lt.  and elbow replacement left    Peripheral neuropathy     BOTH FEET    Shingles 2018      Past Surgical History:   Procedure Laterality Date    COLONOSCOPY N/A 9/9/2016    COLONOSCOPY performed by Darryle Setters, [facility] at [facility]  9/9/2016         HX BLADDER SUSPENSION  10/2021    HX CATARACT REMOVAL Bilateral     HX COLONOSCOPY      HX DILATION AND CURETTAGE  10/2021    HX ORTHOPAEDIC      HERNIATED DISC 2017    HX ORTHOPAEDIC       Current Outpatient Medications   Medication Sig Dispense Refill    losartan (COZAAR) 50 mg tablet TAKE 1 TABLET DAILY 90 Tablet 3    simvastatin (ZOCOR) 20 mg tablet Take 1 Tablet by mouth nightly. 90 Tablet 3    timolol maleate 0.5 % DrpD ophthalmic solution Administer 1 Drop to both eyes daily.  mirabegron ER (Myrbetriq) 50 mg ER tablet Myrbetriq 50 mg tablet,extended release   Take 1 tablet every day by oral route.  (Patient not taking: Reported on 2022)       Allergies   Allergen Reactions    Alendronate Nausea Only     Other reaction(s): Abdominal Pain    Augmentin [Amoxicillin-Pot Clavulanate] Rash    Sulfamethoxazole-Trimethoprim Other (comments)     thrush       Family History   Problem Relation Age of Onset    Hypertension Mother     Atrial Fibrillation Mother     Cancer Father     Atrial Fibrillation Sister     Other Brother         detached retina    Anesth Problems Neg Hx      Social History     Tobacco Use    Smoking status: Former Smoker     Packs/day: 0.50     Years: 2.00     Pack years: 1.00     Quit date: 1959     Years since quittin.4    Smokeless tobacco: Never Used   Substance Use Topics    Alcohol use: Yes     Comment: thu Mendoza MD

## 2022-06-14 NOTE — PROGRESS NOTES
A1c:Your current hgbA1c is slightly higher but relatively stable. Work on following a diabetic diet and exercise as tolerated. Recheck this test: hgbA1c  in  3 months. Continue with current  medications. CMP:Normal electrolyte levels except for an elevation in the glucose level, normal renal and liver function.   CBC: Normal  Lipid panel: Normal

## 2023-06-07 ENCOUNTER — OFFICE VISIT (OUTPATIENT)
Age: 88
End: 2023-06-07
Payer: MEDICARE

## 2023-06-07 VITALS
HEART RATE: 65 BPM | DIASTOLIC BLOOD PRESSURE: 66 MMHG | SYSTOLIC BLOOD PRESSURE: 121 MMHG | TEMPERATURE: 97.3 F | HEIGHT: 65 IN | BODY MASS INDEX: 28.16 KG/M2 | OXYGEN SATURATION: 95 % | RESPIRATION RATE: 18 BRPM | WEIGHT: 169 LBS

## 2023-06-07 DIAGNOSIS — I10 ESSENTIAL (PRIMARY) HYPERTENSION: ICD-10-CM

## 2023-06-07 DIAGNOSIS — E78.2 MIXED HYPERLIPIDEMIA: ICD-10-CM

## 2023-06-07 DIAGNOSIS — R73.09 ELEVATED HEMOGLOBIN A1C: ICD-10-CM

## 2023-06-07 DIAGNOSIS — Z00.00 MEDICARE ANNUAL WELLNESS VISIT, SUBSEQUENT: Primary | ICD-10-CM

## 2023-06-07 DIAGNOSIS — Z71.89 ACP (ADVANCE CARE PLANNING): ICD-10-CM

## 2023-06-07 DIAGNOSIS — Z78.0 POST-MENOPAUSAL: ICD-10-CM

## 2023-06-07 DIAGNOSIS — M85.80 OSTEOPENIA, UNSPECIFIED LOCATION: ICD-10-CM

## 2023-06-07 PROCEDURE — 1123F ACP DISCUSS/DSCN MKR DOCD: CPT | Performed by: FAMILY MEDICINE

## 2023-06-07 PROCEDURE — G0439 PPPS, SUBSEQ VISIT: HCPCS | Performed by: FAMILY MEDICINE

## 2023-06-07 RX ORDER — ANTIOX #8/OM3/DHA/EPA/LUT/ZEAX 250-2.5 MG
CAPSULE ORAL
COMMUNITY

## 2023-06-07 ASSESSMENT — PATIENT HEALTH QUESTIONNAIRE - PHQ9
SUM OF ALL RESPONSES TO PHQ QUESTIONS 1-9: 0
SUM OF ALL RESPONSES TO PHQ9 QUESTIONS 1 & 2: 0
1. LITTLE INTEREST OR PLEASURE IN DOING THINGS: 0
SUM OF ALL RESPONSES TO PHQ QUESTIONS 1-9: 0
2. FEELING DOWN, DEPRESSED OR HOPELESS: 0

## 2023-06-07 ASSESSMENT — LIFESTYLE VARIABLES
HOW MANY STANDARD DRINKS CONTAINING ALCOHOL DO YOU HAVE ON A TYPICAL DAY: 1 OR 2
HOW OFTEN DO YOU HAVE A DRINK CONTAINING ALCOHOL: MONTHLY OR LESS

## 2023-06-07 NOTE — PROGRESS NOTES
1. \"Have you been to the ER, urgent care clinic since your last visit? Hospitalized since your last visit?\"     2. \"Have you seen or consulted any other health care providers outside of the [address] [place] [address] since your last visit? \"     3. For patients aged 39-70: Has the patient had a colonoscopy / FIT/ Cologuard? If the patient is female:    4. For patients aged 41-77: Has the patient had a mammogram within the past 2 years? 5. For patients aged 21-65: Has the patient had a pap smear?

## 2023-06-07 NOTE — PROGRESS NOTES
1. \"Have you been to the ER, urgent care clinic since your last visit? Hospitalized since your last visit? \" Yes Urgent care, for cough, 05/2023    2. \"Have you seen or consulted any other health care providers outside of the [address] [place] [address] since your last visit? \" No     3. For patients aged 39-70: Has the patient had a colonoscopy / FIT/ Cologuard? NA - based on age      If the patient is female:    4. For patients aged 41-77: Has the patient had a mammogram within the past 2 years? NA - based on age or sex      11. For patients aged 21-65: Has the patient had a pap smear?  NA - based on age or sex

## 2023-06-07 NOTE — PROGRESS NOTES
SUBJECTIVE:   Ms. Justina Ramirez is a 81 y.o. female who is here for a Medicare Annual Wellness Visit. Pt states that she is \"feeling really great. \"    HTN: Pt is on losartan 50mg daily. Pt's initial BP was 148/73 mmHg and repeat BP was 121/66 mmHg in office today. Pt recently went to the hospital for a syncopal episode secondary to anesthesia. She states that she was sent home from the hospital but began to feel lightheaded again and returned to the hospital. She had extensive testing done to rule out any other causes beside the anesthesia and wore a heart monitor. She had unremarkable results. Pt states that her BP is occasionally elevated the first time she checks it but becomes regular on the second check. UTI: Pt saw Dr. Moy Mathew for urinary urgency and recurrent UTI. She previously tried [employer] but states she D/C'd this due to back aches and headaches. She is managing this without medication on her own. DEXA: Pt had a DEXA scan ~25 years ago that showed osteopenia. She has previously declined to have a repeat DEXA scan. She was previously Rx'd Fossomax but was intolerant of this and had to D/C the medication. Pt was previously doing Physical Therapy but admits she is no longer doing PT exercises at home. She states there were exercises that she does not have the equipment to perform at home. She gets regular weight-bearing exercise by walking. She is careful and avoids falling by holding railings. She no longer drives and ambulates with a cane. Pt states that she has advanced directives at home. She states that she has a great support system with her brother, brother's wife, sister and grandchildren. Pt states she has some hearing loss. She wears hearing aids but continues to have trouble hearing. Pt was dx'd with macular degeneration. Her ophthalmologist instructed her to begin taking OTC PreserVision 2 tablets BID. She states that this has helped her sxs.      At this time, she

## 2023-06-07 NOTE — PROGRESS NOTES
Medicare Annual Wellness Visit    Hollie Calderon is here for Medicare AWV    Assessment & Plan   Medicare annual wellness visit, subsequent  ACP (advance care planning)  Recommendations for Preventive Services Due: see orders and patient instructions/AVS.  Recommended screening schedule for the next 5-10 years is provided to the patient in written form: see Patient Instructions/AVS.     Return in 6 months (on 12/7/2023). Subjective       Patient's complete Health Risk Assessment and screening values have been reviewed and are found in Flowsheets. The following problems were reviewed today and where indicated follow up appointments were made and/or referrals ordered. Positive Risk Factor Screenings with Interventions:                   Hearing Screen:  Do you or your family notice any trouble with your hearing that hasn't been managed with hearing aids?: (!) Yes    Interventions:                         Objective   Vitals:    06/07/23 0952 06/07/23 0953   BP: (!) 148/73 121/66   Site: Left Upper Arm Left Upper Arm   Position: Sitting Sitting   Cuff Size: Large Adult Large Adult   Pulse: 65    Resp: 18    Temp: 97.3 °F (36.3 °C)    TempSrc: Temporal    SpO2: 95%    Weight: 169 lb (76.7 kg)    Height: 5' 5\" (1.651 m)       Body mass index is 28.12 kg/m². Allergies   Allergen Reactions    Alendronate Nausea Only     Other reaction(s): Abdominal Pain    Amoxicillin-Pot Clavulanate Rash    Sulfamethoxazole-Trimethoprim Other (See Comments)     thrush     Prior to Visit Medications    Medication Sig Taking?  Authorizing Provider   Multiple Vitamins-Minerals (PRESERVISION AREDS 2) CAPS Take by mouth Yes Historical Provider, MD   losartan (COZAAR) 50 MG tablet TAKE 1 TABLET DAILY Yes Ar Automatic Reconciliation   simvastatin (ZOCOR) 20 MG tablet Take 1 tablet by mouth Yes Ar Automatic Reconciliation   Timolol (TIMOPTIC) 0.5 % SOLN ophthalmic solution Apply 1 drop to eye daily Yes Ar Automatic

## 2023-06-08 LAB
ALBUMIN SERPL-MCNC: 4.1 G/DL (ref 3.5–5)
ALBUMIN/GLOB SERPL: 1.6 (ref 1.1–2.2)
ALP SERPL-CCNC: 98 U/L (ref 45–117)
ALT SERPL-CCNC: 22 U/L (ref 12–78)
ANION GAP SERPL CALC-SCNC: 5 MMOL/L (ref 5–15)
AST SERPL-CCNC: 20 U/L (ref 15–37)
BASOPHILS # BLD: 0 K/UL (ref 0–0.1)
BASOPHILS NFR BLD: 1 % (ref 0–1)
BILIRUB SERPL-MCNC: 0.6 MG/DL (ref 0.2–1)
BUN SERPL-MCNC: 21 MG/DL (ref 6–20)
BUN/CREAT SERPL: 28 (ref 12–20)
CALCIUM SERPL-MCNC: 10.1 MG/DL (ref 8.5–10.1)
CHLORIDE SERPL-SCNC: 110 MMOL/L (ref 97–108)
CHOLEST SERPL-MCNC: 158 MG/DL
CO2 SERPL-SCNC: 29 MMOL/L (ref 21–32)
CREAT SERPL-MCNC: 0.76 MG/DL (ref 0.55–1.02)
DIFFERENTIAL METHOD BLD: NORMAL
EOSINOPHIL # BLD: 0.1 K/UL (ref 0–0.4)
EOSINOPHIL NFR BLD: 2 % (ref 0–7)
ERYTHROCYTE [DISTWIDTH] IN BLOOD BY AUTOMATED COUNT: 12.8 % (ref 11.5–14.5)
EST. AVERAGE GLUCOSE BLD GHB EST-MCNC: 126 MG/DL
GLOBULIN SER CALC-MCNC: 2.6 G/DL (ref 2–4)
GLUCOSE SERPL-MCNC: 108 MG/DL (ref 65–100)
HBA1C MFR BLD: 6 % (ref 4–5.6)
HCT VFR BLD AUTO: 45.4 % (ref 35–47)
HDLC SERPL-MCNC: 48 MG/DL
HDLC SERPL: 3.3 (ref 0–5)
HGB BLD-MCNC: 14.2 G/DL (ref 11.5–16)
IMM GRANULOCYTES # BLD AUTO: 0 K/UL (ref 0–0.04)
IMM GRANULOCYTES NFR BLD AUTO: 0 % (ref 0–0.5)
LDLC SERPL CALC-MCNC: 82.6 MG/DL (ref 0–100)
LYMPHOCYTES # BLD: 2.4 K/UL (ref 0.8–3.5)
LYMPHOCYTES NFR BLD: 35 % (ref 12–49)
MCH RBC QN AUTO: 29.9 PG (ref 26–34)
MCHC RBC AUTO-ENTMCNC: 31.3 G/DL (ref 30–36.5)
MCV RBC AUTO: 95.6 FL (ref 80–99)
MONOCYTES # BLD: 0.5 K/UL (ref 0–1)
MONOCYTES NFR BLD: 7 % (ref 5–13)
NEUTS SEG # BLD: 3.8 K/UL (ref 1.8–8)
NEUTS SEG NFR BLD: 55 % (ref 32–75)
NRBC # BLD: 0 K/UL (ref 0–0.01)
NRBC BLD-RTO: 0 PER 100 WBC
PLATELET # BLD AUTO: 202 K/UL (ref 150–400)
PMV BLD AUTO: 10.8 FL (ref 8.9–12.9)
POTASSIUM SERPL-SCNC: 4.4 MMOL/L (ref 3.5–5.1)
PROT SERPL-MCNC: 6.7 G/DL (ref 6.4–8.2)
RBC # BLD AUTO: 4.75 M/UL (ref 3.8–5.2)
SODIUM SERPL-SCNC: 144 MMOL/L (ref 136–145)
TRIGL SERPL-MCNC: 137 MG/DL
VLDLC SERPL CALC-MCNC: 27.4 MG/DL
WBC # BLD AUTO: 6.9 K/UL (ref 3.6–11)

## 2023-07-05 NOTE — TELEPHONE ENCOUNTER
Future Appointments:  Future Appointments   Date Time Provider [address]  [address]   9/8/2023  8:00 AM [facility] DEXA 1 MRMRMAM [facility]        Last Appointment With Me:  6/7/2023     Requested Prescriptions     Pending Prescriptions Disp Refills    simvastatin (ZOCOR) 20 MG tablet 30 tablet      Sig: Take 1 tablet by mouth

## 2023-07-09 RX ORDER — SIMVASTATIN 20 MG
20 TABLET ORAL NIGHTLY
Qty: 30 TABLET | Refills: 9 | Status: SHIPPED | OUTPATIENT
Start: 2023-07-09 | End: 2023-07-16 | Stop reason: SDUPTHER

## 2023-07-12 NOTE — TELEPHONE ENCOUNTER
Future Appointments:  Future Appointments   Date Time Provider 4600  46[facility]   9/8/2023  8:00 AM [facility] DEXA 1 MRMRMAM [facility]        Last Appointment With Me:  6/7/2023     Requested Prescriptions     Pending Prescriptions Disp Refills    simvastatin (ZOCOR) 20 MG tablet 30 tablet 9     Sig: Take 1 tablet by mouth nightly

## 2023-07-16 RX ORDER — SIMVASTATIN 20 MG
20 TABLET ORAL NIGHTLY
Qty: 30 TABLET | Refills: 9 | Status: SHIPPED | OUTPATIENT
Start: 2023-07-16

## 2023-07-19 NOTE — TELEPHONE ENCOUNTER
Future Appointments:  Future Appointments   Date Time Provider 4600  46[facility]   9/8/2023  8:00 AM [facility] DEXA 1 [facility]        Last Appointment With Me:  6/7/2023     Requested Prescriptions     Pending Prescriptions Disp Refills    simvastatin (ZOCOR) 20 MG tablet 30 tablet 9     Sig: Take 1 tablet by mouth nightly

## 2023-07-20 RX ORDER — SIMVASTATIN 20 MG
20 TABLET ORAL NIGHTLY
Qty: 30 TABLET | Refills: 9 | Status: SHIPPED | OUTPATIENT
Start: 2023-07-20

## 2023-07-25 ENCOUNTER — TELEPHONE (OUTPATIENT)
Age: 88
End: 2023-07-25

## 2023-07-25 DIAGNOSIS — E78.2 MIXED HYPERLIPIDEMIA: Primary | ICD-10-CM

## 2023-07-25 NOTE — TELEPHONE ENCOUNTER
Patient called in stating she received notification from [facility] stating her medication was denied.    ZOCOR 20mg  Please call to advise

## 2023-07-26 DIAGNOSIS — E78.2 MIXED HYPERLIPIDEMIA: Primary | ICD-10-CM

## 2023-07-26 RX ORDER — SIMVASTATIN 20 MG
20 TABLET ORAL NIGHTLY
Qty: 90 TABLET | Refills: 1 | Status: SHIPPED | OUTPATIENT
Start: 2023-07-26

## 2023-07-26 RX ORDER — SIMVASTATIN 20 MG
20 TABLET ORAL NIGHTLY
Qty: 90 TABLET | Refills: 1 | Status: SHIPPED | OUTPATIENT
Start: 2023-07-26 | End: 2023-07-26 | Stop reason: SDUPTHER

## 2023-07-26 RX ORDER — LOSARTAN POTASSIUM 50 MG/1
50 TABLET ORAL DAILY
Qty: 15 TABLET | Refills: 0 | Status: SHIPPED | OUTPATIENT
Start: 2023-07-26

## 2023-07-26 RX ORDER — SIMVASTATIN 20 MG
20 TABLET ORAL NIGHTLY
Qty: 15 TABLET | Refills: 1 | Status: SHIPPED | OUTPATIENT
Start: 2023-07-26

## 2023-07-26 RX ORDER — SIMVASTATIN 20 MG
20 TABLET ORAL NIGHTLY
Qty: 15 TABLET | Refills: 1 | Status: SHIPPED | OUTPATIENT
Start: 2023-07-26 | End: 2023-07-26 | Stop reason: SDUPTHER

## 2023-07-26 RX ORDER — LOSARTAN POTASSIUM 50 MG/1
50 TABLET ORAL DAILY
Qty: 90 TABLET | Refills: 3 | Status: SHIPPED | OUTPATIENT
Start: 2023-07-26

## 2023-07-26 NOTE — TELEPHONE ENCOUNTER
Spoke with patient  RX for Zocor was sent on 7/20/2023 to [facility]    Patient states she no longer uses [facility]  Prefers [facility]  Patient has 2 left and would like a small supply sent to [employer] until mail order arrives     PCP: Dorene Ledezma MD    Last appt: 6/7/2023  Future Appointments   Date Time Provider [address]   9/8/2023  8:00 AM [facility] DEXA 1 MR[facility]MAM [facility]       Requested Prescriptions     Pending Prescriptions Disp Refills    losartan (COZAAR) 50 MG tablet 90 tablet 3     Sig: Take 1 tablet by mouth daily    losartan (COZAAR) 50 MG tablet 15 tablet 0     Sig: Take 1 tablet by mouth daily

## 2023-09-08 ENCOUNTER — HOSPITAL ENCOUNTER (OUTPATIENT)
Facility: HOSPITAL | Age: 88
End: 2023-09-08
Attending: FAMILY MEDICINE
Payer: MEDICARE

## 2023-09-08 DIAGNOSIS — M85.80 OSTEOPENIA, UNSPECIFIED LOCATION: ICD-10-CM

## 2023-09-08 DIAGNOSIS — Z78.0 POST-MENOPAUSAL: ICD-10-CM

## 2023-09-08 PROCEDURE — 77080 DXA BONE DENSITY AXIAL: CPT

## 2023-09-20 ENCOUNTER — TELEPHONE (OUTPATIENT)
Age: 88
End: 2023-09-20

## 2023-09-20 DIAGNOSIS — S22.008A OTHER CLOSED FRACTURE OF THORACIC VERTEBRA, UNSPECIFIED THORACIC VERTEBRAL LEVEL, INITIAL ENCOUNTER (HCC): Primary | ICD-10-CM

## 2023-09-25 NOTE — TELEPHONE ENCOUNTER
Can they check bp twice daily for a week. She may benefit from an additional bp medication. I called Elie Gimenez in response to a referral that was received for patient to establish care with Hematology. Outreach was made to schedule a consultation. I left a voicemail explaining the reason for my call and advised patient to call [facility] at [phone]. This is the third attempt to schedule patient unsuccessfully. The referral has been closed, a [facility] message has been sent to patient (if applicable) and a letter has been sent to the address on file.

## 2024-01-08 ENCOUNTER — TELEPHONE (OUTPATIENT)
Age: 89
End: 2024-01-08

## 2024-01-08 NOTE — TELEPHONE ENCOUNTER
----- Message from Marilyn Gomez sent at 1/8/2024  3:51 PM EST -----  Subject: Appointment Request    Reason for Call: Established Patient Appointment needed: Routine Existing   Condition Follow Up    QUESTIONS    Reason for appointment request? No appointments available during search     Additional Information for Provider? pt needs an in person appt. None was   avaialble.   ---------------------------------------------------------------------------  --------------  CALL BACK INFO  [phone]; OK to leave message on voicemail  ---------------------------------------------------------------------------  --------------  SCRIPT ANSWERS

## 2024-01-25 ENCOUNTER — OFFICE VISIT (OUTPATIENT)
Age: 89
End: 2024-01-25
Payer: MEDICARE

## 2024-01-25 VITALS
RESPIRATION RATE: 20 BRPM | BODY MASS INDEX: 27.8 KG/M2 | DIASTOLIC BLOOD PRESSURE: 83 MMHG | HEART RATE: 70 BPM | TEMPERATURE: 97.2 F | OXYGEN SATURATION: 96 % | SYSTOLIC BLOOD PRESSURE: 151 MMHG | HEIGHT: 66 IN | WEIGHT: 173 LBS

## 2024-01-25 DIAGNOSIS — M81.0 AGE-RELATED OSTEOPOROSIS WITHOUT CURRENT PATHOLOGICAL FRACTURE: ICD-10-CM

## 2024-01-25 DIAGNOSIS — R73.09 ELEVATED HEMOGLOBIN A1C: ICD-10-CM

## 2024-01-25 DIAGNOSIS — R06.83 LOUD SNORING: ICD-10-CM

## 2024-01-25 DIAGNOSIS — E78.2 MIXED HYPERLIPIDEMIA: Primary | ICD-10-CM

## 2024-01-25 DIAGNOSIS — I10 ESSENTIAL (PRIMARY) HYPERTENSION: ICD-10-CM

## 2024-01-25 PROCEDURE — 99214 OFFICE O/P EST MOD 30 MIN: CPT | Performed by: FAMILY MEDICINE

## 2024-01-25 PROCEDURE — G8427 DOCREV CUR MEDS BY ELIG CLIN: HCPCS | Performed by: FAMILY MEDICINE

## 2024-01-25 PROCEDURE — 4004F PT TOBACCO SCREEN RCVD TLK: CPT | Performed by: FAMILY MEDICINE

## 2024-01-25 PROCEDURE — G8419 CALC BMI OUT NRM PARAM NOF/U: HCPCS | Performed by: FAMILY MEDICINE

## 2024-01-25 PROCEDURE — 1090F PRES/ABSN URINE INCON ASSESS: CPT | Performed by: FAMILY MEDICINE

## 2024-01-25 PROCEDURE — 1123F ACP DISCUSS/DSCN MKR DOCD: CPT | Performed by: FAMILY MEDICINE

## 2024-01-25 PROCEDURE — G8484 FLU IMMUNIZE NO ADMIN: HCPCS | Performed by: FAMILY MEDICINE

## 2024-01-25 RX ORDER — CALCIUM CARBONATE 500(1250)
500 TABLET ORAL DAILY
COMMUNITY

## 2024-01-25 NOTE — PROGRESS NOTES
1. \"Have you been to the ER, urgent care clinic since your last visit?  Hospitalized since your last visit?\" No    2. \"Have you seen or consulted any other health care providers outside of the [facility] since your last visit?\" No     3. For patients aged 45-75: Has the patient had a colonoscopy / FIT/ Cologuard? NA - based on age      If the patient is female:    4. For patients aged 40-74: Has the patient had a mammogram within the past 2 years? NA - based on age or sex      5. For patients aged 21-65: Has the patient had a pap smear? NA - based on age or sex  
A1c  Controlled. Pt will continue current regimen.     Age-related osteoporosis without current pathological fracture  I advised the pt to take calcium vitamins to improve her bone density. I also advised the patient to continue taking vitamin D supplements.     Loud snoring   I referred her to Dr. Lexi Rodriguez for further evaluation of her snoring.   -     [facility] - Lexi Rodriguez MD, Sleep Medicine, [place]    I advised the patient to take Gas-X to help with the gas. I also advised the patient to include foods with probiotics to help with her gut surinder.     I have reviewed the patient's medications and risks/side effects/benefits were discussed. Diagnosis(-es) explained to patient and questions answered. Literature provided where appropriate.     I, Sonya Nguyen, am scribing for, and in the presence of, Dr Dania Reddy. Electronically signed by: Sonya Nguyen 1/25/2024

## 2024-02-08 ENCOUNTER — TELEPHONE (OUTPATIENT)
Age: 89
End: 2024-02-08

## 2024-07-25 ENCOUNTER — OFFICE VISIT (OUTPATIENT)
Age: 89
End: 2024-07-25
Payer: MEDICARE

## 2024-07-25 VITALS
TEMPERATURE: 98.1 F | HEART RATE: 78 BPM | OXYGEN SATURATION: 96 % | BODY MASS INDEX: 27.23 KG/M2 | DIASTOLIC BLOOD PRESSURE: 74 MMHG | RESPIRATION RATE: 20 BRPM | SYSTOLIC BLOOD PRESSURE: 147 MMHG | WEIGHT: 169.4 LBS | HEIGHT: 66 IN

## 2024-07-25 DIAGNOSIS — Z00.00 MEDICARE ANNUAL WELLNESS VISIT, SUBSEQUENT: Primary | ICD-10-CM

## 2024-07-25 DIAGNOSIS — R73.09 ELEVATED HEMOGLOBIN A1C: ICD-10-CM

## 2024-07-25 DIAGNOSIS — E78.2 MIXED HYPERLIPIDEMIA: ICD-10-CM

## 2024-07-25 DIAGNOSIS — Z13.6 SCREENING FOR CARDIOVASCULAR CONDITION: ICD-10-CM

## 2024-07-25 DIAGNOSIS — I10 ESSENTIAL (PRIMARY) HYPERTENSION: ICD-10-CM

## 2024-07-25 LAB
ALBUMIN SERPL-MCNC: 4 G/DL (ref 3.5–5)
ALBUMIN/GLOB SERPL: 1.5 (ref 1.1–2.2)
ALP SERPL-CCNC: 73 U/L (ref 45–117)
ALT SERPL-CCNC: 30 U/L (ref 12–78)
ANION GAP SERPL CALC-SCNC: 3 MMOL/L (ref 5–15)
AST SERPL-CCNC: 27 U/L (ref 15–37)
BASOPHILS # BLD: 0 K/UL (ref 0–0.1)
BASOPHILS NFR BLD: 0 % (ref 0–1)
BILIRUB SERPL-MCNC: 0.7 MG/DL (ref 0.2–1)
BUN SERPL-MCNC: 26 MG/DL (ref 6–20)
BUN/CREAT SERPL: 30 (ref 12–20)
CALCIUM SERPL-MCNC: 10.4 MG/DL (ref 8.5–10.1)
CHLORIDE SERPL-SCNC: 109 MMOL/L (ref 97–108)
CHOLEST SERPL-MCNC: 161 MG/DL
CO2 SERPL-SCNC: 30 MMOL/L (ref 21–32)
CREAT SERPL-MCNC: 0.88 MG/DL (ref 0.55–1.02)
DIFFERENTIAL METHOD BLD: NORMAL
EOSINOPHIL # BLD: 0.2 K/UL (ref 0–0.4)
EOSINOPHIL NFR BLD: 3 % (ref 0–7)
ERYTHROCYTE [DISTWIDTH] IN BLOOD BY AUTOMATED COUNT: 12.6 % (ref 11.5–14.5)
EST. AVERAGE GLUCOSE BLD GHB EST-MCNC: 128 MG/DL
GLOBULIN SER CALC-MCNC: 2.7 G/DL (ref 2–4)
GLUCOSE SERPL-MCNC: 111 MG/DL (ref 65–100)
HBA1C MFR BLD: 6.1 % (ref 4–5.6)
HCT VFR BLD AUTO: 43.1 % (ref 35–47)
HDLC SERPL-MCNC: 44 MG/DL
HDLC SERPL: 3.7 (ref 0–5)
HGB BLD-MCNC: 13.7 G/DL (ref 11.5–16)
IMM GRANULOCYTES # BLD AUTO: 0 K/UL (ref 0–0.04)
IMM GRANULOCYTES NFR BLD AUTO: 0 % (ref 0–0.5)
LDLC SERPL CALC-MCNC: 92 MG/DL (ref 0–100)
LYMPHOCYTES # BLD: 2.6 K/UL (ref 0.8–3.5)
LYMPHOCYTES NFR BLD: 37 % (ref 12–49)
MCH RBC QN AUTO: 30.2 PG (ref 26–34)
MCHC RBC AUTO-ENTMCNC: 31.8 G/DL (ref 30–36.5)
MCV RBC AUTO: 94.9 FL (ref 80–99)
MONOCYTES # BLD: 0.5 K/UL (ref 0–1)
MONOCYTES NFR BLD: 7 % (ref 5–13)
NEUTS SEG # BLD: 3.7 K/UL (ref 1.8–8)
NEUTS SEG NFR BLD: 53 % (ref 32–75)
NRBC # BLD: 0 K/UL (ref 0–0.01)
NRBC BLD-RTO: 0 PER 100 WBC
PLATELET # BLD AUTO: 176 K/UL (ref 150–400)
PMV BLD AUTO: 10.9 FL (ref 8.9–12.9)
POTASSIUM SERPL-SCNC: 5.2 MMOL/L (ref 3.5–5.1)
PROT SERPL-MCNC: 6.7 G/DL (ref 6.4–8.2)
RBC # BLD AUTO: 4.54 M/UL (ref 3.8–5.2)
SODIUM SERPL-SCNC: 142 MMOL/L (ref 136–145)
TRIGL SERPL-MCNC: 125 MG/DL
VLDLC SERPL CALC-MCNC: 25 MG/DL
WBC # BLD AUTO: 7 K/UL (ref 3.6–11)

## 2024-07-25 PROCEDURE — G0439 PPPS, SUBSEQ VISIT: HCPCS | Performed by: FAMILY MEDICINE

## 2024-07-25 PROCEDURE — 1123F ACP DISCUSS/DSCN MKR DOCD: CPT | Performed by: FAMILY MEDICINE

## 2024-07-25 RX ORDER — OMEPRAZOLE 40 MG/1
1 CAPSULE, DELAYED RELEASE ORAL DAILY
COMMUNITY
Start: 2019-08-30

## 2024-07-25 NOTE — TELEPHONE ENCOUNTER
Requested Prescriptions     Pending Prescriptions Disp Refills    losartan (COZAAR) 50 MG tablet 90 tablet 3     Sig: Take 1 tablet by mouth daily

## 2024-07-25 NOTE — PROGRESS NOTES
Medicare Annual Wellness Visit    Annemarie Oneal is here for Medicare AWV    Assessment & Plan   Medicare annual wellness visit, subsequent  Elevated hemoglobin A1c  -     Hemoglobin A1C; Future  Essential (primary) hypertension  -     CBC with Auto Differential; Future  -     Comprehensive Metabolic Panel; Future  Mixed hyperlipidemia  Screening for cardiovascular condition  -     Lipid Panel; Future    Recommendations for Preventive Services Due: see orders and patient instructions/AVS.  Recommended screening schedule for the next 5-10 years is provided to the patient in written form: see Patient Instructions/AVS.     No follow-ups on file.     Subjective       Patient's complete Health Risk Assessment and screening values have been reviewed and are found in Flowsheets. The following problems were reviewed today and where indicated follow up appointments were made and/or referrals ordered.    Positive Risk Factor Screenings with Interventions:    Fall Risk:  Do you feel unsteady or are you worried about falling? : (!) yes  2 or more falls in past year?: no  Fall with injury in past year?: no     Interventions:    Reviewed medications, home hazards, visual acuity, and co-morbidities that can increase risk for falls            General HRA Questions:  Select all that apply: (!) New or Increased Fatigue, Stress  Interventions Fatigue:  Patient advised to follow up in the office for further evaluation and treatment  Interventions - Stress:  Patient advised to follow up in the office for further evaluation and treatment          Hearing Screen:  Do you or your family notice any trouble with your hearing that hasn't been managed with hearing aids?: (!) Yes    Interventions:  Patient comments: She plans to follow up with ENT                     Objective   Vitals:    07/25/24 0905 07/25/24 0916   BP: (!) 145/74 (!) 147/74   Site: Left Upper Arm Right Upper Arm   Position: Sitting Sitting   Cuff Size: Medium Adult
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
## 2024-07-26 RX ORDER — LOSARTAN POTASSIUM 50 MG/1
50 TABLET ORAL DAILY
Qty: 90 TABLET | Refills: 3 | Status: SHIPPED | OUTPATIENT
Start: 2024-07-26

## 2024-12-11 ENCOUNTER — TELEPHONE (OUTPATIENT)
Age: 89
End: 2024-12-11

## 2024-12-11 NOTE — TELEPHONE ENCOUNTER
Patient called in stating she has a constant cough and typically Dr. Reddy prescribes her cough medicine this time of year.     Caller requests Refill of:  Cough syrup       Please send to:   [facility]/pharmacy #[id number] [place], [place] - [address] [place] [phone] - F [phone]       Visit / Appointment History:  Future Appointment at [facility]:  1/23/2025   Last Appointment With PCP:  7/25/2024       Caller confirmed instructions and dosages as correct.    Caller was advised that Meds will be refilled as soon as possible, however there can be a 48-72 business hour turn around on refill requests.

## 2025-01-09 RX ORDER — SIMVASTATIN 20 MG
20 TABLET ORAL NIGHTLY
Qty: 90 TABLET | Refills: 3 | Status: SHIPPED | OUTPATIENT
Start: 2025-01-09

## 2025-01-23 ENCOUNTER — OFFICE VISIT (OUTPATIENT)
Age: 89
End: 2025-01-23
Payer: MEDICARE

## 2025-01-23 VITALS
WEIGHT: 165 LBS | HEART RATE: 88 BPM | DIASTOLIC BLOOD PRESSURE: 73 MMHG | RESPIRATION RATE: 18 BRPM | OXYGEN SATURATION: 95 % | SYSTOLIC BLOOD PRESSURE: 123 MMHG | HEIGHT: 66 IN | TEMPERATURE: 97.8 F | BODY MASS INDEX: 26.52 KG/M2

## 2025-01-23 DIAGNOSIS — M81.0 AGE-RELATED OSTEOPOROSIS WITHOUT CURRENT PATHOLOGICAL FRACTURE: ICD-10-CM

## 2025-01-23 DIAGNOSIS — I10 ESSENTIAL (PRIMARY) HYPERTENSION: Primary | ICD-10-CM

## 2025-01-23 DIAGNOSIS — R73.09 ELEVATED HEMOGLOBIN A1C: ICD-10-CM

## 2025-01-23 PROCEDURE — 1090F PRES/ABSN URINE INCON ASSESS: CPT | Performed by: FAMILY MEDICINE

## 2025-01-23 PROCEDURE — 1159F MED LIST DOCD IN RCRD: CPT | Performed by: FAMILY MEDICINE

## 2025-01-23 PROCEDURE — 1123F ACP DISCUSS/DSCN MKR DOCD: CPT | Performed by: FAMILY MEDICINE

## 2025-01-23 PROCEDURE — G8419 CALC BMI OUT NRM PARAM NOF/U: HCPCS | Performed by: FAMILY MEDICINE

## 2025-01-23 PROCEDURE — 99214 OFFICE O/P EST MOD 30 MIN: CPT | Performed by: FAMILY MEDICINE

## 2025-01-23 PROCEDURE — G8427 DOCREV CUR MEDS BY ELIG CLIN: HCPCS | Performed by: FAMILY MEDICINE

## 2025-01-23 PROCEDURE — 4004F PT TOBACCO SCREEN RCVD TLK: CPT | Performed by: FAMILY MEDICINE

## 2025-01-23 RX ORDER — ALENDRONATE SODIUM 35 MG/1
35 TABLET ORAL
Qty: 2 TABLET | Refills: 0 | Status: SHIPPED | OUTPATIENT
Start: 2025-01-23

## 2025-01-23 RX ORDER — LOSARTAN POTASSIUM 25 MG/1
25 TABLET ORAL DAILY
Qty: 90 TABLET | Refills: 3 | Status: SHIPPED | OUTPATIENT
Start: 2025-01-23

## 2025-01-23 RX ORDER — LOSARTAN POTASSIUM 25 MG/1
25 TABLET ORAL DAILY
Qty: 90 TABLET | Refills: 1 | Status: SHIPPED | OUTPATIENT
Start: 2025-01-23 | End: 2025-01-23

## 2025-01-23 SDOH — ECONOMIC STABILITY: FOOD INSECURITY: WITHIN THE PAST 12 MONTHS, THE FOOD YOU BOUGHT JUST DIDN'T LAST AND YOU DIDN'T HAVE MONEY TO GET MORE.: NEVER TRUE

## 2025-01-23 SDOH — ECONOMIC STABILITY: FOOD INSECURITY: WITHIN THE PAST 12 MONTHS, YOU WORRIED THAT YOUR FOOD WOULD RUN OUT BEFORE YOU GOT MONEY TO BUY MORE.: NEVER TRUE

## 2025-01-23 ASSESSMENT — PATIENT HEALTH QUESTIONNAIRE - PHQ9
2. FEELING DOWN, DEPRESSED OR HOPELESS: NOT AT ALL
SUM OF ALL RESPONSES TO PHQ QUESTIONS 1-9: 0
1. LITTLE INTEREST OR PLEASURE IN DOING THINGS: NOT AT ALL
SUM OF ALL RESPONSES TO PHQ QUESTIONS 1-9: 0
SUM OF ALL RESPONSES TO PHQ9 QUESTIONS 1 & 2: 0

## 2025-01-23 NOTE — PROGRESS NOTES
SUBJECTIVE:   Ms. Annemarie Oneal is a 89 y.o. female who is here for follow up of routine medical issues.      HTN: Patient is compliant in taking losartan 50 mg 0.5 tablet. Their BP today was 123/73.    HLD: Pt is compliant in taking  simvastatin 20 mg daily. Their last lipid panel was normal on 07/25/2024.      Pt mentions she had been under a lot of stress for the last 6 month. She states those stressors have dissipated.    Pt mentions she is interested in taking fosamax again. She states when in the ED one doctor told her she may be allergic but pt may have had an reaction to the medication.    Pt mentions she has concerns with her balance.    Pt mentions at nights she does utilize pads.        At this time, she is otherwise doing well and has brought no other complaints to my attention today.  For a list of the medical issues addressed today, see the assessment and plan below.    PMH:   Past Medical History:   Diagnosis Date    Adverse effect of anesthesia 2005    took a long time to go to sleep with last colonoscopy    CAD (coronary artery disease) 2010    MI?    Chronic pain     hip rt    Fuchs' corneal dystrophy     Gastrointestinal disorder     GERD    GERD (gastroesophageal reflux disease)     Glaucoma     High cholesterol     Hypertension     Ill-defined condition     pulled shoulder lt. and elbow replacement left    Ill-defined condition     osteopenia    Osteoporosis     Peripheral neuropathy     BOTH FEET    Shingles 2018     PSH:  has a past surgical history that includes Cataract removal (Bilateral); Colonoscopy; colonoscopy,diagnostic (9/9/2016); Colonoscopy (N/A, 9/9/2016); bladder suspension (10/2021); orthopedic surgery; orthopedic surgery; and Dilation and curettage of uterus (10/2021).    All: is allergic to alendronate, amoxicillin-pot clavulanate, and sulfamethoxazole-trimethoprim.   MEDS:   Current Outpatient Medications   Medication Sig    alendronate (FOSAMAX) 35 MG tablet Take 1 tablet

## 2025-01-24 LAB
ALBUMIN SERPL-MCNC: 3.8 G/DL (ref 3.5–5)
ALBUMIN/GLOB SERPL: 1.3 (ref 1.1–2.2)
ALP SERPL-CCNC: 109 U/L (ref 45–117)
ALT SERPL-CCNC: 24 U/L (ref 12–78)
ANION GAP SERPL CALC-SCNC: 5 MMOL/L (ref 2–12)
AST SERPL-CCNC: 14 U/L (ref 15–37)
BASOPHILS # BLD: 0.05 K/UL (ref 0–0.1)
BASOPHILS NFR BLD: 0.7 % (ref 0–1)
BILIRUB SERPL-MCNC: 1.1 MG/DL (ref 0.2–1)
BUN SERPL-MCNC: 22 MG/DL (ref 6–20)
BUN/CREAT SERPL: 28 (ref 12–20)
CALCIUM SERPL-MCNC: 9.8 MG/DL (ref 8.5–10.1)
CHLORIDE SERPL-SCNC: 111 MMOL/L (ref 97–108)
CO2 SERPL-SCNC: 29 MMOL/L (ref 21–32)
CREAT SERPL-MCNC: 0.78 MG/DL (ref 0.55–1.02)
DIFFERENTIAL METHOD BLD: NORMAL
EOSINOPHIL # BLD: 0.15 K/UL (ref 0–0.4)
EOSINOPHIL NFR BLD: 2.1 % (ref 0–7)
ERYTHROCYTE [DISTWIDTH] IN BLOOD BY AUTOMATED COUNT: 12.6 % (ref 11.5–14.5)
EST. AVERAGE GLUCOSE BLD GHB EST-MCNC: 131 MG/DL
GLOBULIN SER CALC-MCNC: 2.9 G/DL (ref 2–4)
GLUCOSE SERPL-MCNC: 120 MG/DL (ref 65–100)
HBA1C MFR BLD: 6.2 % (ref 4–5.6)
HCT VFR BLD AUTO: 44.9 % (ref 35–47)
HGB BLD-MCNC: 14.4 G/DL (ref 11.5–16)
IMM GRANULOCYTES # BLD AUTO: 0.02 K/UL (ref 0–0.04)
IMM GRANULOCYTES NFR BLD AUTO: 0.3 % (ref 0–0.5)
LYMPHOCYTES # BLD: 2.45 K/UL (ref 0.8–3.5)
LYMPHOCYTES NFR BLD: 34.2 % (ref 12–49)
MCH RBC QN AUTO: 30.1 PG (ref 26–34)
MCHC RBC AUTO-ENTMCNC: 32.1 G/DL (ref 30–36.5)
MCV RBC AUTO: 93.9 FL (ref 80–99)
MONOCYTES # BLD: 0.58 K/UL (ref 0–1)
MONOCYTES NFR BLD: 8.1 % (ref 5–13)
NEUTS SEG # BLD: 3.91 K/UL (ref 1.8–8)
NEUTS SEG NFR BLD: 54.6 % (ref 32–75)
NRBC # BLD: 0 K/UL (ref 0–0.01)
NRBC BLD-RTO: 0 PER 100 WBC
PLATELET # BLD AUTO: 191 K/UL (ref 150–400)
PMV BLD AUTO: 10.7 FL (ref 8.9–12.9)
POTASSIUM SERPL-SCNC: 4.2 MMOL/L (ref 3.5–5.1)
PROT SERPL-MCNC: 6.7 G/DL (ref 6.4–8.2)
RBC # BLD AUTO: 4.78 M/UL (ref 3.8–5.2)
SODIUM SERPL-SCNC: 145 MMOL/L (ref 136–145)
WBC # BLD AUTO: 7.2 K/UL (ref 3.6–11)

## 2025-02-07 ENCOUNTER — TELEPHONE (OUTPATIENT)
Age: 89
End: 2025-02-07

## 2025-02-07 NOTE — TELEPHONE ENCOUNTER
Pt states that fosamax gave her a reaction 25 years ago.     She followed the directions that Dr. Reddy gave her for the fosamax.      Had a reaction to this.  Bloating above waist like pt swallowed a basketball. She then had diarrhea  Tue-Wed 3 am /she got to sleep finally.    Been hydrating and she is fine.      She would like to leave this on her chart that she has a reaction to this medication.      No need to call back.

## 2025-05-05 NOTE — PROGRESS NOTES
Reviewed record in preparation for visit and have obtained necessary documentation. Identified pt with two pt identifiers(name and ). Chief Complaint   Patient presents with    Medication Reaction     Sulfamethoxazol - reactions red bumps lips swollen, discormfort- onset from the last day of medications         Health Maintenance Due   Topic Date Due    DTaP/Tdap/Td  (1 - Tdap) Never done    Shingles Vaccine (1 of 2) Never done    Bone Mineral Density   Never done    Annual Well Visit  2021       Ms. De Leon has a reminder for a \"due or due soon\" health maintenance. I have asked that she discuss this further with her primary care provider for follow-up on this health maintenance. Coordination of Care Questionnaire:  :     1) Have you been to an emergency room, urgent care clinic since your last visit? no   Hospitalized since your last visit? no             2) Have you seen or consulted any other health care providers outside of [address] [place] [address] since your last visit? yes  (Include any pap smears or colon screenings in this section.)    3) In the event something were to happen to you and you were unable to speak on your behalf, do you have an Advance Directive/ Living Will in place stating your wishes?  Yes Bed/Stretcher in lowest position, wheels locked, appropriate side rails in place/Call bell, personal items and telephone in reach/Instruct patient to call for assistance before getting out of bed/chair/stretcher/Non-slip footwear applied when patient is off stretcher/[place] to call system/Physically safe environment - no spills, clutter or unnecessary equipment/Purposeful proactive rounding/Room/bathroom lighting operational, light cord in reach

## 2025-07-24 PROBLEM — Z86.73 HISTORY OF STROKE: Status: ACTIVE | Noted: 2021-10-19

## 2025-08-04 ENCOUNTER — OFFICE VISIT (OUTPATIENT)
Age: 89
End: 2025-08-04
Payer: MEDICARE

## 2025-08-04 VITALS
DIASTOLIC BLOOD PRESSURE: 66 MMHG | HEIGHT: 66 IN | WEIGHT: 160 LBS | HEART RATE: 83 BPM | RESPIRATION RATE: 18 BRPM | OXYGEN SATURATION: 96 % | SYSTOLIC BLOOD PRESSURE: 110 MMHG | BODY MASS INDEX: 25.71 KG/M2 | TEMPERATURE: 97.9 F

## 2025-08-04 DIAGNOSIS — E78.2 MIXED HYPERLIPIDEMIA: ICD-10-CM

## 2025-08-04 DIAGNOSIS — I10 ESSENTIAL (PRIMARY) HYPERTENSION: Primary | ICD-10-CM

## 2025-08-04 DIAGNOSIS — R26.89 BALANCE PROBLEM: ICD-10-CM

## 2025-08-04 DIAGNOSIS — M81.0 AGE-RELATED OSTEOPOROSIS WITHOUT CURRENT PATHOLOGICAL FRACTURE: ICD-10-CM

## 2025-08-04 DIAGNOSIS — R73.09 ELEVATED HEMOGLOBIN A1C: ICD-10-CM

## 2025-08-04 LAB
ALBUMIN SERPL-MCNC: 4.2 G/DL (ref 3.5–5.2)
ALBUMIN/GLOB SERPL: 1.9 (ref 1.1–2.2)
ALP SERPL-CCNC: 93 U/L (ref 35–104)
ALT SERPL-CCNC: 20 U/L (ref 10–50)
ANION GAP SERPL CALC-SCNC: 11 MMOL/L (ref 2–14)
AST SERPL-CCNC: 23 U/L (ref 10–35)
BASOPHILS # BLD: 0.05 K/UL (ref 0–0.1)
BASOPHILS NFR BLD: 0.7 % (ref 0–1)
BILIRUB SERPL-MCNC: 0.5 MG/DL (ref 0–1.2)
BUN SERPL-MCNC: 22 MG/DL (ref 8–23)
BUN/CREAT SERPL: 25 (ref 12–20)
CALCIUM SERPL-MCNC: 10 MG/DL (ref 8.8–10.2)
CHLORIDE SERPL-SCNC: 106 MMOL/L (ref 98–107)
CHOLEST SERPL-MCNC: 156 MG/DL (ref 0–200)
CO2 SERPL-SCNC: 26 MMOL/L (ref 20–29)
CREAT SERPL-MCNC: 0.87 MG/DL (ref 0.6–1)
DIFFERENTIAL METHOD BLD: NORMAL
EOSINOPHIL # BLD: 0.12 K/UL (ref 0–0.4)
EOSINOPHIL NFR BLD: 1.7 % (ref 0–7)
ERYTHROCYTE [DISTWIDTH] IN BLOOD BY AUTOMATED COUNT: 12.8 % (ref 11.5–14.5)
EST. AVERAGE GLUCOSE BLD GHB EST-MCNC: 127 MG/DL
GLOBULIN SER CALC-MCNC: 2.2 G/DL (ref 2–4)
GLUCOSE SERPL-MCNC: 106 MG/DL (ref 65–100)
HBA1C MFR BLD: 6.1 % (ref 4–5.6)
HCT VFR BLD AUTO: 44.4 % (ref 35–47)
HDLC SERPL-MCNC: 47 MG/DL (ref 40–60)
HDLC SERPL: 3.3
HGB BLD-MCNC: 14.3 G/DL (ref 11.5–16)
IMM GRANULOCYTES # BLD AUTO: 0.01 K/UL (ref 0–0.04)
IMM GRANULOCYTES NFR BLD AUTO: 0.1 % (ref 0–0.5)
LDLC SERPL CALC-MCNC: 84 MG/DL
LYMPHOCYTES # BLD: 2.89 K/UL (ref 0.8–3.5)
LYMPHOCYTES NFR BLD: 41.9 % (ref 12–49)
MCH RBC QN AUTO: 30.6 PG (ref 26–34)
MCHC RBC AUTO-ENTMCNC: 32.2 G/DL (ref 30–36.5)
MCV RBC AUTO: 95.1 FL (ref 80–99)
MONOCYTES # BLD: 0.49 K/UL (ref 0–1)
MONOCYTES NFR BLD: 7.1 % (ref 5–13)
NEUTS SEG # BLD: 3.34 K/UL (ref 1.8–8)
NEUTS SEG NFR BLD: 48.5 % (ref 32–75)
NRBC # BLD: 0 K/UL (ref 0–0.01)
NRBC BLD-RTO: 0 PER 100 WBC
PLATELET # BLD AUTO: 170 K/UL (ref 150–400)
PMV BLD AUTO: 11 FL (ref 8.9–12.9)
POTASSIUM SERPL-SCNC: 5.1 MMOL/L (ref 3.5–5.1)
PROT SERPL-MCNC: 6.3 G/DL (ref 6.4–8.3)
RBC # BLD AUTO: 4.67 M/UL (ref 3.8–5.2)
SODIUM SERPL-SCNC: 143 MMOL/L (ref 136–145)
TRIGL SERPL-MCNC: 125 MG/DL (ref 0–150)
VLDLC SERPL CALC-MCNC: 25 MG/DL
WBC # BLD AUTO: 6.9 K/UL (ref 3.6–11)

## 2025-08-04 PROCEDURE — 99214 OFFICE O/P EST MOD 30 MIN: CPT | Performed by: FAMILY MEDICINE

## 2025-08-10 ENCOUNTER — RESULTS FOLLOW-UP (OUTPATIENT)
Age: 89
End: 2025-08-10

## 2025-08-12 ENCOUNTER — HOSPITAL ENCOUNTER (OUTPATIENT)
Facility: HOSPITAL | Age: 89
Setting detail: RECURRING SERIES
Discharge: HOME | End: 2025-08-15
Attending: FAMILY MEDICINE
Payer: MEDICARE

## 2025-08-12 PROCEDURE — 97535 SELF CARE MNGMENT TRAINING: CPT

## 2025-08-12 PROCEDURE — 97110 THERAPEUTIC EXERCISES: CPT

## 2025-08-12 PROCEDURE — 97112 NEUROMUSCULAR REEDUCATION: CPT

## 2025-08-12 PROCEDURE — 97162 PT EVAL MOD COMPLEX 30 MIN: CPT

## (undated) DEVICE — TRAY PREP DRY W/ PREM GLV 2 APPL 6 SPNG 2 UNDPD 1 OVERWRAP

## (undated) DEVICE — TOWEL SURG W17XL27IN STD BLU COT NONFENESTRATED PREWASHED

## (undated) DEVICE — DERMABOND SKIN ADH 0.7ML -- DERMABOND ADVANCED 12/BX

## (undated) DEVICE — KENDALL SCD EXPRESS SLEEVES, KNEE LENGTH, MEDIUM: Brand: KENDALL SCD

## (undated) DEVICE — FLOSEAL MATRIX IS INDICATED IN SURGICAL PROCEDURES (OTHER THAN IN OPHTHALMIC) AS AN ADJUNCT TO HEMOSTASIS WHEN CONTROL OF BLEEDING BY LIGATURE OR CONVENTIONALPROCEDURES IS INEFFECTIVE OR IMPRACTICAL.: Brand: FLOSEAL HEMOSTATIC MATRIX

## (undated) DEVICE — SOLUTION IRRIG 1000ML 0.9% SOD CHL USP POUR PLAS BTL

## (undated) DEVICE — SUTURE STRATAFIX SPRL MCRYL + SZ 2-0 L18IN ABSRB UD CT-1 SXMP1B413

## (undated) DEVICE — TOOL 14MH30 LEGEND 14CM 3MM: Brand: MIDAS REX ™

## (undated) DEVICE — SYR 50ML LR LCK 1ML GRAD NSAF --

## (undated) DEVICE — GLOVE SURG SZ 65 L12IN FNGR THK79MIL GRN LTX FREE

## (undated) DEVICE — GAUZE SPONGES,12 PLY: Brand: CURITY

## (undated) DEVICE — Device

## (undated) DEVICE — TUBING IRRIG L77IN DIA0.241IN L BOR FOR CYSTO W/ NVENT

## (undated) DEVICE — PAD,SANITARY,11 IN,MAXI,N-STRL,IND WRAP: Brand: MEDLINE

## (undated) DEVICE — DRAPE SURG CYSTO N REINF ST W O FLD PCH STD

## (undated) DEVICE — SUTURE VCRL SZ 1 L18IN ABSRB VLT CT-1 L36MM 1/2 CIR J741D

## (undated) DEVICE — KIT JACK TBL PT CARE

## (undated) DEVICE — NEEDLE HYPO 22GA L1.5IN BLK S STL HUB POLYPR SHLD REG BVL

## (undated) DEVICE — GOWN,PREVENTION PLUS,XLN/2XL,ST,22/CS: Brand: MEDLINE

## (undated) DEVICE — BASIN ST MAJOR-NO CAUTERY: Brand: MEDLINE INDUSTRIES, INC.

## (undated) DEVICE — BONE WAX WHITE: Brand: BONE WAX WHITE

## (undated) DEVICE — TRAY CATH 16F DRN BG LTX -- CONVERT TO ITEM 363158

## (undated) DEVICE — SYR 20ML LL STRL LF --

## (undated) DEVICE — STERILE POLYISOPRENE POWDER-FREE SURGICAL GLOVES: Brand: PROTEXIS

## (undated) DEVICE — INFECTION CONTROL KIT SYS

## (undated) DEVICE — Z CONVERTED USE 2107985 COVER FLROSCP W36XL28IN 4 SIDE ADH

## (undated) DEVICE — SOLUTION IRRIG 3000ML 0.9% SOD CHL FLX CONT 0797208] ICU MEDICAL INC]

## (undated) DEVICE — SKIN MARKER,REGULAR TIP WITH RULER AND LABELS: Brand: DEVON

## (undated) DEVICE — 3M™ STERI-DRAPE™ INSTRUMENT POUCH 1018: Brand: STERI-DRAPE™

## (undated) DEVICE — GYN LITHOTOMY: Brand: MEDLINE INDUSTRIES, INC.

## (undated) DEVICE — HOOK RETRCT L5MM SEMI BLNT PRO STAY ELAS

## (undated) DEVICE — NEEDLE SPNL 22GA L3.5IN BLK HUB S STL REG WALL FIT STYL W/

## (undated) DEVICE — NDL SPNE QNCKE 18GX3.5IN LF --

## (undated) DEVICE — BLADE ASSEMB CLP HAIR FINE --

## (undated) DEVICE — ROCKER SWITCH PENCIL BLADE ELECTRODE, HOLSTER: Brand: EDGE

## (undated) DEVICE — SUTURE VCRL SZ 0 L18IN ABSRB VLT L26MM CT-2 1/2 CIR J727D

## (undated) DEVICE — SOLUTION IRRIG 1000ML H2O STRL BLT

## (undated) DEVICE — SOLUTION IRRIGATION H2O 0797305] ICU MEDICAL INC]

## (undated) DEVICE — GLOVE SURG SZ 65 L12IN FNGR THK94MIL STD WHT LTX FREE

## (undated) DEVICE — SOLUTION INJ 1000ML ST H2O FLX CONT

## (undated) DEVICE — REM POLYHESIVE ADULT PATIENT RETURN ELECTRODE: Brand: VALLEYLAB

## (undated) DEVICE — NEEDLE HYPO 18GA L1.5IN PNK S STL HUB POLYPR SHLD REG BVL

## (undated) DEVICE — TOTAL TRAY, DB, 100% SILI FOLEY, 16FR 10: Brand: MEDLINE

## (undated) DEVICE — SUTURE ABSORBABLE BRAIDED 2-0 CT-1 27 IN UD VICRYL J259H

## (undated) DEVICE — MARKER SKIN GENTIAN VLT FN TIP W/ 6IN RUL L RESVR MED GRD

## (undated) DEVICE — HANDLE LT SNAP ON ULT DURABLE LENS FOR TRUMPF ALC DISPOSABLE

## (undated) DEVICE — (D)PREP SKN CHLRAPRP APPL 26ML -- CONVERT TO ITEM 371833

## (undated) DEVICE — BLADE ES L4IN INSUL EDGE

## (undated) DEVICE — SHEET,DRAPE,UNDERBUTTOCK,GRAD POUCH,PORT: Brand: MEDLINE

## (undated) DEVICE — SPONGE GZ W4XL4IN COT RADPQ HIGHLY ABSRB

## (undated) DEVICE — SUTURE VCRL SZ 2-0 L27IN ABSRB UD L26MM SH 1/2 CIR J417H

## (undated) DEVICE — STERILE POLYISOPRENE POWDER-FREE SURGICAL GLOVES WITH EMOLLIENT COATING: Brand: PROTEXIS

## (undated) DEVICE — ASTOUND STANDARD SURGICAL GOWN, XXL: Brand: CONVERTORS

## (undated) DEVICE — COVER,MAYO STAND,STERILE: Brand: MEDLINE

## (undated) DEVICE — DRSG AQUACEL SURG 3.5X6IN -- CONVERT TO ITEM 369227

## (undated) DEVICE — CYSTO/BLADDER IRRIGATION SET, REGULATING CLAMP

## (undated) DEVICE — 3M™ BAIR HUGGER® UNDERBODY BLANKET, SPINAL, 10 PER CASE 57501: Brand: BAIR HUGGER™

## (undated) DEVICE — LAMINECTOMY RICHMOND-LF: Brand: MEDLINE INDUSTRIES, INC.

## (undated) DEVICE — GARMENT,MEDLINE,DVT,INT,CALF,MED, GEN2: Brand: MEDLINE

## (undated) DEVICE — STAPLER SKIN 35CT WD STRL DISP -- MULTIFIRE PREMIUM

## (undated) DEVICE — SUTURE V-LOC 180 SZ 0 L12IN ABSRB GRN L37MM GS-21 1/2 CIR VLOCL0316

## (undated) DEVICE — HOOK RETRCT L5MM E SHRP SELF RET SYS LONE STAR

## (undated) DEVICE — SYR 10ML CTRL LR LCK NSAF LF --

## (undated) DEVICE — AGENT HEMSTAT W2XL14IN OXIDIZED REGENERATED CELOS ABSRB FOR

## (undated) DEVICE — MEDI-VAC NON-CONDUCTIVE SUCTION TUBING: Brand: CARDINAL HEALTH

## (undated) DEVICE — GOWN,SIRUS,NONRNF,SETINSLV,2XL,18/CS: Brand: MEDLINE

## (undated) DEVICE — GLOVE SURG SZ 6 THK91MIL LTX FREE SYN POLYISOPRENE ANTI

## (undated) DEVICE — 1200 GUARD II KIT W/5MM TUBE W/O VAC TUBE: Brand: GUARDIAN

## (undated) DEVICE — SOLUTION IV 1000ML 0.9% SOD CHL

## (undated) DEVICE — SUTURE PDS II SZ 2-0 L27IN ABSRB VLT L26MM CT-2 1/2 CIR Z333H

## (undated) DEVICE — BIPOLAR FORCEPS CORD,BANANA LEADS: Brand: VALLEYLAB

## (undated) DEVICE — DRAPE,LAPAROTOMY,PCH,STERILE: Brand: MEDLINE

## (undated) DEVICE — STRAP RESTRAIN W3.5XL19IN TECLIN STRRP POS LEG DURING LITH